# Patient Record
Sex: FEMALE | Race: BLACK OR AFRICAN AMERICAN | NOT HISPANIC OR LATINO | Employment: FULL TIME | URBAN - METROPOLITAN AREA
[De-identification: names, ages, dates, MRNs, and addresses within clinical notes are randomized per-mention and may not be internally consistent; named-entity substitution may affect disease eponyms.]

---

## 2017-01-09 ENCOUNTER — HOSPITAL ENCOUNTER (OUTPATIENT)
Dept: RADIOLOGY | Facility: HOSPITAL | Age: 47
Discharge: HOME | End: 2017-01-09
Attending: OBSTETRICS & GYNECOLOGY
Payer: COMMERCIAL

## 2017-01-09 ENCOUNTER — TELEPHONE (OUTPATIENT)
Dept: INTERNAL MEDICINE | Facility: CLINIC | Age: 47
End: 2017-01-09

## 2017-01-09 DIAGNOSIS — R92.8 ABNORMAL MAMMOGRAM: ICD-10-CM

## 2017-01-09 DIAGNOSIS — Z11.3 SCREEN FOR STD (SEXUALLY TRANSMITTED DISEASE): Primary | ICD-10-CM

## 2017-01-09 PROCEDURE — 77065 DX MAMMO INCL CAD UNI: CPT | Mod: TC,RT

## 2017-01-09 PROCEDURE — 77065 DX MAMMO INCL CAD UNI: CPT | Mod: 26,RT,, | Performed by: RADIOLOGY

## 2017-01-09 NOTE — TELEPHONE ENCOUNTER
HIV, hepatitis and syphilis ordered. HSV 1 and 2 were positive in the past and should not be retested (it will always be positive).
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
## 2017-01-10 ENCOUNTER — DOCUMENTATION ONLY (OUTPATIENT)
Dept: RADIOLOGY | Facility: HOSPITAL | Age: 47
End: 2017-01-10

## 2017-01-10 NOTE — PROGRESS NOTES
Breast Care Management Follow-Up:    Date of Mammogram:01/09/17    Mammogram Reason:Follow-up at short-interval from prior study    Mammogram Results:Stable calcifications in the right breast.      Referrals/Recommendations:Annual mammo in July 2017.        Patient Status:01/10/17 Results letter and report mailed to pt.

## 2017-01-11 ENCOUNTER — TELEPHONE (OUTPATIENT)
Dept: INTERNAL MEDICINE | Facility: CLINIC | Age: 47
End: 2017-01-11

## 2017-01-11 DIAGNOSIS — I10 ESSENTIAL HYPERTENSION: Primary | ICD-10-CM

## 2017-01-12 ENCOUNTER — TELEPHONE (OUTPATIENT)
Dept: INTERNAL MEDICINE | Facility: CLINIC | Age: 47
End: 2017-01-12

## 2017-01-12 DIAGNOSIS — E87.6 DIURETIC-INDUCED HYPOKALEMIA: Primary | ICD-10-CM

## 2017-01-12 DIAGNOSIS — T50.2X5A DIURETIC-INDUCED HYPOKALEMIA: Primary | ICD-10-CM

## 2017-01-12 NOTE — TELEPHONE ENCOUNTER
Spoke with pt and pt states she is currently out of BP medication (HCTZ) but you advised her to change this medication to something different. Can you call in Rx? Please advise.

## 2017-01-13 ENCOUNTER — PATIENT MESSAGE (OUTPATIENT)
Dept: INTERNAL MEDICINE | Facility: CLINIC | Age: 47
End: 2017-01-13

## 2017-01-13 RX ORDER — POTASSIUM CHLORIDE 750 MG/1
10 TABLET, EXTENDED RELEASE ORAL DAILY
Qty: 30 TABLET | Refills: 6 | Status: SHIPPED | OUTPATIENT
Start: 2017-01-13 | End: 2018-01-10 | Stop reason: DRUGHIGH

## 2017-01-13 NOTE — TELEPHONE ENCOUNTER
Please clarify: is patient agreeable to start a new medication in the place of HCTZ? Or is she willing to take potassium with HCTZ.

## 2017-01-16 ENCOUNTER — TELEPHONE (OUTPATIENT)
Dept: INTERNAL MEDICINE | Facility: CLINIC | Age: 47
End: 2017-01-16

## 2017-01-16 DIAGNOSIS — E87.6 HYPOKALEMIA: Primary | ICD-10-CM

## 2017-01-16 NOTE — TELEPHONE ENCOUNTER
Pt was informed prescription from potassium has been sent to the pharmacy per  recommended recheck K+ in one week pt verbalized understanding.

## 2017-01-16 NOTE — TELEPHONE ENCOUNTER
----- Message from Brandie Schumacher LPN sent at 1/16/2017  9:29 AM CST -----  Lab order needed for K+ recheck in one week

## 2017-01-24 ENCOUNTER — LAB VISIT (OUTPATIENT)
Dept: LAB | Facility: HOSPITAL | Age: 47
End: 2017-01-24
Attending: INTERNAL MEDICINE
Payer: COMMERCIAL

## 2017-01-24 DIAGNOSIS — E87.6 HYPOKALEMIA: ICD-10-CM

## 2017-01-24 LAB
ANION GAP SERPL CALC-SCNC: 6 MMOL/L
BUN SERPL-MCNC: 12 MG/DL
CALCIUM SERPL-MCNC: 9.4 MG/DL
CHLORIDE SERPL-SCNC: 104 MMOL/L
CO2 SERPL-SCNC: 28 MMOL/L
CREAT SERPL-MCNC: 0.9 MG/DL
EST. GFR  (AFRICAN AMERICAN): >60 ML/MIN/1.73 M^2
EST. GFR  (NON AFRICAN AMERICAN): >60 ML/MIN/1.73 M^2
GLUCOSE SERPL-MCNC: 83 MG/DL
POTASSIUM SERPL-SCNC: 3.7 MMOL/L
SODIUM SERPL-SCNC: 138 MMOL/L

## 2017-01-24 PROCEDURE — 80048 BASIC METABOLIC PNL TOTAL CA: CPT

## 2017-01-24 PROCEDURE — 36415 COLL VENOUS BLD VENIPUNCTURE: CPT | Mod: PO

## 2017-02-16 ENCOUNTER — TELEPHONE (OUTPATIENT)
Dept: INTERNAL MEDICINE | Facility: CLINIC | Age: 47
End: 2017-02-16

## 2017-02-16 NOTE — TELEPHONE ENCOUNTER
Pt states she had a allergy test and dermatologist  Dr.Tara Slade recommended prescribed epi-pen pt states she will discuss at upcoming visit on 02/21/17.

## 2017-02-16 NOTE — TELEPHONE ENCOUNTER
----- Message from Varsha Onofre sent at 2/16/2017 12:33 PM CST -----  Contact: pt   States she is calling rg having some questions about allergy tests that were done and can be reached at [phone]//thanks/dbw

## 2017-02-21 ENCOUNTER — OFFICE VISIT (OUTPATIENT)
Dept: INTERNAL MEDICINE | Facility: CLINIC | Age: 47
End: 2017-02-21
Payer: COMMERCIAL

## 2017-02-21 ENCOUNTER — HOSPITAL ENCOUNTER (OUTPATIENT)
Dept: RADIOLOGY | Facility: HOSPITAL | Age: 47
Discharge: HOME | End: 2017-02-21
Attending: INTERNAL MEDICINE
Payer: COMMERCIAL

## 2017-02-21 ENCOUNTER — OFFICE VISIT (OUTPATIENT)
Dept: OPHTHALMOLOGY | Facility: CLINIC | Age: 47
End: 2017-02-21
Payer: COMMERCIAL

## 2017-02-21 VITALS
BODY MASS INDEX: 33.76 KG/M2 | HEIGHT: 60 IN | WEIGHT: 171.94 LBS | HEART RATE: 81 BPM | TEMPERATURE: 98 F | SYSTOLIC BLOOD PRESSURE: 120 MMHG | OXYGEN SATURATION: 99 % | DIASTOLIC BLOOD PRESSURE: 86 MMHG

## 2017-02-21 DIAGNOSIS — Z91.010 PEANUT ALLERGY: ICD-10-CM

## 2017-02-21 DIAGNOSIS — H57.12 PAIN AROUND LEFT EYE: ICD-10-CM

## 2017-02-21 DIAGNOSIS — M79.641 RIGHT HAND PAIN: ICD-10-CM

## 2017-02-21 DIAGNOSIS — R20.2 PARESTHESIA OF RIGHT ARM: ICD-10-CM

## 2017-02-21 DIAGNOSIS — H53.8 BLURRED VISION, LEFT EYE: ICD-10-CM

## 2017-02-21 DIAGNOSIS — S69.91XA HAND TRAUMA, RIGHT, INITIAL ENCOUNTER: ICD-10-CM

## 2017-02-21 DIAGNOSIS — Z91.018 NUT ALLERGY: ICD-10-CM

## 2017-02-21 DIAGNOSIS — S05.12XA: Primary | ICD-10-CM

## 2017-02-21 DIAGNOSIS — H52.7 REFRACTIVE ERROR: ICD-10-CM

## 2017-02-21 DIAGNOSIS — Z13.5 GLAUCOMA SCREENING: ICD-10-CM

## 2017-02-21 DIAGNOSIS — S09.90XA HEAD TRAUMA, INITIAL ENCOUNTER: ICD-10-CM

## 2017-02-21 DIAGNOSIS — S09.90XA HEAD TRAUMA, INITIAL ENCOUNTER: Primary | ICD-10-CM

## 2017-02-21 PROCEDURE — 99999 PR PBB SHADOW E&M-EST. PATIENT-LVL II: CPT | Mod: PBBFAC,,, | Performed by: OPTOMETRIST

## 2017-02-21 PROCEDURE — 3074F SYST BP LT 130 MM HG: CPT | Mod: S$GLB,,, | Performed by: INTERNAL MEDICINE

## 2017-02-21 PROCEDURE — 3078F DIAST BP <80 MM HG: CPT | Mod: S$GLB,,, | Performed by: OPTOMETRIST

## 2017-02-21 PROCEDURE — 3074F SYST BP LT 130 MM HG: CPT | Mod: S$GLB,,, | Performed by: OPTOMETRIST

## 2017-02-21 PROCEDURE — 70450 CT HEAD/BRAIN W/O DYE: CPT | Mod: TC,PO

## 2017-02-21 PROCEDURE — 99214 OFFICE O/P EST MOD 30 MIN: CPT | Mod: S$GLB,,, | Performed by: INTERNAL MEDICINE

## 2017-02-21 PROCEDURE — 70450 CT HEAD/BRAIN W/O DYE: CPT | Mod: 26,,, | Performed by: RADIOLOGY

## 2017-02-21 PROCEDURE — 3079F DIAST BP 80-89 MM HG: CPT | Mod: S$GLB,,, | Performed by: INTERNAL MEDICINE

## 2017-02-21 PROCEDURE — 92014 COMPRE OPH EXAM EST PT 1/>: CPT | Mod: S$GLB,,, | Performed by: OPTOMETRIST

## 2017-02-21 PROCEDURE — 99999 PR PBB SHADOW E&M-EST. PATIENT-LVL IV: CPT | Mod: PBBFAC,,, | Performed by: INTERNAL MEDICINE

## 2017-02-21 PROCEDURE — 73130 X-RAY EXAM OF HAND: CPT | Mod: TC,PO,RT

## 2017-02-21 PROCEDURE — 73130 X-RAY EXAM OF HAND: CPT | Mod: 26,RT,, | Performed by: RADIOLOGY

## 2017-02-21 RX ORDER — EPINEPHRINE 0.3 MG/.3ML
INJECTION SUBCUTANEOUS
Qty: 2 EACH | Refills: 6 | Status: SHIPPED | OUTPATIENT
Start: 2017-02-21 | End: 2018-12-21 | Stop reason: SDUPTHER

## 2017-02-21 RX ORDER — IBUPROFEN 800 MG/1
800 TABLET ORAL EVERY 8 HOURS PRN
Qty: 30 TABLET | Refills: 0 | Status: SHIPPED | OUTPATIENT
Start: 2017-02-21 | End: 2017-08-24

## 2017-02-21 NOTE — LETTER
February 21, 2017      Gladys Fernandez DO  [address] [place] [address]           [facility]hthal[facility]  [address] [place] [address]  Phone: [phone]  Fax: [phone]          Patient: Irasema Vang   MR Number: [id number]   YOB: 1970   Date of Visit: 2/21/2017       Dear Dr. Gladys Fernandez:    Thank you for referring Irasema Vang to me for evaluation. Attached you will find relevant portions of my assessment and plan of care.    If you have questions, please do not hesitate to call me. I look forward to following Irasema Vang along with you.    Sincerely,    MARTA Rodriguez, OD    Enclosure  CC:  No Recipients    If you would like to receive this communication electronically, please contact [email or web address] or [phone] to request more information on [employer] access.    For providers and/or their staff who would like to refer a patient to [facility], please contact us through our one-stop-shop provider referral line, Rodrigo Bailey, at [phone].    If you feel you have received this communication in error or would no longer like to receive these types of communications, please e-mail [email or web address]
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
## 2017-02-21 NOTE — MR AVS SNAPSHOT
[facility]  [address]  [place] [address]  Phone: [phone]  Fax: [phone]                  Irasema Vang   2017 8:00 AM   Office Visit    Description:  Female : 1970   Provider:  Gladys Fernandez DO   Department:  [facility] - Internal Medicine           Reason for Visit     Head Injury     Hand Pain           Diagnoses this Visit        Comments    Peanut allergy    -  Primary     Nut allergy         Head trauma, initial encounter         Pain around left eye         Paresthesia of right arm         Right hand pain         Hand trauma, right, initial encounter         Blurred vision, left eye                To Do List           Future Appointments        Provider Department Dept Phone    2017 9:45 AM MARTA Rodriguez OD [facility] - Ophthalmology [phone]    2017 10:10 AM [facility] CT1 LIMIT 500 LBS [facility] [phone]    2017 8:30 AM LABORATORY, [address] [facility] [phone]    2017 3:00 PM Gladys Fernandez DO [facility] Internal Medicine [phone]      Goals (5 Years of Data)     None       These Medications        Disp Refills Start End    epinephrine (EPIPEN) 0.3 mg/0.3 mL AtIn 2 each 6 2017     Inject into the muscle as needed.    Pharmacy: [id number] [place] [address] Ph #: [phone]       ibuprofen (ADVIL,MOTRIN) 800 MG tablet 30 tablet 0 2017     Take 1 tablet (800 mg total) by mouth every 8 (eight) hours as needed for Pain. - Oral    Pharmacy: [address] [address] [facility] Ph #: [phone]         [facility]     [facility] Nurse Care Line -  Assistance  Registered nurses in the [facility] provide clinical advisement, health education, appointment booking, and other advisory services.  Call for this free service at [phone].             Medications            START taking these NEW medications        Refills    epinephrine (EPIPEN) 0.3 mg/0.3 mL AtIn 6    Sig: Inject into the muscle as needed.    Class: Normal      CHANGE how you are taking these medications     Start Taking Instead of    ibuprofen (ADVIL,MOTRIN) 800 MG tablet ibuprofen (ADVIL,MOTRIN) 800 MG tablet    Dosage:  Take 1 tablet (800 mg total) by mouth every 8 (eight) hours as needed for Pain. Dosage:  Take 1 tablet (800 mg total) by mouth every 6 (six) hours as needed for Pain.    Reason for Change:  Reorder            Verify that the below list of medications is an accurate representation of the medications you are currently taking.  If none reported, the list may be blank. If incorrect, please contact your healthcare provider. Carry this list with you in case of emergency.           Current Medications     hydrochlorothiazide (HYDRODIURIL) 25 MG tablet TAKE ONE TABLET BY MOUTH ONCE DAILY    ibuprofen (ADVIL,MOTRIN) 800 MG tablet Take 1 tablet (800 mg total) by mouth every 8 (eight) hours as needed for Pain.    LINZESS 290 mcg Cap TAKE ONE CAPSULE BY MOUTH ONCE DAILY    potassium chloride SA (K-DUR,KLOR-CON) 10 MEQ tablet Take 1 tablet (10 mEq total) by mouth once daily.    epinephrine (EPIPEN) 0.3 mg/0.3 mL AtIn Inject into the muscle as needed.           Clinical Reference Information           Your Vitals Were     BP Pulse Temp Height    120/86 (BP Location: Right arm, Patient Position: Sitting, BP Method: Manual) 81 97.9 °F (36.6 °C) (Tympanic) 5' (1.524 m)    Weight Last Period SpO2 BMI    78 kg (171 lb 15.3 oz) 01/05/2001 99% 33.58 kg/m2      Blood Pressure          Most Recent Value    BP  120/86      Allergies as of 2/21/2017     Shellfish Containing Products    Codeine    Latex, Natural Rubber    Peanut      Immunizations Administered on Date of Encounter - 2/21/2017     None      Orders Placed During Today's Visit      Normal Orders This Visit    Ambulatory Referral to Ophthalmology      Future Labs/Procedures Expected by Expires    CT Head Without Contrast  2/21/2017 2/21/2018    X-Ray Hand 3 View Right  2/21/2017 2/21/2018      Language Assistance Services     ATTENTION: Language assistance services are available, free of charge. Please call [phone].      ATENCIÓN: Si habla sunday, tiene a banuelos disposición servicios gratuitos de asistencia lingüística. Llame al [phone].     CHÚ Ý: N?u b?n nói Ti?ng Vi?t, có các d?ch v? h? tr? ngôn ng? mi?n phí dành cho b?n. G?i s? [phone].         [facility] complies with applicable Federal civil rights laws and does not discriminate on the basis of race, color, national origin, age, disability, or sex.

## 2017-02-21 NOTE — MR AVS SNAPSHOT
[facility] - Ophthalmology  [address] [facility] [address] [facility] [place] [address]  Phone: [phone]  Fax: [phone]                  Irasema Vang   2017 9:45 AM   Office Visit    Description:  Female : 1970   Provider:  MARTA Rodriguez OD   Department:  [facility] - Ophthalmology           Reason for Visit     Eye Problem           Diagnoses this Visit        Comments    Contusion of eye and ocular adnexa, left, initial encounter    -  Primary     Glaucoma screening         Refractive error                To Do List           Future Appointments        Provider Department Dept Phone    2017 8:30 AM LABORATORY, [address] [facility] [phone]    2017 3:00 PM Gladys Fernandez DO [facility] - Internal Medicine [phone]      Goals (5 Years of Data)     None      Follow-Up and Disposition     Return if symptoms worsen or fail to improve.      [facility]     [facility] Nurse Care Line -  Assistance  Registered nurses in the [facility] provide clinical advisement, health education, appointment booking, and other advisory services.  Call for this free service at [phone].             Medications                Verify that the below list of medications is an accurate representation of the medications you are currently taking.  If none reported, the list may be blank. If incorrect, please contact your healthcare provider. Carry this list with you in case of emergency.           Current Medications     hydrochlorothiazide (HYDRODIURIL) 25 MG tablet TAKE ONE TABLET BY MOUTH ONCE DAILY    ibuprofen (ADVIL,MOTRIN) 800 MG tablet Take 1 tablet (800 mg total) by mouth every 8 (eight) hours as needed for Pain.    LINZESS 290 mcg Cap TAKE ONE CAPSULE BY MOUTH ONCE DAILY    potassium chloride SA (K-DUR,KLOR-CON) 10 MEQ tablet Take 1 tablet (10 mEq total) by mouth once daily.    epinephrine (EPIPEN) 0.3 mg/0.3 mL AtIn Inject into the muscle as needed.            Clinical Reference Information           Your Vitals Were     Last Period                   01/05/2001           Allergies as of 2/21/2017     Shellfish Containing Products    Codeine    Latex, Natural Rubber    Peanut      Immunizations Administered on Date of Encounter - 2/21/2017     None      Language Assistance Services     ATTENTION: Language assistance services are available, free of charge. Please call [phone].      ATENCIÓN: Si habla sunday, tiene a banuelos disposición servicios gratuitos de asistencia lingüística. Llame al [phone].     CHÚ Ý: N?u b?n nói Ti?ng Vi?t, có các d?ch v? h? tr? ngôn ng? mi?n phí dành cho b?n. G?i s? [phone].         [facility] - Ophthal[facility] complies with applicable Federal civil rights laws and does not discriminate on the basis of race, color, national origin, age, disability, or sex.

## 2017-02-22 ENCOUNTER — TELEPHONE (OUTPATIENT)
Dept: INTERNAL MEDICINE | Facility: CLINIC | Age: 47
End: 2017-02-22

## 2017-02-22 DIAGNOSIS — F33.1 MODERATE EPISODE OF RECURRENT MAJOR DEPRESSIVE DISORDER: Primary | ICD-10-CM

## 2017-02-22 DIAGNOSIS — F41.9 ANXIETY: ICD-10-CM

## 2017-02-22 RX ORDER — PAROXETINE HYDROCHLORIDE 20 MG/1
20 TABLET, FILM COATED ORAL EVERY MORNING
Qty: 30 TABLET | Refills: 3 | Status: SHIPPED | OUTPATIENT
Start: 2017-02-22 | End: 2017-05-19 | Stop reason: ALTCHOICE

## 2017-02-22 NOTE — TELEPHONE ENCOUNTER
----- Message from Irasema Romeo sent at 2/22/2017  8:20 AM CST -----  Contact: pt  Please call pt @ [phone] regarding a medication for depression, pt have questions.
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
## 2017-03-10 ENCOUNTER — PATIENT MESSAGE (OUTPATIENT)
Dept: INTERNAL MEDICINE | Facility: CLINIC | Age: 47
End: 2017-03-10

## 2017-04-11 ENCOUNTER — PATIENT MESSAGE (OUTPATIENT)
Dept: INTERNAL MEDICINE | Facility: CLINIC | Age: 47
End: 2017-04-11

## 2017-04-12 ENCOUNTER — LAB VISIT (OUTPATIENT)
Dept: LAB | Facility: HOSPITAL | Age: 47
End: 2017-04-12
Attending: INTERNAL MEDICINE
Payer: COMMERCIAL

## 2017-04-12 DIAGNOSIS — I10 HYPERTENSION GOAL BP (BLOOD PRESSURE) < 140/90: ICD-10-CM

## 2017-04-12 LAB
ANION GAP SERPL CALC-SCNC: 6 MMOL/L
BUN SERPL-MCNC: 12 MG/DL
CALCIUM SERPL-MCNC: 9.1 MG/DL
CHLORIDE SERPL-SCNC: 103 MMOL/L
CHOLEST/HDLC SERPL: 4.3 {RATIO}
CO2 SERPL-SCNC: 28 MMOL/L
CREAT SERPL-MCNC: 0.8 MG/DL
EST. GFR  (AFRICAN AMERICAN): >60 ML/MIN/1.73 M^2
EST. GFR  (NON AFRICAN AMERICAN): >60 ML/MIN/1.73 M^2
GLUCOSE SERPL-MCNC: 86 MG/DL
HDL/CHOLESTEROL RATIO: 23.1 %
HDLC SERPL-MCNC: 212 MG/DL
HDLC SERPL-MCNC: 49 MG/DL
LDLC SERPL CALC-MCNC: 138 MG/DL
NONHDLC SERPL-MCNC: 163 MG/DL
POTASSIUM SERPL-SCNC: 3.6 MMOL/L
SODIUM SERPL-SCNC: 137 MMOL/L
TRIGL SERPL-MCNC: 125 MG/DL

## 2017-04-12 PROCEDURE — 80048 BASIC METABOLIC PNL TOTAL CA: CPT

## 2017-04-12 PROCEDURE — 36415 COLL VENOUS BLD VENIPUNCTURE: CPT | Mod: PO

## 2017-04-12 PROCEDURE — 80061 LIPID PANEL: CPT

## 2017-04-13 RX ORDER — MUPIROCIN 20 MG/G
OINTMENT TOPICAL 2 TIMES DAILY PRN
Qty: 30 G | Refills: 0 | Status: SHIPPED | OUTPATIENT
Start: 2017-04-13 | End: 2018-08-21

## 2017-05-19 ENCOUNTER — INITIAL CONSULT (OUTPATIENT)
Dept: PSYCHIATRY | Facility: CLINIC | Age: 47
End: 2017-05-19
Payer: COMMERCIAL

## 2017-05-19 DIAGNOSIS — G47.00 INSOMNIA, UNSPECIFIED TYPE: ICD-10-CM

## 2017-05-19 DIAGNOSIS — F33.1 MODERATE EPISODE OF RECURRENT MAJOR DEPRESSIVE DISORDER: Primary | ICD-10-CM

## 2017-05-19 PROCEDURE — 90792 PSYCH DIAG EVAL W/MED SRVCS: CPT | Mod: S$GLB,,, | Performed by: PSYCHIATRY & NEUROLOGY

## 2017-05-19 RX ORDER — ESCITALOPRAM OXALATE 10 MG/1
10 TABLET ORAL DAILY
Qty: 30 TABLET | Refills: 1 | Status: SHIPPED | OUTPATIENT
Start: 2017-05-19 | End: 2017-07-24 | Stop reason: ALTCHOICE

## 2017-05-19 RX ORDER — TRAZODONE HYDROCHLORIDE 100 MG/1
TABLET ORAL
Qty: 30 TABLET | Refills: 1 | Status: SHIPPED | OUTPATIENT
Start: 2017-05-19 | End: 2017-07-24 | Stop reason: SDUPTHER
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
## 2017-06-21 ENCOUNTER — PATIENT MESSAGE (OUTPATIENT)
Dept: PSYCHIATRY | Facility: CLINIC | Age: 47
End: 2017-06-21

## 2017-07-24 ENCOUNTER — OFFICE VISIT (OUTPATIENT)
Dept: PSYCHIATRY | Facility: CLINIC | Age: 47
End: 2017-07-24
Payer: COMMERCIAL

## 2017-07-24 DIAGNOSIS — G47.00 INSOMNIA, UNSPECIFIED TYPE: ICD-10-CM

## 2017-07-24 DIAGNOSIS — F33.1 MDD (MAJOR DEPRESSIVE DISORDER), RECURRENT EPISODE, MODERATE: Primary | ICD-10-CM

## 2017-07-24 PROCEDURE — 99213 OFFICE O/P EST LOW 20 MIN: CPT | Mod: S$GLB,,, | Performed by: PSYCHIATRY & NEUROLOGY

## 2017-07-24 RX ORDER — TRAZODONE HYDROCHLORIDE 100 MG/1
TABLET ORAL
Qty: 45 TABLET | Refills: 1 | Status: SHIPPED | OUTPATIENT
Start: 2017-07-24 | End: 2017-10-10 | Stop reason: SDUPTHER

## 2017-07-24 RX ORDER — DULOXETIN HYDROCHLORIDE 30 MG/1
CAPSULE, DELAYED RELEASE ORAL
Qty: 60 CAPSULE | Refills: 1 | Status: SHIPPED | OUTPATIENT
Start: 2017-07-24 | End: 2017-10-10 | Stop reason: ALTCHOICE
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
## 2017-08-04 RX ORDER — HYDROCHLOROTHIAZIDE 25 MG/1
TABLET ORAL
Qty: 30 TABLET | Refills: 6 | Status: SHIPPED | OUTPATIENT
Start: 2017-08-04 | End: 2018-03-01 | Stop reason: SDUPTHER

## 2017-08-14 ENCOUNTER — PATIENT MESSAGE (OUTPATIENT)
Dept: INTERNAL MEDICINE | Facility: CLINIC | Age: 47
End: 2017-08-14

## 2017-08-14 ENCOUNTER — PATIENT MESSAGE (OUTPATIENT)
Dept: PSYCHIATRY | Facility: CLINIC | Age: 47
End: 2017-08-14

## 2017-08-24 ENCOUNTER — OFFICE VISIT (OUTPATIENT)
Dept: OBSTETRICS AND GYNECOLOGY | Facility: CLINIC | Age: 47
End: 2017-08-24
Payer: COMMERCIAL

## 2017-08-24 ENCOUNTER — LAB VISIT (OUTPATIENT)
Dept: LAB | Facility: HOSPITAL | Age: 47
End: 2017-08-24
Attending: OBSTETRICS & GYNECOLOGY
Payer: COMMERCIAL

## 2017-08-24 VITALS
BODY MASS INDEX: 35.4 KG/M2 | DIASTOLIC BLOOD PRESSURE: 72 MMHG | SYSTOLIC BLOOD PRESSURE: 124 MMHG | WEIGHT: 180.31 LBS | HEIGHT: 60 IN

## 2017-08-24 DIAGNOSIS — R10.9 ABDOMINAL PAIN, UNSPECIFIED SITE: ICD-10-CM

## 2017-08-24 DIAGNOSIS — Z11.3 SCREEN FOR STD (SEXUALLY TRANSMITTED DISEASE): ICD-10-CM

## 2017-08-24 DIAGNOSIS — Z01.419 WELL WOMAN EXAM WITH ROUTINE GYNECOLOGICAL EXAM: Primary | ICD-10-CM

## 2017-08-24 PROBLEM — F32.A DEPRESSION: Status: ACTIVE | Noted: 2017-08-24

## 2017-08-24 PROBLEM — C64.9 RENAL CELL CARCINOMA: Status: ACTIVE | Noted: 2017-08-24

## 2017-08-24 PROCEDURE — 86803 HEPATITIS C AB TEST: CPT

## 2017-08-24 PROCEDURE — 99999 PR PBB SHADOW E&M-EST. PATIENT-LVL II: CPT | Mod: PBBFAC,,, | Performed by: OBSTETRICS & GYNECOLOGY

## 2017-08-24 PROCEDURE — 36415 COLL VENOUS BLD VENIPUNCTURE: CPT

## 2017-08-24 PROCEDURE — 86703 HIV-1/HIV-2 1 RESULT ANTBDY: CPT

## 2017-08-24 PROCEDURE — 99396 PREV VISIT EST AGE 40-64: CPT | Mod: S$GLB,,, | Performed by: OBSTETRICS & GYNECOLOGY

## 2017-08-24 PROCEDURE — 87340 HEPATITIS B SURFACE AG IA: CPT

## 2017-08-24 PROCEDURE — 86592 SYPHILIS TEST NON-TREP QUAL: CPT

## 2017-08-24 NOTE — PROGRESS NOTES
HPI:  46 y.o. female patient  presents today for annual exam.  She has had a hysterectomy with both ovaries remaining.  She has had lower pelvic and back pain since her hysterectomy (in ).  She has deep dyspareunia as well since her hysterectomy.  Pain is present daily, but intensity varies.  No other complaints.  Pelvic u/s last year was normal.  Urology workup has been negative.      Past Medical History:   Diagnosis Date    Abnormal Pap smear     history of uterine cancer    Abnormal Pap smear of cervix     Asthma     no meds since age 20    Constipation, chronic     Family history of nephrolithiasis     History of cervical dysplasia 2013    Hypertension     Menopausal syndrome (hot flashes) 2013    Nephrolithiasis 2013    Seizures     1 yr ago- caused by migraine meds--no treatment now    Urinoma     Uterine cancer        Past Surgical History:   Procedure Laterality Date    COLONOSCOPY  13    non-bleeding AVMs    COLONOSCOPY W/ POLYPECTOMY      cystoscopy with stent placement      ESOPHAGOGASTRODUODENOSCOPY  13    HYSTERECTOMY      URETEROSCOPY         REVIEW OF SYSTEMS:  GENERAL:  No fever, chills, fatigue, or weight loss  ABDOMEN:  Normal appetite, no weight loss or abdominal pain  URINARY:  No flank pain, dysuria, or hematuria  REPRODUCTIVE:  No abnormal vaginal bleeding  BREASTS:  No tenderness, masses, or nipple discharge noted of breasts    PHYSICAL EXAM:    APPEARANCE:  Well nourished, well developed, in no acute distress  NECK:  Symmetric without masses or thyromegaly  BREASTS:  Symmetrical, no skin changes or visible lesions.  No palpable masses, nipple discharge, or adenopathy bilaterally.  ABDOMEN:  Soft, no tenderness or masses, no distension noted  PELVIC:  VULVA:  No lesions.  Normal female genitalia  URETHRAL MEATUS:  Normal size and location.  No lesions.  No prolapse  URETHRA:  No masses, tenderness, prolapse, or scarring  VAGINA:  No  lesions or discharge.  No significant cystocele or rectocele, tenderness present at apex of vaginal cuff.  UTERUS:  Surgically absent  ADNEXA:  No masses, tenderness is present bilaterally.  ANUS AND PERINEUM:  No lesions.  No external hemorrhoids    1. Well woman exam with routine gynecological exam     2. Screen for STD (sexually transmitted disease)  HIV-1 and HIV-2 antibodies    Hepatitis B surface antigen    RPR    Hepatitis C antibody   3. Abdominal pain, unspecified site           PLAN:  MMG annually.  Patient counseled regarding treatment options for her longstanding pelvic pain.  She would like to proceed with laparoscopy.  Risks, benefits, alternatives were discussed with her.  Will schedule for diagnostic laparoscopy.  Patient counseled regarding recommended routine health maintenance for her age.

## 2017-08-25 ENCOUNTER — TELEPHONE (OUTPATIENT)
Dept: OBSTETRICS AND GYNECOLOGY | Facility: CLINIC | Age: 47
End: 2017-08-25

## 2017-08-25 DIAGNOSIS — R10.9 ABDOMINAL PAIN, UNSPECIFIED LOCATION: Primary | ICD-10-CM

## 2017-08-25 LAB
HBV SURFACE AG SERPL QL IA: NEGATIVE
HCV AB SERPL QL IA: NEGATIVE
HIV 1+2 AB+HIV1 P24 AG SERPL QL IA: NEGATIVE
RPR SER QL: NORMAL

## 2017-08-25 NOTE — TELEPHONE ENCOUNTER
Spoke with patient and she is contacted her employer to get [employer] paperwork to send to [facility] to complete.  I gave her the fax number to send paperwork

## 2017-08-25 NOTE — TELEPHONE ENCOUNTER
----- Message from Cecily Cook sent at 8/25/2017 12:04 PM CDT -----  Contact: Patient  Patient called to speak with the nurse; she needs more information for her FMLA. She can be contacted at [phone].    Thanks,  Cecily

## 2017-08-31 ENCOUNTER — HOSPITAL ENCOUNTER (OUTPATIENT)
Dept: PREADMISSION TESTING | Facility: HOSPITAL | Age: 47
Discharge: HOME | End: 2017-08-31
Attending: EMERGENCY MEDICINE
Payer: COMMERCIAL

## 2017-08-31 ENCOUNTER — OFFICE VISIT (OUTPATIENT)
Dept: OBSTETRICS AND GYNECOLOGY | Facility: CLINIC | Age: 47
End: 2017-08-31
Payer: COMMERCIAL

## 2017-08-31 VITALS
BODY MASS INDEX: 34.97 KG/M2 | WEIGHT: 178.13 LBS | SYSTOLIC BLOOD PRESSURE: 134 MMHG | HEIGHT: 60 IN | DIASTOLIC BLOOD PRESSURE: 88 MMHG

## 2017-08-31 VITALS — WEIGHT: 178 LBS | HEIGHT: 60 IN | BODY MASS INDEX: 34.95 KG/M2

## 2017-08-31 DIAGNOSIS — Z01.818 PREOP EXAMINATION: Primary | ICD-10-CM

## 2017-08-31 PROCEDURE — 99999 PR PBB SHADOW E&M-EST. PATIENT-LVL III: CPT | Mod: PBBFAC,,, | Performed by: OBSTETRICS & GYNECOLOGY

## 2017-08-31 PROCEDURE — 99499 UNLISTED E&M SERVICE: CPT | Mod: S$GLB,,, | Performed by: OBSTETRICS & GYNECOLOGY

## 2017-08-31 NOTE — PROGRESS NOTES
Patient presents today for preoperative visit.  She is scheduled for diagnostic laparoscopy on 9/5/17.  Procedure was explained to the patient and procedure specific consent form reviewed with the patient and signed by her.  All questions were answered to the patient's satisfaction.  Patient seems to understand the procedure, as well as risks and benefits associated with it.  Will plan to proceed with surgery as planned.  Patient agrees with removal of one or both ovaries if indicated at time of laparoscopy.  She desires to retain ovaries if they are normal.

## 2017-08-31 NOTE — DISCHARGE INSTRUCTIONS
To confirm, Your doctor has instructed you that surgery is scheduled for 9/5/17 at  12:00 pm.       Please report to [facility], [address], 1st floor, main lobby by 10:30 Pre admit office will call afternoon prior to surgery for final arrival time.      INSTRUCTIONS IMPORTANT!!!   Do not eat, drink, or smoke after 12 midnight. May have water or clear liquid juice until 3 hrs prior to surgery. OK to brush teeth, no gum, candy or mints!    ¨ Take only these medicines with a small swallow of water-morning of surgery.  None    Pre operative instructions:  Please review the Pre-Operative Instruction booklet that you were given.        Bathing Instructions--See page 6 in the Pre-operative booklet.      Prevention of surgical site infections:     -Keep incisions clean and dry.   -Do not soak/submerge incisions in water until completely healed.   -Do not apply lotions, powders, creams, or deodorants to site.   -Always make sure hands are cleaned with antibacterial soap/ alcohol-based                 prior to touching the surgical site.  (This includes doctors,                 nurses, staff, and yourself.)    Signs and symptoms:   -Redness and pain around the area where you had surgery   -Drainage of cloudy fluid from your surgical wound   -Fever over 100.4       I have read or had read and explained to me, and understand the above information.  Additional comments or instructions:  Received a copy of Pre-operative instructions booklet, FAQ surgical site infection sheet, and packets of hibiclens (if indicated).

## 2017-09-04 ENCOUNTER — ANESTHESIA EVENT (OUTPATIENT)
Dept: SURGERY | Facility: HOSPITAL | Age: 47
End: 2017-09-04
Payer: COMMERCIAL

## 2017-09-05 ENCOUNTER — ANESTHESIA (OUTPATIENT)
Dept: SURGERY | Facility: HOSPITAL | Age: 47
End: 2017-09-05
Payer: COMMERCIAL

## 2017-09-05 ENCOUNTER — HOSPITAL ENCOUNTER (OUTPATIENT)
Facility: HOSPITAL | Age: 47
Discharge: HOME | End: 2017-09-05
Attending: OBSTETRICS & GYNECOLOGY | Admitting: OBSTETRICS & GYNECOLOGY
Payer: COMMERCIAL

## 2017-09-05 DIAGNOSIS — G89.29 CHRONIC PELVIC PAIN IN FEMALE: Primary | ICD-10-CM

## 2017-09-05 DIAGNOSIS — R10.2 PELVIC PAIN IN FEMALE: ICD-10-CM

## 2017-09-05 DIAGNOSIS — R10.2 CHRONIC PELVIC PAIN IN FEMALE: Primary | ICD-10-CM

## 2017-09-05 DIAGNOSIS — N94.10 DYSPAREUNIA, FEMALE: ICD-10-CM

## 2017-09-05 PROCEDURE — 25000242 PHARM REV CODE 250 ALT 637 W/ HCPCS: Performed by: ANESTHESIOLOGY

## 2017-09-05 PROCEDURE — 37000009 HC ANESTHESIA EA ADD 15 MINS: Performed by: OBSTETRICS & GYNECOLOGY

## 2017-09-05 PROCEDURE — 63600175 PHARM REV CODE 636 W HCPCS: Performed by: ANESTHESIOLOGY

## 2017-09-05 PROCEDURE — 63600175 PHARM REV CODE 636 W HCPCS: Performed by: OBSTETRICS & GYNECOLOGY

## 2017-09-05 PROCEDURE — 71000015 HC POSTOP RECOV 1ST HR: Performed by: OBSTETRICS & GYNECOLOGY

## 2017-09-05 PROCEDURE — 71000039 HC RECOVERY, EACH ADD'L HOUR: Performed by: OBSTETRICS & GYNECOLOGY

## 2017-09-05 PROCEDURE — 88305 TISSUE EXAM BY PATHOLOGIST: CPT | Mod: 26,,, | Performed by: PATHOLOGY

## 2017-09-05 PROCEDURE — 58661 LAPAROSCOPY REMOVE ADNEXA: CPT | Mod: 50,,, | Performed by: OBSTETRICS & GYNECOLOGY

## 2017-09-05 PROCEDURE — 25000003 PHARM REV CODE 250: Performed by: OBSTETRICS & GYNECOLOGY

## 2017-09-05 PROCEDURE — 37000008 HC ANESTHESIA 1ST 15 MINUTES: Performed by: OBSTETRICS & GYNECOLOGY

## 2017-09-05 PROCEDURE — 71000033 HC RECOVERY, INTIAL HOUR: Performed by: OBSTETRICS & GYNECOLOGY

## 2017-09-05 PROCEDURE — 27201423 OPTIME MED/SURG SUP & DEVICES STERILE SUPPLY: Performed by: OBSTETRICS & GYNECOLOGY

## 2017-09-05 PROCEDURE — 36000709 HC OR TIME LEV III EA ADD 15 MIN: Performed by: OBSTETRICS & GYNECOLOGY

## 2017-09-05 PROCEDURE — 63600175 PHARM REV CODE 636 W HCPCS: Performed by: NURSE ANESTHETIST, CERTIFIED REGISTERED

## 2017-09-05 PROCEDURE — C1765 ADHESION BARRIER: HCPCS | Performed by: OBSTETRICS & GYNECOLOGY

## 2017-09-05 PROCEDURE — 25000003 PHARM REV CODE 250: Performed by: NURSE ANESTHETIST, CERTIFIED REGISTERED

## 2017-09-05 PROCEDURE — 36000708 HC OR TIME LEV III 1ST 15 MIN: Performed by: OBSTETRICS & GYNECOLOGY

## 2017-09-05 PROCEDURE — 88305 TISSUE EXAM BY PATHOLOGIST: CPT | Performed by: PATHOLOGY

## 2017-09-05 DEVICE — BARRIER INTERCEED 3X4 ST DIS: Type: IMPLANTABLE DEVICE | Site: ABDOMEN | Status: FUNCTIONAL

## 2017-09-05 RX ORDER — IBUPROFEN 800 MG/1
800 TABLET ORAL EVERY 6 HOURS PRN
Qty: 30 TABLET | Refills: 1 | Status: SHIPPED | OUTPATIENT
Start: 2017-09-05

## 2017-09-05 RX ORDER — KETOROLAC TROMETHAMINE 30 MG/ML
INJECTION, SOLUTION INTRAMUSCULAR; INTRAVENOUS
Status: DISCONTINUED | OUTPATIENT
Start: 2017-09-05 | End: 2017-09-05

## 2017-09-05 RX ORDER — LIDOCAINE HCL/PF 100 MG/5ML
SYRINGE (ML) INTRAVENOUS
Status: DISCONTINUED | OUTPATIENT
Start: 2017-09-05 | End: 2017-09-05

## 2017-09-05 RX ORDER — NEOSTIGMINE METHYLSULFATE 1 MG/ML
INJECTION, SOLUTION INTRAVENOUS
Status: DISCONTINUED | OUTPATIENT
Start: 2017-09-05 | End: 2017-09-05

## 2017-09-05 RX ORDER — LIDOCAINE HYDROCHLORIDE 10 MG/ML
1 INJECTION, SOLUTION EPIDURAL; INFILTRATION; INTRACAUDAL; PERINEURAL ONCE
Status: DISCONTINUED | OUTPATIENT
Start: 2017-09-05 | End: 2017-09-05 | Stop reason: HOSPADM

## 2017-09-05 RX ORDER — OXYCODONE AND ACETAMINOPHEN 5; 325 MG/1; MG/1
1 TABLET ORAL EVERY 4 HOURS PRN
Qty: 20 TABLET | Refills: 0 | Status: SHIPPED | OUTPATIENT
Start: 2017-09-05 | End: 2018-02-19

## 2017-09-05 RX ORDER — PHENAZOPYRIDINE HYDROCHLORIDE 100 MG/1
200 TABLET, FILM COATED ORAL
Status: DISCONTINUED | OUTPATIENT
Start: 2017-09-05 | End: 2017-09-05 | Stop reason: HOSPADM

## 2017-09-05 RX ORDER — SUCCINYLCHOLINE CHLORIDE 20 MG/ML
INJECTION INTRAMUSCULAR; INTRAVENOUS
Status: DISCONTINUED | OUTPATIENT
Start: 2017-09-05 | End: 2017-09-05

## 2017-09-05 RX ORDER — ROCURONIUM BROMIDE 10 MG/ML
INJECTION, SOLUTION INTRAVENOUS
Status: DISCONTINUED | OUTPATIENT
Start: 2017-09-05 | End: 2017-09-05

## 2017-09-05 RX ORDER — PROPOFOL 10 MG/ML
VIAL (ML) INTRAVENOUS
Status: DISCONTINUED | OUTPATIENT
Start: 2017-09-05 | End: 2017-09-05

## 2017-09-05 RX ORDER — MIDAZOLAM HYDROCHLORIDE 1 MG/ML
INJECTION, SOLUTION INTRAMUSCULAR; INTRAVENOUS
Status: DISCONTINUED | OUTPATIENT
Start: 2017-09-05 | End: 2017-09-05

## 2017-09-05 RX ORDER — ACETAMINOPHEN 10 MG/ML
1000 INJECTION, SOLUTION INTRAVENOUS
Status: COMPLETED | OUTPATIENT
Start: 2017-09-05 | End: 2017-09-05

## 2017-09-05 RX ORDER — SODIUM CHLORIDE, SODIUM LACTATE, POTASSIUM CHLORIDE, CALCIUM CHLORIDE 600; 310; 30; 20 MG/100ML; MG/100ML; MG/100ML; MG/100ML
INJECTION, SOLUTION INTRAVENOUS CONTINUOUS
Status: DISCONTINUED | OUTPATIENT
Start: 2017-09-05 | End: 2017-09-05 | Stop reason: HOSPADM

## 2017-09-05 RX ORDER — GLYCOPYRROLATE 0.2 MG/ML
INJECTION INTRAMUSCULAR; INTRAVENOUS
Status: DISCONTINUED | OUTPATIENT
Start: 2017-09-05 | End: 2017-09-05

## 2017-09-05 RX ORDER — FENTANYL CITRATE 50 UG/ML
25 INJECTION, SOLUTION INTRAMUSCULAR; INTRAVENOUS EVERY 5 MIN PRN
Status: DISCONTINUED | OUTPATIENT
Start: 2017-09-05 | End: 2017-09-05 | Stop reason: HOSPADM

## 2017-09-05 RX ORDER — FENTANYL CITRATE 50 UG/ML
INJECTION, SOLUTION INTRAMUSCULAR; INTRAVENOUS
Status: DISCONTINUED | OUTPATIENT
Start: 2017-09-05 | End: 2017-09-05

## 2017-09-05 RX ORDER — MEPERIDINE HYDROCHLORIDE 50 MG/ML
12.5 INJECTION INTRAMUSCULAR; INTRAVENOUS; SUBCUTANEOUS ONCE AS NEEDED
Status: DISCONTINUED | OUTPATIENT
Start: 2017-09-05 | End: 2017-09-05 | Stop reason: HOSPADM

## 2017-09-05 RX ORDER — SODIUM CHLORIDE 0.9 % (FLUSH) 0.9 %
3 SYRINGE (ML) INJECTION
Status: DISCONTINUED | OUTPATIENT
Start: 2017-09-05 | End: 2017-09-05 | Stop reason: HOSPADM

## 2017-09-05 RX ORDER — HYDROMORPHONE HYDROCHLORIDE 2 MG/ML
0.2 INJECTION, SOLUTION INTRAMUSCULAR; INTRAVENOUS; SUBCUTANEOUS EVERY 5 MIN PRN
Status: DISCONTINUED | OUTPATIENT
Start: 2017-09-05 | End: 2017-09-05 | Stop reason: HOSPADM

## 2017-09-05 RX ORDER — IPRATROPIUM BROMIDE AND ALBUTEROL SULFATE 2.5; .5 MG/3ML; MG/3ML
3 SOLUTION RESPIRATORY (INHALATION) ONCE
Status: COMPLETED | OUTPATIENT
Start: 2017-09-05 | End: 2017-09-05

## 2017-09-05 RX ORDER — ONDANSETRON 2 MG/ML
INJECTION INTRAMUSCULAR; INTRAVENOUS
Status: DISCONTINUED | OUTPATIENT
Start: 2017-09-05 | End: 2017-09-05

## 2017-09-05 RX ADMIN — HYDROMORPHONE HYDROCHLORIDE 0.2 MG: 2 INJECTION, SOLUTION INTRAMUSCULAR; INTRAVENOUS; SUBCUTANEOUS at 03:09

## 2017-09-05 RX ADMIN — SUCCINYLCHOLINE CHLORIDE 140 MG: 20 INJECTION, SOLUTION INTRAMUSCULAR; INTRAVENOUS at 01:09

## 2017-09-05 RX ADMIN — ROBINUL 0.4 MG: 0.2 INJECTION INTRAMUSCULAR; INTRAVENOUS at 02:09

## 2017-09-05 RX ADMIN — NEOSTIGMINE METHYLSULFATE 3 MG: 1 INJECTION INTRAVENOUS at 02:09

## 2017-09-05 RX ADMIN — ONDANSETRON 4 MG: 2 INJECTION, SOLUTION INTRAMUSCULAR; INTRAVENOUS at 02:09

## 2017-09-05 RX ADMIN — FENTANYL CITRATE 100 MCG: 50 INJECTION, SOLUTION INTRAMUSCULAR; INTRAVENOUS at 01:09

## 2017-09-05 RX ADMIN — MIDAZOLAM HYDROCHLORIDE 2 MG: 1 INJECTION, SOLUTION INTRAMUSCULAR; INTRAVENOUS at 01:09

## 2017-09-05 RX ADMIN — ACETAMINOPHEN 1000 MG: 10 INJECTION, SOLUTION INTRAVENOUS at 01:09

## 2017-09-05 RX ADMIN — FENTANYL CITRATE 50 MCG: 50 INJECTION, SOLUTION INTRAMUSCULAR; INTRAVENOUS at 02:09

## 2017-09-05 RX ADMIN — FENTANYL CITRATE 50 MCG: 50 INJECTION, SOLUTION INTRAMUSCULAR; INTRAVENOUS at 01:09

## 2017-09-05 RX ADMIN — ROCURONIUM BROMIDE 5 MG: 10 INJECTION, SOLUTION INTRAVENOUS at 01:09

## 2017-09-05 RX ADMIN — SODIUM CHLORIDE, SODIUM LACTATE, POTASSIUM CHLORIDE, AND CALCIUM CHLORIDE: 600; 310; 30; 20 INJECTION, SOLUTION INTRAVENOUS at 01:09

## 2017-09-05 RX ADMIN — ROCURONIUM BROMIDE 15 MG: 10 INJECTION, SOLUTION INTRAVENOUS at 01:09

## 2017-09-05 RX ADMIN — KETOROLAC TROMETHAMINE 30 MG: 30 INJECTION, SOLUTION INTRAMUSCULAR; INTRAVENOUS at 02:09

## 2017-09-05 RX ADMIN — SODIUM CHLORIDE, SODIUM LACTATE, POTASSIUM CHLORIDE, AND CALCIUM CHLORIDE: 600; 310; 30; 20 INJECTION, SOLUTION INTRAVENOUS at 02:09

## 2017-09-05 RX ADMIN — IPRATROPIUM BROMIDE AND ALBUTEROL SULFATE 3 ML: .5; 3 SOLUTION RESPIRATORY (INHALATION) at 02:09

## 2017-09-05 RX ADMIN — PROPOFOL 200 MG: 10 INJECTION, EMULSION INTRAVENOUS at 01:09

## 2017-09-05 RX ADMIN — LIDOCAINE HYDROCHLORIDE 75 MG: 20 INJECTION, SOLUTION INTRAVENOUS at 01:09

## 2017-09-05 NOTE — PLAN OF CARE
Pt resting on stretcher, no s&s of pain/distress. Abd sites with edges approximated. VSS. See flow sheet for detailed assessment. Will cont to monitor.

## 2017-09-05 NOTE — OP NOTE
OPERATIVE NOTE      DATE OF PROCEDURE:  9/5/2017    PREOPERATIVE DIAGNOSIS:    1.  Chronic Pelvic Pain  2.  Dyspareunia  3.  Prior hysterectomy    POSTOPERATIVE DIAGNOSIS    1.  Chronic Pelvic Pain  2.  Dyspareunia  3.  Prior hysterectomy  4.  Adhesions of right ovary and tube to vaginal cuff and adhesions of left tube to vaginal cuff.    OPERATIVE PROCEDURE:    1.  Laparoscopic right salpingo-oophorectomy  2.  Left salpingectomy  3.  Lysis of adhesions    SURGEON:  Darby Monroy MD    ASSISTANT:  Patrica Rudd CST    ANESTHESIA:  General    ESTIMATED BLOOD LOSS:  75 ml    FINDINGS:  Adhesions of right ovary and tube to vaginal cuff and right pelvic sidewall.  Adhesions of left tube to vaginal cuff.  Bowel adhesions to vaginal cuff.  Normal left ovary    COMPLICATIONS:  None    PROCEDURE IN DETAIL:   The procedure was explained to the patient and informed consent was obtained. The patient was brought to the operating room where general anesthesia was administered. A garcia catheter was placed, as well as a Zumi uterine manipulator, and she was prepped and draped in the usual sterile manner. A time out was performed. While tenting up on the abdominal skin with towel clips, a Veress needle was placed through the umbilicus into the peritoneal cavity. A saline drop test was noted to be normal. The abdomen was insufflated with carbon dioxide to reach a pressure of 15 mm of Hg. A small intraumbilical stab incision was then made with the scalpel and a 5 mm trocar and sleeve were placed without difficulty. Laparoscopic confirmation of location was achieved. The upper abdomen was visualized and appeared normal. The pelvis was visualized with the findings noted above. A right 5 mm trocar as well as a suprapubic 11 mm trocar were also placed under direct visualization.      Adhesions of bowel to the vaginal cuff were removed using the harmonic scalpel.  The left fallopian tube was noted to be adhered to the vaginal cuff with  adhesions.  The tube was removed using the harmonic scalpel.  The right ovary and tube were placed under tension with a grasper.  The harmonic scalpel was used to transect the infundibulopelvic ligament.  The harmonic scalpel was then used to completely free the ovary and tube from adhesions to the pelvic sidewall and to the vaginal cuff.  An endocatch bag was then used to remove the ovary and tubes through the suprapubic incision.  These were sent to pathology.  The pelvis was well irrigated with good hemostasis noted.  No other pelvic abnormalities were noted.  The appendix appeared normal.     At this time the procedure was concluded, as no other abnormalities were noted in the pelvis. All ports were removed under direct visualization and carbon dioxide was allowed to escape from the abdomen.  The fascia of the suprapubic incision was closed with a 0-Vicryl interrupted suture.  The skin incisions were closed with 4-0 vicryl in a subcuticular fashion. Dermoplast skin adhesive was placed over the incisions. The patient tolerated the procedure well and was taken to the recovery room in stable condition. All counts were correct x 3.

## 2017-09-05 NOTE — ANESTHESIA PREPROCEDURE EVALUATION
09/05/2017  Irasema Vang is a 46 y.o., female.    Anesthesia Evaluation    I have reviewed the Patient Summary Reports.    I have reviewed the Nursing Notes.   I have reviewed the Medications.     Review of Systems  Anesthesia Hx:  Denies Family Hx of Anesthesia complications.   Denies Personal Hx of Anesthesia complications.   Social:  Non-Smoker    Hematology/Oncology:  Hematology Normal   Oncology Normal     Cardiovascular:   Hypertension    Pulmonary:   Asthma asymptomatic    Renal/:   renal calculi    Hepatic/GI:  Hepatic/GI Normal    Neurological:   Seizures    Endocrine:  Endocrine Normal    Psych:   depression          Physical Exam   Airway/Jaw/Neck:  Airway Findings: Mouth Opening: Normal Tongue: Normal  General Airway Assessment: Adult  Mallampati: II  Jaw/Neck Findings:  Neck ROM: Normal ROM       Chest/Lungs:  Chest/Lungs Findings: Clear to auscultation, Normal Respiratory Rate     Heart/Vascular:  Heart Findings: Rate: Normal  Rhythm: Regular Rhythm             Anesthesia Plan  Type of Anesthesia, risks & benefits discussed:  Anesthesia Type:  general  Patient's Preference:   Intra-op Monitoring Plan:   Intra-op Monitoring Plan Comments:   Post Op Pain Control Plan:   Post Op Pain Control Plan Comments:   Induction:   IV  Beta Blocker:  Patient is not currently on a Beta-Blocker (No further documentation required).       Informed Consent: Patient understands risks and agrees with Anesthesia plan.  Questions answered. Anesthesia consent signed with patient.  ASA Score: 2     Day of Surgery Review of History & Physical:    H&P update referred to the surgeon.         Ready For Surgery From Anesthesia Perspective.

## 2017-09-05 NOTE — HPI
46 y.o. female patient  presents for diagnostic laparoscopy.  She has had a hysterectomy with both ovaries remaining.  She has had lower pelvic and back pain since her hysterectomy (in ).  She has deep dyspareunia as well since her hysterectomy.  Pain is present daily, but intensity varies.  No other complaints.  Pelvic u/s last year was normal.  Urology workup has been negative

## 2017-09-05 NOTE — H&P
[facility] -   Obstetrics & Gynecology  History & Physical    Patient Name: Irasema Vang  MRN: [id number]  Admission Date: (Not on file)  Primary Care Provider: Gladys Fernandez DO    Subjective:     Chief Complaint/Reason for Admission:  Diagnostic laparoscopy for chronic pelvic pain and dyspareunia    History of Present Illness:  46 y.o. female patient  presents for diagnostic laparoscopy.  She has had a hysterectomy with both ovaries remaining.  She has had lower pelvic and back pain since her hysterectomy (in ).  She has deep dyspareunia as well since her hysterectomy.  Pain is present daily, but intensity varies.  No other complaints.  Pelvic u/s last year was normal.  Urology workup has been negative        Obstetric History       T0      L0     SAB0   TAB0   Ectopic0   Multiple0   Live Births0       # Outcome Date GA Lbr Mauricio/2nd Weight Sex Delivery Anes PTL Lv   3 SAB            2 SAB            1 SAB                 Past Medical History:   Diagnosis Date    Abnormal Pap smear     history of uterine cancer    Abnormal Pap smear of cervix     Asthma     no meds since age 20    Constipation, chronic     Depression     Family history of nephrolithiasis     History of cervical dysplasia 2013    Hypertension     Menopausal syndrome (hot flashes) 2013    Nephrolithiasis 2013    Seizures     1 yr ago- caused by migraine meds--no treatment now    Urinoma     Uterine cancer      Past Surgical History:   Procedure Laterality Date    COLONOSCOPY  13    non-bleeding AVMs    COLONOSCOPY W/ POLYPECTOMY      cystoscopy with stent placement      ESOPHAGOGASTRODUODENOSCOPY  13    HYSTERECTOMY      URETEROSCOPY         No prescriptions prior to admission.       Review of patient's allergies indicates:   Allergen Reactions    Shellfish containing products     Codeine Itching    Latex, natural rubber Hives    Peanut         Family  History     Problem Relation (Age of Onset)    Breast cancer Maternal Aunt, Maternal Aunt    Cancer Maternal Grandmother, Maternal Aunt, Father    Diabetes Brother    Hypertension Maternal Aunt, Mother        Social History Main Topics    Smoking status: Never Smoker    Smokeless tobacco: Never Used    Alcohol use No    Drug use: No    Sexual activity: Not Currently     Partners: Male     Review of Systems   Constitutional: Negative for chills and fever.   Respiratory: Negative for cough and shortness of breath.    Cardiovascular: Negative for chest pain.   Gastrointestinal: Negative for abdominal pain, nausea and vomiting.   Genitourinary: Positive for dyspareunia and pelvic pain. Negative for dysuria and vaginal bleeding.      Objective:     Vital Signs (Most Recent):    Vital Signs (24h Range):           There is no height or weight on file to calculate BMI.    Patient's last menstrual period was 01/05/2001.    Physical Exam:   Constitutional: She is oriented to person, place, and time. She appears well-developed and well-nourished. No distress.    HENT:   Head: Normocephalic and atraumatic.     Neck: Neck supple.    Cardiovascular: Normal rate and regular rhythm.     Pulmonary/Chest: Effort normal.        Abdominal: Soft. She exhibits no distension. There is no tenderness.             Musculoskeletal: She exhibits no edema or tenderness.       Neurological: She is alert and oriented to person, place, and time.    Skin: Skin is warm and dry.    Psychiatric: She has a normal mood and affect.       Laboratory:  I have personallly reviewed all pertinent lab results from the last 24 hours.        Assessment/Plan:     * Chronic pelvic pain in female    Proceed with diagnostic laparoscopy, as planned.  Plan to retain ovaries unless an abnormality is noted.            Darby Monroy MD  Obstetrics & Gynecology  [facility]

## 2017-09-05 NOTE — ASSESSMENT & PLAN NOTE
Proceed with diagnostic laparoscopy, as planned.  Plan to retain ovaries unless an abnormality is noted.

## 2017-09-05 NOTE — TRANSFER OF CARE
Anesthesia Transfer of Care Note    Patient: Irasema Vang    Procedure(s) Performed: Procedure(s) (LRB):  LAPAROSCOPY-DIAGNOSTIC (N/A)  SALPINGECTOMY-LAPAROSCOPIC (Bilateral)  OOPHORECTOMY-LAPAROSCOPIC (Right)    Patient location: PACU    Anesthesia Type: general    Transport from OR: Transported from OR on room air with adequate spontaneous ventilation    Post pain: adequate analgesia    Post assessment: no apparent anesthetic complications and tolerated procedure well    Post vital signs: stable    Level of consciousness: awake    Nausea/Vomiting: no nausea/vomiting    Complications: none    Transfer of care protocol was followed      Last vitals:   Visit Vitals  /76 (BP Location: Right arm, Patient Position: Sitting)   Pulse (!) 53   Temp 36.5 °C (97.7 °F) (Tympanic)   Resp 18   Ht 5' (1.524 m)   Wt 79.5 kg (175 lb 4.3 oz)   LMP 01/05/2001   SpO2 100%   Breastfeeding? No   BMI 34.23 kg/m²

## 2017-09-05 NOTE — SUBJECTIVE & OBJECTIVE
Obstetric History       T0      L0     SAB0   TAB0   Ectopic0   Multiple0   Live Births0       # Outcome Date GA Lbr Mauricio/2nd Weight Sex Delivery Anes PTL Lv   3 SAB            2 SAB            1 SAB                 Past Medical History:   Diagnosis Date    Abnormal Pap smear     history of uterine cancer    Abnormal Pap smear of cervix     Asthma     no meds since age 20    Constipation, chronic     Depression     Family history of nephrolithiasis     History of cervical dysplasia 2013    Hypertension     Menopausal syndrome (hot flashes) 2013    Nephrolithiasis 2013    Seizures     1 yr ago- caused by migraine meds--no treatment now    Urinoma     Uterine cancer      Past Surgical History:   Procedure Laterality Date    COLONOSCOPY  13    non-bleeding AVMs    COLONOSCOPY W/ POLYPECTOMY      cystoscopy with stent placement      ESOPHAGOGASTRODUODENOSCOPY  13    HYSTERECTOMY      URETEROSCOPY         No prescriptions prior to admission.       Review of patient's allergies indicates:   Allergen Reactions    Shellfish containing products     Codeine Itching    Latex, natural rubber Hives    Peanut         Family History     Problem Relation (Age of Onset)    Breast cancer Maternal Aunt, Maternal Aunt    Cancer Maternal Grandmother, Maternal Aunt, Father    Diabetes Brother    Hypertension Maternal Aunt, Mother        Social History Main Topics    Smoking status: Never Smoker    Smokeless tobacco: Never Used    Alcohol use No    Drug use: No    Sexual activity: Not Currently     Partners: Male     Review of Systems   Constitutional: Negative for chills and fever.   Respiratory: Negative for cough and shortness of breath.    Cardiovascular: Negative for chest pain.   Gastrointestinal: Negative for abdominal pain, nausea and vomiting.   Genitourinary: Positive for dyspareunia and pelvic pain. Negative for dysuria and vaginal bleeding.       Objective:     Vital Signs (Most Recent):    Vital Signs (24h Range):           There is no height or weight on file to calculate BMI.    Patient's last menstrual period was 01/05/2001.    Physical Exam:   Constitutional: She is oriented to person, place, and time. She appears well-developed and well-nourished. No distress.    HENT:   Head: Normocephalic and atraumatic.     Neck: Neck supple.    Cardiovascular: Normal rate and regular rhythm.     Pulmonary/Chest: Effort normal.        Abdominal: Soft. She exhibits no distension. There is no tenderness.             Musculoskeletal: She exhibits no edema or tenderness.       Neurological: She is alert and oriented to person, place, and time.    Skin: Skin is warm and dry.    Psychiatric: She has a normal mood and affect.       Laboratory:  I have personallly reviewed all pertinent lab results from the last 24 hours.

## 2017-09-05 NOTE — DISCHARGE INSTRUCTIONS
General Information:    1.  Do not drink alcoholic beverages including beer for 24 hours or as long as you are on pain medication..  2.  Do not drive a motor vehicle, operate machinery or power tools, or signs legal papers for 24 hours or as long as you are on pain medication.   3.  You may experience light-headedness, dizziness, and sleepiness following surgery. Please do not stay alone. A responsible adult should be with you for this 24 hour period.  4.  Go home and rest.    5. Progress slowly to a normal diet unless instructed.  Otherwise, begin with liquids such as soft drinks, then soup and crackers working up to solid foods. Drink plenty of nonalcoholic fluids.  6.  Certain anesthetics and pain medications produce nausea and vomiting in certain       individuals. If nausea becomes a problem at home, call you doctor.    7. Several times every hour while you are awake, take 2-3 deep breaths and cough. If you had stomach surgery hold a pillow or rolled towel firmly against your stomach before you cough. This will help with any pain the cough might cause.    8. Several times every hour while you are awake, pump and flex your feet 5-6 times and do foot circles. This will help prevent blood clots.    9.Call your doctor for severe pain, bleeding, fever, or signs or symptoms of infection (pain, swelling, redness, foul odor, drainage).    10.You can contact your doctor anytime by [phone] for the [facility] (at [facility]) or [phone] for the [facility] on [facility].   [email or web address] is another way to contact your doctor if you are an active participant online with [facility].

## 2017-09-06 ENCOUNTER — TELEPHONE (OUTPATIENT)
Dept: OBSTETRICS AND GYNECOLOGY | Facility: CLINIC | Age: 47
End: 2017-09-06

## 2017-09-06 RX ORDER — LINACLOTIDE 290 UG/1
CAPSULE, GELATIN COATED ORAL
Qty: 30 CAPSULE | Refills: 11 | Status: SHIPPED | OUTPATIENT
Start: 2017-09-06 | End: 2018-09-18 | Stop reason: ALTCHOICE

## 2017-09-06 NOTE — TELEPHONE ENCOUNTER
"----- Message from Taricorine Garcia sent at 9/6/2017  8:47 AM CDT -----  Contact: Patient  Patient states she was to "out of it" after her surgery, and needs to talk to nurse about what took place, please call her back at [phone]. Thank you  "

## 2017-09-12 VITALS
BODY MASS INDEX: 34.41 KG/M2 | OXYGEN SATURATION: 97 % | SYSTOLIC BLOOD PRESSURE: 142 MMHG | DIASTOLIC BLOOD PRESSURE: 79 MMHG | HEIGHT: 60 IN | RESPIRATION RATE: 14 BRPM | WEIGHT: 175.25 LBS | HEART RATE: 68 BPM | TEMPERATURE: 98 F

## 2017-09-12 NOTE — DISCHARGE SUMMARY
[facility] -   Obstetrics & Gynecology  Discharge Summary    Patient Name: Irasema Vang  MRN: [id number]  Admission Date: 2017  Hospital Length of Stay: 0 days  Discharge Date and Time: 2017  4:43 PM  Attending Physician: No att. providers found   Discharging Provider: Darby Monroy MD  Primary Care Provider: Gladys Fernandez DO    HPI:  46 y.o. female patient  presents for diagnostic laparoscopy.  She has had a hysterectomy with both ovaries remaining.  She has had lower pelvic and back pain since her hysterectomy (in ).  She has deep dyspareunia as well since her hysterectomy.  Pain is present daily, but intensity varies.  No other complaints.  Pelvic u/s last year was normal.  Urology workup has been negative    Hospital Course:  No notes on file    Procedure(s) (LRB):  LAPAROSCOPY-DIAGNOSTIC (N/A)  SALPINGECTOMY-LAPAROSCOPIC (Left)  [id number] (Right)  [id number]         Significant Diagnostic Studies: Labs: All labs within the past 24 hours have been reviewed    Pending Diagnostic Studies:     None        Final Active Diagnoses:    Diagnosis Date Noted POA    PRINCIPAL PROBLEM:  Pelvic pain in female [R10.2] 2017 Yes    Chronic pelvic pain in female [R10.2, G89.29] 2017 Yes    Dyspareunia, female [N94.10] 2017 Yes      Problems Resolved During this Admission:    Diagnosis Date Noted Date Resolved POA        Discharged Condition: stable    Disposition: Home or Self Care    Follow Up:  Follow-up Information     Darby Monroy MD In 4 weeks.    Specialties:  Obstetrics, Obstetrics and Gynecology  Why:  Postop check  Contact information:  [address] [place] [address] [address] Aura [address]OCHO [address] [phone]                 Patient Instructions:     Diet general     Activity as tolerated     Other restrictions (specify):   Order Comments: Pelvic rest x 2 weeks     Call MD for:  temperature >100.4     Call MD for:   persistent nausea and vomiting or diarrhea     Call MD for:  severe uncontrolled pain     Call MD for:  redness, tenderness, or signs of infection (pain, swelling, redness, odor or green/yellow discharge around incision site)     Call MD for:  difficulty breathing or increased cough     Call MD for:  persistent dizziness, light-headedness, or visual disturbances       Medications:  Reconciled Home Medications:   Discharge Medication List as of 9/5/2017  3:31 PM      START taking these medications    Details   ibuprofen (ADVIL,MOTRIN) 800 MG tablet Take 1 tablet (800 mg total) by mouth every 6 (six) hours as needed for Pain., Starting Tue 9/5/2017, Normal      oxycodone-acetaminophen (PERCOCET) 5-325 mg per tablet Take 1 tablet by mouth every 4 (four) hours as needed for Pain., Starting Tue 9/5/2017, Normal         CONTINUE these medications which have NOT CHANGED    Details   duloxetine (CYMBALTA) 30 MG capsule Take 1 capsule daily for 7 days then 2 capsules daily, Normal      hydrochlorothiazide (HYDRODIURIL) 25 MG tablet TAKE ONE TABLET BY MOUTH ONCE DAILY, Normal      potassium chloride SA (K-DUR,KLOR-CON) 10 MEQ tablet Take 1 tablet (10 mEq total) by mouth once daily., Starting 1/13/2017, Until Discontinued, Normal      trazodone (DESYREL) 100 MG tablet Take 1/2 to 1 and 1/2 tablets at bedtime as needed for sleep., Normal      LINZESS 290 mcg Cap TAKE ONE CAPSULE BY MOUTH ONCE DAILY, Normal      epinephrine (EPIPEN) 0.3 mg/0.3 mL AtIn Inject into the muscle as needed., Normal      mupirocin (BACTROBAN) 2 % ointment Apply topically 2 (two) times daily as needed., Starting 4/13/2017, Until Discontinued, Normal             Darby Monroy MD  Obstetrics & Gynecology  [facility]

## 2017-09-18 ENCOUNTER — PATIENT MESSAGE (OUTPATIENT)
Dept: OBSTETRICS AND GYNECOLOGY | Facility: CLINIC | Age: 47
End: 2017-09-18

## 2017-09-20 ENCOUNTER — TELEPHONE (OUTPATIENT)
Dept: OBSTETRICS AND GYNECOLOGY | Facility: CLINIC | Age: 47
End: 2017-09-20

## 2017-09-20 NOTE — TELEPHONE ENCOUNTER
----- Message from Nelsy Rubio sent at 9/20/2017  8:24 AM CDT -----  Pt is requesting a call from nurse to f/u on her return to and paper to release back to work.          Please call pt back at [phone]

## 2017-10-06 ENCOUNTER — OFFICE VISIT (OUTPATIENT)
Dept: OBSTETRICS AND GYNECOLOGY | Facility: CLINIC | Age: 47
End: 2017-10-06
Payer: COMMERCIAL

## 2017-10-06 VITALS
BODY MASS INDEX: 35.53 KG/M2 | HEIGHT: 60 IN | SYSTOLIC BLOOD PRESSURE: 122 MMHG | DIASTOLIC BLOOD PRESSURE: 80 MMHG | WEIGHT: 181 LBS

## 2017-10-06 DIAGNOSIS — Z98.890 POST-OPERATIVE STATE: Primary | ICD-10-CM

## 2017-10-06 PROCEDURE — 99999 PR PBB SHADOW E&M-EST. PATIENT-LVL III: CPT | Mod: PBBFAC,,, | Performed by: OBSTETRICS & GYNECOLOGY

## 2017-10-06 PROCEDURE — 99024 POSTOP FOLLOW-UP VISIT: CPT | Mod: S$GLB,,, | Performed by: OBSTETRICS & GYNECOLOGY

## 2017-10-06 NOTE — LETTER
October 6, 2017                     [facility]  [address] [facility] [address]  Phone: [phone]  Fax: [phone]   Patient: Irasema Vang   MR Number: [id number]   YOB: 1970   Date of Visit: 10/6/2017     To whom it may concern :     Please excuse patient from today she was seen in clinic today 10/6/17 and is able to return to work on Monday 10/9/2017. Feel free to call office with any questions or concerns at .         Sincerely,      Dr Darby Monroy MD              CC  No Recipients    Enclosure

## 2017-10-07 NOTE — PROGRESS NOTES
Patient presents today for postoperative follow up.  Pt. underwent laparoscopic RSO, left salpingectomy, and lysis of adhesions on 9/5/17.  Patient is recovering well and has no complaints today.    Pathology report reviewed.    Physical Exam:  General - no acute distress  Abdomen - soft, nontender and nondistended.  No masses.  Incisions are well healed with no evidence of infection.  Extremities - nontender and no edema noted.    Encounter Diagnoses   Name Primary?    Post-operative state Yes       PLAN:  Follow up as needed.

## 2017-10-10 ENCOUNTER — OFFICE VISIT (OUTPATIENT)
Dept: PSYCHIATRY | Facility: CLINIC | Age: 47
End: 2017-10-10
Payer: COMMERCIAL

## 2017-10-10 DIAGNOSIS — F33.2 SEVERE EPISODE OF RECURRENT MAJOR DEPRESSIVE DISORDER, WITHOUT PSYCHOTIC FEATURES: Primary | ICD-10-CM

## 2017-10-10 PROCEDURE — 99213 OFFICE O/P EST LOW 20 MIN: CPT | Mod: S$GLB,,, | Performed by: PSYCHIATRY & NEUROLOGY

## 2017-10-10 RX ORDER — BUPROPION HYDROCHLORIDE 150 MG/1
TABLET ORAL
Qty: 60 TABLET | Refills: 1 | Status: SHIPPED | OUTPATIENT
Start: 2017-10-10 | End: 2017-12-11 | Stop reason: DRUGHIGH

## 2017-10-10 RX ORDER — TRAZODONE HYDROCHLORIDE 100 MG/1
TABLET ORAL
Qty: 60 TABLET | Refills: 1 | Status: SHIPPED | OUTPATIENT
Start: 2017-10-10 | End: 2017-12-11 | Stop reason: SDUPTHER
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
## 2017-10-11 PROBLEM — F32.9 MAJOR DEPRESSIVE DISORDER WITHOUT PSYCHOTIC FEATURES: Status: ACTIVE | Noted: 2017-08-24

## 2017-10-13 ENCOUNTER — PATIENT MESSAGE (OUTPATIENT)
Dept: PSYCHIATRY | Facility: CLINIC | Age: 47
End: 2017-10-13

## 2017-10-13 NOTE — TELEPHONE ENCOUNTER
Spoke with Patient needs second part to her [place] paperwork filled out. Informed that DR. VERA is in clinic until 4pm and will do admin work after clinic. Shazia can Fax paperwork over on Tues or before if complete. States understanding.

## 2017-10-13 NOTE — TELEPHONE ENCOUNTER
----- Message from Anjali Randolph sent at 10/12/2017  3:34 PM CDT -----  Contact: Patient   Patient request a call back at [phone], Regards to her [facility] paperwork.    Thanks  td

## 2017-10-24 ENCOUNTER — PATIENT MESSAGE (OUTPATIENT)
Dept: OBSTETRICS AND GYNECOLOGY | Facility: CLINIC | Age: 47
End: 2017-10-24

## 2017-10-24 NOTE — TELEPHONE ENCOUNTER
See patient portal message.  I would recommend that she come in for a follow up wound check.  I can see her on Thursday at 3:45 if that works for her.

## 2017-12-11 ENCOUNTER — OFFICE VISIT (OUTPATIENT)
Dept: PSYCHIATRY | Facility: CLINIC | Age: 47
End: 2017-12-11
Payer: COMMERCIAL

## 2017-12-11 DIAGNOSIS — F33.1 MODERATE EPISODE OF RECURRENT MAJOR DEPRESSIVE DISORDER: Primary | ICD-10-CM

## 2017-12-11 PROCEDURE — 99213 OFFICE O/P EST LOW 20 MIN: CPT | Mod: S$GLB,,, | Performed by: PSYCHIATRY & NEUROLOGY

## 2017-12-11 RX ORDER — BUPROPION HYDROCHLORIDE 300 MG/1
300 TABLET ORAL DAILY
Qty: 30 TABLET | Refills: 1 | Status: SHIPPED | OUTPATIENT
Start: 2017-12-11 | End: 2018-02-09 | Stop reason: SDUPTHER

## 2017-12-11 RX ORDER — TRAZODONE HYDROCHLORIDE 100 MG/1
TABLET ORAL
Qty: 60 TABLET | Refills: 1 | Status: SHIPPED | OUTPATIENT
Start: 2017-12-11 | End: 2018-02-09 | Stop reason: SINTOL
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
## 2018-01-10 ENCOUNTER — TELEPHONE (OUTPATIENT)
Dept: INTERNAL MEDICINE | Facility: CLINIC | Age: 48
End: 2018-01-10

## 2018-01-10 ENCOUNTER — HOSPITAL ENCOUNTER (EMERGENCY)
Facility: HOSPITAL | Age: 48
Discharge: HOME | End: 2018-01-10
Attending: EMERGENCY MEDICINE
Payer: COMMERCIAL

## 2018-01-10 VITALS
WEIGHT: 173.75 LBS | HEIGHT: 60 IN | SYSTOLIC BLOOD PRESSURE: 147 MMHG | DIASTOLIC BLOOD PRESSURE: 105 MMHG | TEMPERATURE: 97 F | OXYGEN SATURATION: 100 % | BODY MASS INDEX: 34.11 KG/M2 | HEART RATE: 66 BPM | RESPIRATION RATE: 18 BRPM

## 2018-01-10 DIAGNOSIS — R07.9 CHEST PAIN: ICD-10-CM

## 2018-01-10 DIAGNOSIS — I16.0 HYPERTENSIVE URGENCY: Primary | ICD-10-CM

## 2018-01-10 DIAGNOSIS — E87.6 HYPOKALEMIA: ICD-10-CM

## 2018-01-10 LAB
ALBUMIN SERPL BCP-MCNC: 3.8 G/DL
ALP SERPL-CCNC: 60 U/L
ALT SERPL W/O P-5'-P-CCNC: 33 U/L
ANION GAP SERPL CALC-SCNC: 11 MMOL/L
AST SERPL-CCNC: 32 U/L
BASOPHILS # BLD AUTO: 0.01 K/UL
BASOPHILS NFR BLD: 0.2 %
BILIRUB SERPL-MCNC: 0.7 MG/DL
BUN SERPL-MCNC: 10 MG/DL
CALCIUM SERPL-MCNC: 9.4 MG/DL
CHLORIDE SERPL-SCNC: 99 MMOL/L
CO2 SERPL-SCNC: 27 MMOL/L
CREAT SERPL-MCNC: 0.8 MG/DL
DIFFERENTIAL METHOD: ABNORMAL
EOSINOPHIL # BLD AUTO: 0.2 K/UL
EOSINOPHIL NFR BLD: 3.7 %
ERYTHROCYTE [DISTWIDTH] IN BLOOD BY AUTOMATED COUNT: 13 %
EST. GFR  (AFRICAN AMERICAN): >60 ML/MIN/1.73 M^2
EST. GFR  (NON AFRICAN AMERICAN): >60 ML/MIN/1.73 M^2
GLUCOSE SERPL-MCNC: 86 MG/DL
HCT VFR BLD AUTO: 37.6 %
HGB BLD-MCNC: 13 G/DL
LYMPHOCYTES # BLD AUTO: 1.8 K/UL
LYMPHOCYTES NFR BLD: 44.4 %
MCH RBC QN AUTO: 30.4 PG
MCHC RBC AUTO-ENTMCNC: 34.6 G/DL
MCV RBC AUTO: 88 FL
MONOCYTES # BLD AUTO: 0.3 K/UL
MONOCYTES NFR BLD: 7.3 %
NEUTROPHILS # BLD AUTO: 1.8 K/UL
NEUTROPHILS NFR BLD: 44.4 %
PLATELET # BLD AUTO: 228 K/UL
PMV BLD AUTO: 9.1 FL
POTASSIUM SERPL-SCNC: 2.7 MMOL/L
PROT SERPL-MCNC: 8.6 G/DL
RBC # BLD AUTO: 4.27 M/UL
SODIUM SERPL-SCNC: 137 MMOL/L
WBC # BLD AUTO: 4.1 K/UL

## 2018-01-10 PROCEDURE — 85025 COMPLETE CBC W/AUTO DIFF WBC: CPT

## 2018-01-10 PROCEDURE — 93010 ELECTROCARDIOGRAM REPORT: CPT | Mod: ,,, | Performed by: INTERNAL MEDICINE

## 2018-01-10 PROCEDURE — 25000003 PHARM REV CODE 250: Performed by: EMERGENCY MEDICINE

## 2018-01-10 PROCEDURE — 99284 EMERGENCY DEPT VISIT MOD MDM: CPT | Mod: 25

## 2018-01-10 PROCEDURE — 80053 COMPREHEN METABOLIC PANEL: CPT

## 2018-01-10 PROCEDURE — 93005 ELECTROCARDIOGRAM TRACING: CPT

## 2018-01-10 RX ORDER — QUINAPRIL 20 MG/1
20 TABLET ORAL NIGHTLY
Qty: 30 TABLET | Refills: 1 | Status: SHIPPED | OUTPATIENT
Start: 2018-01-10 | End: 2018-03-02

## 2018-01-10 RX ORDER — POTASSIUM CHLORIDE 20 MEQ/1
20 TABLET, EXTENDED RELEASE ORAL 2 TIMES DAILY
Qty: 60 TABLET | Refills: 0 | Status: SHIPPED | OUTPATIENT
Start: 2018-01-10 | End: 2018-01-25 | Stop reason: SDUPTHER

## 2018-01-10 RX ORDER — CLONIDINE HYDROCHLORIDE 0.1 MG/1
0.1 TABLET ORAL
Status: COMPLETED | OUTPATIENT
Start: 2018-01-10 | End: 2018-01-10

## 2018-01-10 RX ORDER — IBUPROFEN 800 MG/1
800 TABLET ORAL
Status: COMPLETED | OUTPATIENT
Start: 2018-01-10 | End: 2018-01-10

## 2018-01-10 RX ORDER — POTASSIUM CHLORIDE 20 MEQ/1
40 TABLET, EXTENDED RELEASE ORAL
Status: COMPLETED | OUTPATIENT
Start: 2018-01-10 | End: 2018-01-10

## 2018-01-10 RX ADMIN — IBUPROFEN 800 MG: 800 TABLET, FILM COATED ORAL at 01:01

## 2018-01-10 RX ADMIN — CLONIDINE HYDROCHLORIDE 0.1 MG: 0.1 TABLET ORAL at 01:01

## 2018-01-10 RX ADMIN — POTASSIUM CHLORIDE 40 MEQ: 1500 TABLET, EXTENDED RELEASE ORAL at 02:01

## 2018-01-10 NOTE — TELEPHONE ENCOUNTER
----- Message from Jarvis Ortiz sent at 1/10/2018  8:39 AM CST -----  Contact: pt   Pt called and requested a callback in regards to blood pressure being high today 132/109 need advice on what to do..[phone]

## 2018-01-10 NOTE — ED NOTES
Bed: Dispo 1  Expected date:   Expected time:   Means of arrival:   Comments:  OSCAR Linton RN  01/10/18 122

## 2018-01-10 NOTE — ED PROVIDER NOTES
SCRIBE #1 NOTE: I, Tony Muñoz, am scribing for, and in the presence of, Kim Grier Do, MD. I have scribed the entire note.      History      Chief Complaint   Patient presents with    Hypertension     with mild chest pressure, blurred vision, dizziness       Review of patient's allergies indicates:   Allergen Reactions    Shellfish containing products     Codeine Itching    Latex, natural rubber Hives    Peanut         HPI   HPI    1/10/2018, 12:36 PM   History obtained from the patient      History of Present Illness: Irasema Vang is a 47 y.o. female patient who presents to the Emergency Department for HTN which onset gradually last week. Sxs are constant and moderate in severity. Pt reports elevated BP for the last week. Pt takes HCTZ as Rx.There are no mitigating or exacerbating factors noted. Associated sxs include episodic blurred vision and dizziness.  Pt denies any fever, N/V/D, LOC, SOB, numbness, CP, ABD pain, arm pain, leg swelling, and all other sxs at this time. No further complaints or concerns at this time.       Arrival mode: Personal vehicle      PCP: Gladys Fernandez DO       Past Medical History:  Past Medical History:   Diagnosis Date    Abnormal Pap smear     history of uterine cancer    Abnormal Pap smear of cervix     Asthma     no meds since age 20    Constipation, chronic     Depression     Family history of nephrolithiasis     History of cervical dysplasia 6/27/2013    Hypertension     Menopausal syndrome (hot flashes) 6/27/2013    Nephrolithiasis 6/27/2013    Seizures     1 yr ago- caused by migraine meds--no treatment now    Urinoma     Uterine cancer 2000       Past Surgical History:  Past Surgical History:   Procedure Laterality Date    COLONOSCOPY  05/17/13    non-bleeding AVMs    COLONOSCOPY W/ POLYPECTOMY      cystoscopy with stent placement      ESOPHAGOGASTRODUODENOSCOPY  05/17/13    HYSTERECTOMY      Laparoscopy  09/05/2017    URETEROSCOPY            Family History:  Family History   Problem Relation Age of Onset    Cancer Maternal Grandmother     Diabetes Brother     Cancer Maternal Aunt     Breast cancer Maternal Aunt     Hypertension Maternal Aunt     Breast cancer Maternal Aunt     Hypertension Mother     Cancer Father     Colon cancer Neg Hx     Ovarian cancer Neg Hx        Social History:  Social History     Social History Main Topics    Smoking status: Never Smoker    Smokeless tobacco: Never Used    Alcohol use No    Drug use: No    Sexual activity: Not Currently     Partners: Male       ROS   Review of Systems   Constitutional: Negative for fever.        (+) HTN   HENT: Negative for sore throat.    Eyes:        (+) blurred vision   Respiratory: Negative for shortness of breath.    Cardiovascular: Negative for chest pain.   Gastrointestinal: Negative for nausea.   Genitourinary: Negative for dysuria.   Musculoskeletal: Negative for back pain.   Skin: Negative for rash.   Neurological: Positive for dizziness. Negative for weakness.   Hematological: Does not bruise/bleed easily.     Physical Exam      Initial Vitals [01/10/18 1138]   BP Pulse Resp Temp SpO2   (!) 179/111 83 18 97.4 °F (36.3 °C) 98 %      MAP       133.67          Physical Exam  Nursing Notes and Vital Signs Reviewed.  Constitutional: Patient is in no acute distress. Well-developed and well-nourished.  Head: Atraumatic. Normocephalic.  Eyes: PERRL. EOM intact. Conjunctivae are not pale. No scleral icterus.  ENT: Mucous membranes are moist. Oropharynx is clear and symmetric.    Neck: Supple. Full ROM. No lymphadenopathy.  Cardiovascular: Regular rate. Regular rhythm. No murmurs, rubs, or gallops. Distal pulses are 2+ and symmetric.  Pulmonary/Chest: No respiratory distress. Clear to auscultation bilaterally. No wheezing or rales.  Abdominal: Soft and non-distended.  There is no tenderness.  No rebound, guarding, or rigidity. Good bowel sounds.  Genitourinary: No CVA  tenderness  Musculoskeletal: Moves all extremities. No obvious deformities. No edema. No calf tenderness.  Skin: Warm and dry.  Neurological:  Alert, awake, and appropriate.  Normal speech.  No acute focal neurological deficits are appreciated.  Psychiatric: Normal affect. Good eye contact. Appropriate in content.    ED Course    Procedures  ED Vital Signs:  Vitals:    01/10/18 1138 01/10/18 1342   BP: (!) 179/111 (!) 147/105   Pulse: 83 66   Resp: 18 18   Temp: 97.4 °F (36.3 °C)    SpO2: 98% 100%   Weight: 78.8 kg (173 lb 11.6 oz)    Height: 5' (1.524 m)        Abnormal Lab Results:  Labs Reviewed   CBC W/ AUTO DIFFERENTIAL - Abnormal; Notable for the following:        Result Value    MPV 9.1 (*)     All other components within normal limits   COMPREHENSIVE METABOLIC PANEL - Abnormal; Notable for the following:     Potassium 2.7 (*)     Total Protein 8.6 (*)     All other components within normal limits    Narrative:      Potassium critical result(s) called and verbal readback obtained   from Lynette El RN at 1343, 01/10/2018 13:43        All Lab Results:  Results for orders placed or performed during the hospital encounter of 01/10/18   CBC auto differential   Result Value Ref Range    WBC 4.10 3.90 - 12.70 K/uL    RBC 4.27 4.00 - 5.40 M/uL    Hemoglobin 13.0 12.0 - 16.0 g/dL    Hematocrit 37.6 37.0 - 48.5 %    MCV 88 82 - 98 fL    MCH 30.4 27.0 - 31.0 pg    MCHC 34.6 32.0 - 36.0 g/dL    RDW 13.0 11.5 - 14.5 %    Platelets 228 150 - 350 K/uL    MPV 9.1 (L) 9.2 - 12.9 fL    Gran # 1.8 1.8 - 7.7 K/uL    Lymph # 1.8 1.0 - 4.8 K/uL    Mono # 0.3 0.3 - 1.0 K/uL    Eos # 0.2 0.0 - 0.5 K/uL    Baso # 0.01 0.00 - 0.20 K/uL    Gran% 44.4 38.0 - 73.0 %    Lymph% 44.4 18.0 - 48.0 %    Mono% 7.3 4.0 - 15.0 %    Eosinophil% 3.7 0.0 - 8.0 %    Basophil% 0.2 0.0 - 1.9 %    Differential Method Automated    Comprehensive metabolic panel   Result Value Ref Range    Sodium 137 136 - 145 mmol/L    Potassium 2.7 (LL) 3.5 - 5.1  mmol/L    Chloride 99 95 - 110 mmol/L    CO2 27 23 - 29 mmol/L    Glucose 86 70 - 110 mg/dL    BUN, Bld 10 6 - 20 mg/dL    Creatinine 0.8 0.5 - 1.4 mg/dL    Calcium 9.4 8.7 - 10.5 mg/dL    Total Protein 8.6 (H) 6.0 - 8.4 g/dL    Albumin 3.8 3.5 - 5.2 g/dL    Total Bilirubin 0.7 0.1 - 1.0 mg/dL    Alkaline Phosphatase 60 55 - 135 U/L    AST 32 10 - 40 U/L    ALT 33 10 - 44 U/L    Anion Gap 11 8 - 16 mmol/L    eGFR if African American >60 >60 mL/min/1.73 m^2    eGFR if non African American >60 >60 mL/min/1.73 m^2         Imaging Results:  Imaging Results          X-Ray Chest PA And Lateral (Final result)  Result time 01/10/18 13:06:52    Final result by Kenyon Goff MD (01/10/18 13:06:52)                 Impression:         Negative two-view chest x-ray.      Electronically signed by: KENYON GOFF MD  Date:     01/10/18  Time:    13:06              Narrative:    XR CHEST PA AND LATERAL, 01/10/18 13:00:20    Clinical indication: Chest Pain     Findings:   Heart size is normal. The lung fields are clear. No acute pulmonary infiltrate.                                      The EKG was ordered, reviewed, and independently interpreted by the ED provider.  Interpretation time: 11:44  Rate: 69 BPM  Rhythm: normal sinus rhythm  Interpretation: Low voltage QRS. No STEMI.    The Emergency Provider reviewed the vital signs and test results, which are outlined above.    ED Discussion     2:09 PM: Reassessed pt. Pt states their condition has improved at this time.  Discussed with pt all pertinent ED information and results. Discussed plan of treatment with pt. Gave pt all f/u and return to the ED instructions. All questions and concerns were addressed at this time. Pt understands and agrees to plan as discussed. Pt is stable for discharge.   Will take pt of hydrochlorothiazide and switch to Accupril  She will also be prescribed potassium.  Will get her BMP rechecked in 5-7 days.      Pre-hypertension/Hypertension: The  pt has been informed that they may have pre-hypertension or hypertension based on a blood pressure reading in the ED. I recommend that the pt call the PCP listed on their discharge instructions or a physician of their choice this week to arrange f/u for further evaluation of possible pre-hypertension or hypertension.     ED Medication(s):  Medications   potassium chloride SA CR tablet 40 mEq (not administered)   cloNIDine tablet 0.1 mg (0.1 mg Oral Given 1/10/18 1306)   ibuprofen tablet 800 mg (800 mg Oral Given 1/10/18 1306)       New Prescriptions    POTASSIUM CHLORIDE SA (K-DUR,KLOR-CON) 20 MEQ TABLET    Take 1 tablet (20 mEq total) by mouth 2 (two) times daily.    QUINAPRIL (ACCUPRIL) 20 MG TABLET    Take 1 tablet (20 mg total) by mouth every evening.       Follow-up Information     Gladys Fernandez DO In 2 days.    Specialty:  Internal Medicine  Contact information:  [address] [place] [address] [phone]             Manohar Villarreal MD In 2 days.    Specialty:  Nephrology  Contact information:  [address]  [place] [address] [phone]                     Medical Decision Making    Medical Decision Making:   Clinical Tests:   Lab Tests: Reviewed and Ordered  Radiological Study: Reviewed and Ordered  Medical Tests: Reviewed and Ordered           Scribe Attestation:   Scribe #1: I performed the above scribed service and the documentation accurately describes the services I performed. I attest to the accuracy of the note.    Attending:   Physician Attestation Statement for Scribe #1: I, Kim Grier Do, MD, personally performed the services described in this documentation, as scribed by Tony Muñoz, in my presence, and it is both accurate and complete.          Clinical Impression       ICD-10-CM ICD-9-CM   1. Hypertensive urgency I16.0 401.9   2. Chest pain R07.9 786.50   3. Hypokalemia E87.6 276.8       Disposition:   Disposition: Discharged  Condition: Stable          Kim Grier Do, MD  01/10/18 7746

## 2018-01-10 NOTE — TELEPHONE ENCOUNTER
Called and spoke with pt. Pt stated that her bp is high and she wanted to know what to do. She also stated she should have called sooner, but she forgot. I stated are you having any symptoms of chest pain, headache, SOB, and dizziness. Pt stated she is having all the symptoms except for the chest pain and her

## 2018-01-11 NOTE — TELEPHONE ENCOUNTER
Called and spoke with pt. Pt stated she could not remember what she should stop taking for bp medication. I stated you would have to call the ER since you was last seen by them. She also stated she needed to do an ER f/u I  Stated there is an appt tomorrow. Scheduled pt per request on 1/12/18 at 3:40pm.

## 2018-01-11 NOTE — TELEPHONE ENCOUNTER
----- Message from Kay Powell LPN sent at 1/10/2018 12:57 PM CST -----  Pt wanted to let you know her bp is 179/124 she is at the hospital right now. No call back is necessary

## 2018-01-11 NOTE — TELEPHONE ENCOUNTER
----- Message from Anjali Randolph sent at 1/11/2018  1:08 PM CST -----  Contact: Patient   Patient needs to know which medication to stop taken for her blood pressure. Please call her at [phone].    Thanks  td

## 2018-01-12 ENCOUNTER — OFFICE VISIT (OUTPATIENT)
Dept: INTERNAL MEDICINE | Facility: CLINIC | Age: 48
End: 2018-01-12
Payer: COMMERCIAL

## 2018-01-12 VITALS
DIASTOLIC BLOOD PRESSURE: 90 MMHG | HEIGHT: 60 IN | HEART RATE: 81 BPM | OXYGEN SATURATION: 97 % | TEMPERATURE: 97 F | BODY MASS INDEX: 34.49 KG/M2 | SYSTOLIC BLOOD PRESSURE: 124 MMHG | WEIGHT: 175.69 LBS

## 2018-01-12 DIAGNOSIS — I10 HYPERTENSION GOAL BP (BLOOD PRESSURE) < 140/90: Primary | ICD-10-CM

## 2018-01-12 PROCEDURE — 99213 OFFICE O/P EST LOW 20 MIN: CPT | Mod: S$GLB,,, | Performed by: INTERNAL MEDICINE

## 2018-01-12 PROCEDURE — 99999 PR PBB SHADOW E&M-EST. PATIENT-LVL III: CPT | Mod: PBBFAC,,, | Performed by: INTERNAL MEDICINE

## 2018-01-12 NOTE — PATIENT INSTRUCTIONS
Uncontrolled High Blood Pressure (Established)    Your blood pressure was unusually high today. This can occur if youve missed doses of your blood pressure medicine. Or it can happen if you are taking other medicines. These include some asthma inhalers, decongestants, diet pills, and street drugs like cocaine and amphetamine.  Other causes include:  · Weight gain  · More salt in your diet  · Smoking  · Caffeine  Your blood pressure can also rise if you are emotionally upset or in intense pain. It may go back to normal after a period of rest.  A blood pressure reading is made up of 2 numbers. There is a top number over a bottom number. The top number is the systolic pressure. The bottom number is the diastolic pressure. A normal blood pressure is a systolic pressure of less than 120 over a diastolic pressure of less than 80. High blood pressure (hypertension) is when the top number is 140 or higher. Or it is when the bottom number is 90 or higher. You will see your blood pressure readings written together. For example, a person with a systolic pressure of 118 and a diastolic pressure of 78 will have 118/78 written in the medical record. To be high blood pressure, the numbers must be higher when tested over a period of time. The blood pressures between normal and hypertension are called prehypertension. Prehypertension is a warning sign. The information gives you a chance to make lifestyle changes (weight loss, more exercise) that can keep your blood pressure from going higher.  Home care  Its important to take steps to lower your blood pressure. If you are taking blood pressure medicine, the guidelines below may help you need less or no medicines in the future.  · Begin a weight-loss program if you are overweight.  · Cut back on the amount of salt in your diet:  ¨ Avoid high-salt foods like olives, pickles, smoked meats, and salted potato chips.  ¨ Dont add salt to your food at the table.  ¨ Use only small  amounts of salt when cooking.  · Begin an exercise program. Talk with your health care provider about what exercise program is best for you. It doesnt have to be difficult. Even brisk walking for 20 minutes 3 times a week is a good form of exercise.  · Avoid medicines that stimulates the heart. This includes many over-the-counter cold and sinus decongestant pills and sprays, as well as diet pills. Check the warnings about hypertension on the label. Before purchasing any over-the-counter medicines or supplements, always ask the pharmacist about the product's potential interaction with your high blood pressure and your medicines.  · Stimulants such as amphetamine or cocaine could be lethal for someone with hypertension. Never take these.  · Limit how much caffeine you drink. Or switch to noncaffeinated beverages.  · Stop smoking. If you are a long-time smoker, this can be hard. Enroll in a stop-smoking program to make it more likely that you will succeed. Talk with your provider about ways to quit.  · Learn how to handle stress better. This is an important part of any program to lower blood pressure. Learn ways to relax. These include meditation, yoga, and biofeedback.  · If medicines were prescribed, take them exactly as directed. Missing doses may cause your blood pressure to get out of control.  · If you miss a dose or doses of your medicines, check with your healthcare provider or pharmacist about what to do.  · Consider buying an automatic blood pressure machine. Your provider may recommend a certain type. You can get one of these at most pharmacies. Measure your blood pressure twice a day, in the morning, and in the late afternoon. Keep a written record of your home blood pressure readings and take the record to your medical appointments.  Here are some additional guidelines on home blood pressure monitoring from the American [employer] Association.  · Don't smoke or drink coffee for 30 minutes  · Go to the bathroom  before the test.  · Relax for 5 minutes before taking the measurement.  · Sit correctly. Be sure your back is supported. Don't sit on a couch or soft chair. Uncross your feet and place them flat on the floor. Place your arm on a solid, flat surface like a table with the upper arm at heart level. Make certain the middle of the cuff is directly above the eye of the elbow. Check the monitor's instruction manual for an illustration.  · Take multiple readings. When you measure, take 2 or 3 readings one minute apart and record all of the results.  · Take your blood pressure at the same time every day, or as your healthcare provider recommends.  · Record the date, time, and blood pressure reading.  · Take the record with you to your next appointment. If your blood pressure monitor has a built-in memory, simply take the monitor with you to your next appointment.  · Call your provider if you have several high readings. Don't be frightened by a single high reading, but if you get several high readings, check in with your healthcare provider.  · Note: When blood pressure reaches a systolic (top number) of 180 or higher or a diastolic (bottom number) of 110 or higher, emergency medical treatment is required. Call your healthcare provider immediately.  Follow-up care  Regular visits to your own healthcare provider for blood pressure and medicine checks are an important part of your care. Make a follow-up appointment as directed. Bring the record of your home blood pressure readings to the appointment.  When to seek medical advice  Call your healthcare provider right away if any of these occur:  · Blood pressure reaches a systolic (top number) of 180 or higher or diastolic (bottom number) of 110 or higher, emergency medical treatment is required.  · Chest, arm, shoulder, neck, or upper back pain  · Shortness of breath  · Severe headache  · Throbbing or rushing sound in the ears  · Nosebleed  · Extreme drowsiness, confusion, or  fainting  · Dizziness or dizziness with spinning sensation (vertigo)  · Weakness in an arm or leg or on one side of the face  · Trouble speaking or seeing   Date Last Reviewed: 1/1/2017  © 1544-0828 [employer]. [address] [place] [address], [place] [address]. All rights reserved. This information is not intended as a substitute for professional medical care. Always follow your healthcare professional's instructions.

## 2018-01-15 NOTE — PROGRESS NOTES
Irasema Vang  47 y.o. Black or  female    Chief Complaint   Patient presents with    Follow-up     HTN--ER visit 1/10     HPI: Presents to the clinic for an ER visit follow up for HTN. Her b/p remains elevated. She was prescribed quinapril to take in addition to HCTZ that she was already taking. She has not started the medication yet.      Past Medical History:   Diagnosis Date    Abnormal Pap smear     history of uterine cancer    Abnormal Pap smear of cervix     Asthma     no meds since age 20    Constipation, chronic     Depression     Family history of nephrolithiasis     History of cervical dysplasia 6/27/2013    Hypertension     Menopausal syndrome (hot flashes) 6/27/2013    Nephrolithiasis 6/27/2013    Seizures     1 yr ago- caused by migraine meds--no treatment now    Urinoma     Uterine cancer 2000         Current Outpatient Prescriptions:     buPROPion (WELLBUTRIN XL) 300 MG 24 hr tablet, Take 1 tablet (300 mg total) by mouth once daily., Disp: 30 tablet, Rfl: 1    epinephrine (EPIPEN) 0.3 mg/0.3 mL AtIn, Inject into the muscle as needed., Disp: 2 each, Rfl: 6    hydrochlorothiazide (HYDRODIURIL) 25 MG tablet, TAKE ONE TABLET BY MOUTH ONCE DAILY, Disp: 30 tablet, Rfl: 6    ibuprofen (ADVIL,MOTRIN) 800 MG tablet, Take 1 tablet (800 mg total) by mouth every 6 (six) hours as needed for Pain., Disp: 30 tablet, Rfl: 1    LINZESS 290 mcg Cap, TAKE ONE CAPSULE BY MOUTH ONCE DAILY, Disp: 30 capsule, Rfl: 11    potassium chloride SA (K-DUR,KLOR-CON) 20 MEQ tablet, Take 1 tablet (20 mEq total) by mouth 2 (two) times daily., Disp: 60 tablet, Rfl: 0    mupirocin (BACTROBAN) 2 % ointment, Apply topically 2 (two) times daily as needed., Disp: 30 g, Rfl: 0    oxycodone-acetaminophen (PERCOCET) 5-325 mg per tablet, Take 1 tablet by mouth every 4 (four) hours as needed for Pain., Disp: 20 tablet, Rfl: 0    quinapril (ACCUPRIL) 20 MG tablet, Take 1 tablet (20 mg total) by mouth  every evening., Disp: 30 tablet, Rfl: 1    traZODone (DESYREL) 100 MG tablet, Take 1/2 to 2 tablets at bedtime as needed for sleep., Disp: 60 tablet, Rfl: 1    Review of patient's allergies indicates:   Allergen Reactions    Shellfish containing products     Codeine Itching    Latex, natural rubber Hives    Peanut        ROS: Denies dizziness, headache, chest pain, shortness of breath or edema    PE:  GEN: Alert and oriented, no acute distress  HEART: Normal S1 and S2, RRR, no lower extremity edema  LUNGS: Respirations unlabored, bilaterally clear to auscultation    ASSESSMENT/PLAN:  Irasema was seen today for follow-up.    Diagnoses and all orders for this visit:    Hypertension goal BP (blood pressure) < 140/90  -     Counseled regarding medication compliance  -     Counseled regarding diet and exercise compliance    RTC in 2 weeks for b/p recheck.

## 2018-01-24 DIAGNOSIS — E87.6 DIURETIC-INDUCED HYPOKALEMIA: ICD-10-CM

## 2018-01-24 DIAGNOSIS — T50.2X5A DIURETIC-INDUCED HYPOKALEMIA: ICD-10-CM

## 2018-01-25 RX ORDER — POTASSIUM CHLORIDE 750 MG/1
TABLET, EXTENDED RELEASE ORAL
Qty: 30 TABLET | Refills: 6 | OUTPATIENT
Start: 2018-01-25

## 2018-01-25 RX ORDER — POTASSIUM CHLORIDE 20 MEQ/1
20 TABLET, EXTENDED RELEASE ORAL 2 TIMES DAILY
Qty: 60 TABLET | Refills: 3 | Status: SHIPPED | OUTPATIENT
Start: 2018-01-25 | End: 2019-02-04 | Stop reason: SDUPTHER

## 2018-01-26 ENCOUNTER — CLINICAL SUPPORT (OUTPATIENT)
Dept: INTERNAL MEDICINE | Facility: CLINIC | Age: 48
End: 2018-01-26
Payer: COMMERCIAL

## 2018-01-26 VITALS — SYSTOLIC BLOOD PRESSURE: 130 MMHG | DIASTOLIC BLOOD PRESSURE: 92 MMHG

## 2018-01-26 NOTE — PROGRESS NOTES
Pt came in for nurse visit bp check. Pt stated she had SOB, chest pain, dizziness, and headache, she stated she went to the Er. Pt bp was 130/92, pt was sitting, and bp cuff was a large. Pt stated she has been checking it regularly now.

## 2018-01-31 ENCOUNTER — TELEPHONE (OUTPATIENT)
Dept: INTERNAL MEDICINE | Facility: CLINIC | Age: 48
End: 2018-01-31

## 2018-01-31 NOTE — TELEPHONE ENCOUNTER
Called and spoke with pt. Informed pt that dr. Fernandez stated B/P not at goal.   Continue current medication.   She recommends following a low sodium diet and increasing physical activity.   Continue home b/p monitoring.   Schedule f/u with Aniceto in 2 weeks. Patient should bring b/p cuff in for evaluation at that visit. Pt verbalized understanding. She wanted appt with dr. Fernandez. She stated she would not have to miss work. With late appt.

## 2018-01-31 NOTE — PROGRESS NOTES
B/P not at goal.   Continue current medication.   I recommend following a low sodium diet and increasing physical activity.   Continue home b/p monitoring.   Schedule f/u with Aniceto in 2 weeks. Patient should bring b/p cuff in for evaluation at that visit.

## 2018-02-08 ENCOUNTER — PATIENT OUTREACH (OUTPATIENT)
Dept: ADMINISTRATIVE | Facility: HOSPITAL | Age: 48
End: 2018-02-08
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
## 2018-02-09 ENCOUNTER — OFFICE VISIT (OUTPATIENT)
Dept: PSYCHIATRY | Facility: CLINIC | Age: 48
End: 2018-02-09
Payer: COMMERCIAL

## 2018-02-09 DIAGNOSIS — F33.0 MILD EPISODE OF RECURRENT MAJOR DEPRESSIVE DISORDER: Primary | ICD-10-CM

## 2018-02-09 PROCEDURE — 99213 OFFICE O/P EST LOW 20 MIN: CPT | Mod: S$GLB,,, | Performed by: PSYCHIATRY & NEUROLOGY

## 2018-02-09 PROCEDURE — 3008F BODY MASS INDEX DOCD: CPT | Mod: S$GLB,,, | Performed by: PSYCHIATRY & NEUROLOGY

## 2018-02-09 RX ORDER — BUPROPION HYDROCHLORIDE 300 MG/1
300 TABLET ORAL DAILY
Qty: 30 TABLET | Refills: 3 | Status: SHIPPED | OUTPATIENT
Start: 2018-02-09 | End: 2018-03-19 | Stop reason: ALTCHOICE

## 2018-02-19 ENCOUNTER — OFFICE VISIT (OUTPATIENT)
Dept: INTERNAL MEDICINE | Facility: CLINIC | Age: 48
End: 2018-02-19
Payer: COMMERCIAL

## 2018-02-19 VITALS
TEMPERATURE: 97 F | OXYGEN SATURATION: 98 % | HEIGHT: 60 IN | DIASTOLIC BLOOD PRESSURE: 88 MMHG | HEART RATE: 72 BPM | BODY MASS INDEX: 34.63 KG/M2 | WEIGHT: 176.38 LBS | SYSTOLIC BLOOD PRESSURE: 130 MMHG | RESPIRATION RATE: 18 BRPM

## 2018-02-19 DIAGNOSIS — I10 HYPERTENSION GOAL BP (BLOOD PRESSURE) < 140/90: Primary | ICD-10-CM

## 2018-02-19 DIAGNOSIS — E87.6 DIURETIC-INDUCED HYPOKALEMIA: ICD-10-CM

## 2018-02-19 DIAGNOSIS — T50.2X5A DIURETIC-INDUCED HYPOKALEMIA: ICD-10-CM

## 2018-02-19 PROCEDURE — 3008F BODY MASS INDEX DOCD: CPT | Mod: S$GLB,,, | Performed by: INTERNAL MEDICINE

## 2018-02-19 PROCEDURE — 99213 OFFICE O/P EST LOW 20 MIN: CPT | Mod: S$GLB,,, | Performed by: INTERNAL MEDICINE

## 2018-02-19 PROCEDURE — 99999 PR PBB SHADOW E&M-EST. PATIENT-LVL III: CPT | Mod: PBBFAC,,, | Performed by: INTERNAL MEDICINE

## 2018-02-20 NOTE — PROGRESS NOTES
Irasema Randolph Rachrobby  47 y.o. Black or  female    Chief Complaint   Patient presents with    Follow-up     2 week htn     HPI: Here today to follow up on hypertension. Her b/p is at goal. She is taking HCTZ only. She never took quinapril. She has been monitoring her b/p at home and gets good readings. She has made lifestyle changes. Her lasts labs show a low potassium. She is taking potassium as prescribed.     Past Medical History:   Diagnosis Date    Abnormal Pap smear     history of uterine cancer    Abnormal Pap smear of cervix     Asthma     no meds since age 20    Constipation, chronic     Depression     Family history of nephrolithiasis     History of cervical dysplasia 6/27/2013    Hypertension     Menopausal syndrome (hot flashes) 6/27/2013    Nephrolithiasis 6/27/2013    Seizures     1 yr ago- caused by migraine meds--no treatment now    Urinoma     Uterine cancer 2000       Current Outpatient Prescriptions on File Prior to Visit   Medication Sig Dispense Refill    buPROPion (WELLBUTRIN XL) 300 MG 24 hr tablet Take 1 tablet (300 mg total) by mouth once daily. 30 tablet 3    hydrochlorothiazide (HYDRODIURIL) 25 MG tablet TAKE ONE TABLET BY MOUTH ONCE DAILY 30 tablet 6    ibuprofen (ADVIL,MOTRIN) 800 MG tablet Take 1 tablet (800 mg total) by mouth every 6 (six) hours as needed for Pain. 30 tablet 1    LINZESS 290 mcg Cap TAKE ONE CAPSULE BY MOUTH ONCE DAILY 30 capsule 11    potassium chloride SA (K-DUR,KLOR-CON) 20 MEQ tablet Take 1 tablet (20 mEq total) by mouth 2 (two) times daily. 60 tablet 3    epinephrine (EPIPEN) 0.3 mg/0.3 mL AtIn Inject into the muscle as needed. 2 each 6       Allergies:  Review of patient's allergies indicates:   Allergen Reactions    Shellfish containing products     Codeine Itching    Latex, natural rubber Hives    Peanut        ROS:  Denies headache, dizziness, chest pain or shortness of breath    PHYSICAL EXAM:  VITAL SIGNS:  Reviewed  GENERAL: Alert and oriented, no acute distress  HEART: Normal S1 and S2, regular rate and rhythm  LUNGS: Bilaterally clear to auscultation, respirations unlabored    ASSESSMENT/PLAN:  Irasema was seen today for follow-up.    Diagnoses and all orders for this visit:    Hypertension goal BP (blood pressure) < 140/90  -     Continue current management  -     Basic metabolic panel; Standing  -     Lipid panel; Standing    Diuretic-induced hypokalemia  -     Basic metabolic panel; Standing    F/U in 3 months

## 2018-03-02 ENCOUNTER — OFFICE VISIT (OUTPATIENT)
Dept: OBSTETRICS AND GYNECOLOGY | Facility: CLINIC | Age: 48
End: 2018-03-02
Payer: COMMERCIAL

## 2018-03-02 ENCOUNTER — PATIENT MESSAGE (OUTPATIENT)
Dept: OBSTETRICS AND GYNECOLOGY | Facility: CLINIC | Age: 48
End: 2018-03-02

## 2018-03-02 VITALS
DIASTOLIC BLOOD PRESSURE: 88 MMHG | BODY MASS INDEX: 35.14 KG/M2 | SYSTOLIC BLOOD PRESSURE: 130 MMHG | WEIGHT: 179 LBS | HEIGHT: 60 IN

## 2018-03-02 DIAGNOSIS — N93.9 VAGINAL BLEEDING: Primary | ICD-10-CM

## 2018-03-02 PROCEDURE — 3079F DIAST BP 80-89 MM HG: CPT | Mod: S$GLB,,, | Performed by: NURSE PRACTITIONER

## 2018-03-02 PROCEDURE — 99213 OFFICE O/P EST LOW 20 MIN: CPT | Mod: S$GLB,,, | Performed by: NURSE PRACTITIONER

## 2018-03-02 PROCEDURE — 3075F SYST BP GE 130 - 139MM HG: CPT | Mod: S$GLB,,, | Performed by: NURSE PRACTITIONER

## 2018-03-02 PROCEDURE — 99999 PR PBB SHADOW E&M-EST. PATIENT-LVL III: CPT | Mod: PBBFAC,,, | Performed by: NURSE PRACTITIONER

## 2018-03-02 RX ORDER — HYDROCHLOROTHIAZIDE 25 MG/1
TABLET ORAL
Qty: 30 TABLET | Refills: 6 | Status: SHIPPED | OUTPATIENT
Start: 2018-03-02 | End: 2018-05-16 | Stop reason: SDUPTHER

## 2018-03-02 NOTE — TELEPHONE ENCOUNTER
See patient portal message.  Yes, I would recommend that she come in to be evaluated.  If she is willing to see Kaley Sheppard, that is who I would recommend, since my schedule is full.  I will be here in clinic if Kaley needs any assistance from me.

## 2018-03-02 NOTE — PROGRESS NOTES
"CC: Vaginal bleeding    Irasema Vang is a 47 y.o. female  presents for vaginal bleeding after "  Using a sex toy ". Patient is s/p hysterectomy and was using a sex toy, had bright red vaginal bleeding for several hours, that has now resolve. Patient denies pelvic pain.       Past Medical History:   Diagnosis Date    Abnormal Pap smear     history of uterine cancer    Abnormal Pap smear of cervix     Asthma     no meds since age 20    Constipation, chronic     Depression     Family history of nephrolithiasis     History of cervical dysplasia 2013    Hypertension     Menopausal syndrome (hot flashes) 2013    Nephrolithiasis 2013    Seizures     1 yr ago- caused by migraine meds--no treatment now    Urinoma     Uterine cancer      Past Surgical History:   Procedure Laterality Date    COLONOSCOPY  13    non-bleeding AVMs    COLONOSCOPY W/ POLYPECTOMY      cystoscopy with stent placement      ESOPHAGOGASTRODUODENOSCOPY  13    HYSTERECTOMY      Laparoscopy  2017    URETEROSCOPY       Family History   Problem Relation Age of Onset    Cancer Maternal Grandmother     Diabetes Brother     Cancer Maternal Aunt     Breast cancer Maternal Aunt     Hypertension Maternal Aunt     Breast cancer Maternal Aunt     Hypertension Mother     Cancer Father     Colon cancer Neg Hx     Ovarian cancer Neg Hx      Social History   Substance Use Topics    Smoking status: Never Smoker    Smokeless tobacco: Never Used    Alcohol use 0.0 oz/week      Comment: occassionally      OB History      Para Term  AB Living    3       3      SAB TAB Ectopic Multiple Live Births    3                  /88 (BP Location: Right arm, Patient Position: Sitting, BP Method: Medium (Manual))   Ht 5' (1.524 m)   Wt 81.2 kg (179 lb 0.2 oz)   LMP 2001   BMI 34.96 kg/m²     ROS:  GENERAL: Denies weight gain or weight loss. Feeling well overall.   CHEST: Denies " chest pain or shortness of breath.   CARDIOVASCULAR: Denies palpitations or left sided chest pain.   ABDOMEN: No abdominal pain, constipation, diarrhea, nausea, vomiting or rectal bleeding.   URINARY: No frequency, dysuria, hematuria, or burning on urination.  REPRODUCTIVE: See HPI.       PE:   APPEARANCE: Well nourished, well developed, in no acute distress.  AFFECT: WNL, alert and oriented x 3.  PELVIC: Normal external female genitalia Vaginal cuff intant, scant brown blood. Bimanual exam shows uterus and cervix to be surgically absent. A    1. Vaginal bleeding      PLAN:  Pelvic rest  Patient to contact clinic if any increase in bleeding or pelvic pain

## 2018-03-05 ENCOUNTER — LAB VISIT (OUTPATIENT)
Dept: LAB | Facility: HOSPITAL | Age: 48
End: 2018-03-05
Attending: INTERNAL MEDICINE
Payer: COMMERCIAL

## 2018-03-05 DIAGNOSIS — E87.6 DIURETIC-INDUCED HYPOKALEMIA: ICD-10-CM

## 2018-03-05 DIAGNOSIS — I10 HYPERTENSION GOAL BP (BLOOD PRESSURE) < 140/90: ICD-10-CM

## 2018-03-05 DIAGNOSIS — T50.2X5A DIURETIC-INDUCED HYPOKALEMIA: ICD-10-CM

## 2018-03-05 LAB
ANION GAP SERPL CALC-SCNC: 8 MMOL/L
BUN SERPL-MCNC: 11 MG/DL
CALCIUM SERPL-MCNC: 9.5 MG/DL
CHLORIDE SERPL-SCNC: 101 MMOL/L
CHOLEST SERPL-MCNC: 211 MG/DL
CHOLEST/HDLC SERPL: 4.4 {RATIO}
CO2 SERPL-SCNC: 31 MMOL/L
CREAT SERPL-MCNC: 0.9 MG/DL
EST. GFR  (AFRICAN AMERICAN): >60 ML/MIN/1.73 M^2
EST. GFR  (NON AFRICAN AMERICAN): >60 ML/MIN/1.73 M^2
GLUCOSE SERPL-MCNC: 90 MG/DL
HDLC SERPL-MCNC: 48 MG/DL
HDLC SERPL: 22.7 %
LDLC SERPL CALC-MCNC: 149.6 MG/DL
NONHDLC SERPL-MCNC: 163 MG/DL
POTASSIUM SERPL-SCNC: 3.1 MMOL/L
SODIUM SERPL-SCNC: 140 MMOL/L
TRIGL SERPL-MCNC: 67 MG/DL

## 2018-03-05 PROCEDURE — 36415 COLL VENOUS BLD VENIPUNCTURE: CPT | Mod: PO

## 2018-03-05 PROCEDURE — 80048 BASIC METABOLIC PNL TOTAL CA: CPT

## 2018-03-05 PROCEDURE — 80061 LIPID PANEL: CPT
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
## 2018-03-19 ENCOUNTER — OFFICE VISIT (OUTPATIENT)
Dept: PSYCHIATRY | Facility: CLINIC | Age: 48
End: 2018-03-19
Payer: COMMERCIAL

## 2018-03-19 DIAGNOSIS — F32.1 CURRENT MODERATE EPISODE OF MAJOR DEPRESSIVE DISORDER WITHOUT PRIOR EPISODE: Primary | ICD-10-CM

## 2018-03-19 PROCEDURE — 99213 OFFICE O/P EST LOW 20 MIN: CPT | Mod: S$GLB,,, | Performed by: PSYCHIATRY & NEUROLOGY

## 2018-03-19 RX ORDER — MIRTAZAPINE 15 MG/1
15 TABLET, FILM COATED ORAL NIGHTLY
Qty: 30 TABLET | Refills: 2 | Status: SHIPPED | OUTPATIENT
Start: 2018-03-19 | End: 2018-05-17 | Stop reason: SINTOL

## 2018-05-15 ENCOUNTER — TELEPHONE (OUTPATIENT)
Dept: INTERNAL MEDICINE | Facility: CLINIC | Age: 48
End: 2018-05-15

## 2018-05-15 NOTE — TELEPHONE ENCOUNTER
I spoke with patient, patient stated her pain is not serve, she might go to Urgent Care if it gets worst, and she's keeping her appointment on Friday

## 2018-05-15 NOTE — TELEPHONE ENCOUNTER
----- Message from Jahaira Hilario sent at 5/15/2018  8:37 AM CDT -----  Contact: pt  States she's having a lot of pain on her LT side, she thinks she has a UTI and she would like to see if she can get an antibiotic called in or should she wait for her appt on Friday. Please call pt at [phone]. Thank you

## 2018-05-16 RX ORDER — HYDROCHLOROTHIAZIDE 25 MG/1
TABLET ORAL
Qty: 30 TABLET | Refills: 6 | Status: SHIPPED | OUTPATIENT
Start: 2018-05-16 | End: 2018-10-22 | Stop reason: SDUPTHER

## 2018-05-17 ENCOUNTER — OFFICE VISIT (OUTPATIENT)
Dept: PSYCHIATRY | Facility: CLINIC | Age: 48
End: 2018-05-17
Payer: COMMERCIAL

## 2018-05-17 DIAGNOSIS — F51.04 PSYCHOPHYSIOLOGICAL INSOMNIA: ICD-10-CM

## 2018-05-17 DIAGNOSIS — F33.1 MODERATE EPISODE OF RECURRENT MAJOR DEPRESSIVE DISORDER: Primary | ICD-10-CM

## 2018-05-17 PROCEDURE — 99214 OFFICE O/P EST MOD 30 MIN: CPT | Mod: S$GLB,,, | Performed by: PSYCHIATRY & NEUROLOGY

## 2018-05-17 RX ORDER — BUPROPION HYDROCHLORIDE 150 MG/1
TABLET ORAL
Qty: 90 TABLET | Refills: 2 | Status: SHIPPED | OUTPATIENT
Start: 2018-05-17 | End: 2018-07-16 | Stop reason: SDUPTHER

## 2018-05-17 RX ORDER — ZOLPIDEM TARTRATE 5 MG/1
TABLET ORAL
Qty: 30 TABLET | Refills: 2 | Status: SHIPPED | OUTPATIENT
Start: 2018-05-17 | End: 2018-07-16 | Stop reason: SDUPTHER
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
## 2018-05-18 ENCOUNTER — OFFICE VISIT (OUTPATIENT)
Dept: INTERNAL MEDICINE | Facility: CLINIC | Age: 48
End: 2018-05-18
Payer: COMMERCIAL

## 2018-05-18 VITALS
WEIGHT: 179.69 LBS | HEIGHT: 60 IN | TEMPERATURE: 99 F | DIASTOLIC BLOOD PRESSURE: 84 MMHG | SYSTOLIC BLOOD PRESSURE: 132 MMHG | HEART RATE: 80 BPM | OXYGEN SATURATION: 98 % | BODY MASS INDEX: 35.28 KG/M2

## 2018-05-18 DIAGNOSIS — R53.83 FATIGUE, UNSPECIFIED TYPE: ICD-10-CM

## 2018-05-18 DIAGNOSIS — E78.5 HYPERLIPIDEMIA LDL GOAL <130: ICD-10-CM

## 2018-05-18 DIAGNOSIS — E66.01 SEVERE OBESITY (BMI 35.0-35.9 WITH COMORBIDITY): ICD-10-CM

## 2018-05-18 DIAGNOSIS — E87.6 DIURETIC-INDUCED HYPOKALEMIA: ICD-10-CM

## 2018-05-18 DIAGNOSIS — K59.09 CONSTIPATION, CHRONIC: ICD-10-CM

## 2018-05-18 DIAGNOSIS — I10 HYPERTENSION GOAL BP (BLOOD PRESSURE) < 140/90: Primary | ICD-10-CM

## 2018-05-18 DIAGNOSIS — T50.2X5A DIURETIC-INDUCED HYPOKALEMIA: ICD-10-CM

## 2018-05-18 PROCEDURE — 99999 PR PBB SHADOW E&M-EST. PATIENT-LVL III: CPT | Mod: PBBFAC,,, | Performed by: INTERNAL MEDICINE

## 2018-05-18 PROCEDURE — 99214 OFFICE O/P EST MOD 30 MIN: CPT | Mod: S$GLB,,, | Performed by: INTERNAL MEDICINE

## 2018-05-18 PROCEDURE — 3075F SYST BP GE 130 - 139MM HG: CPT | Mod: CPTII,S$GLB,, | Performed by: INTERNAL MEDICINE

## 2018-05-18 PROCEDURE — 3008F BODY MASS INDEX DOCD: CPT | Mod: CPTII,S$GLB,, | Performed by: INTERNAL MEDICINE

## 2018-05-18 PROCEDURE — 3079F DIAST BP 80-89 MM HG: CPT | Mod: CPTII,S$GLB,, | Performed by: INTERNAL MEDICINE

## 2018-05-20 PROBLEM — F51.04 PSYCHOPHYSIOLOGICAL INSOMNIA: Status: ACTIVE | Noted: 2018-05-20

## 2018-05-21 NOTE — PROGRESS NOTES
Subjective:       Patient ID: Irasema Vang is a 47 y.o. female.    Chief Complaint: Follow-up    Irasema Vang  47 y.o. Black or  female    Patient presents with:  Follow-up    HPI: Here today to follow up on chronic conditions.  HTN--stable on HCTZ. She takes potassium due to being on a diuretic.   HLD--she is not on a statin.              LDLCALC                  149.6               03/05/2018            She has been having fatigue. Her last labs did not show anemia but she is concerned about her B 12. Her TSH was normal a year ago.   Constipation--chronic. She is stable on Linzess     Past Medical History:  Abnormal Pap smear      Comment: history of uterine cancer  Abnormal Pap smear of cervix  Asthma      Comment: no meds since age 20  Constipation, chronic  Depression  Family history of nephrolithiasis  6/27/2013: History of cervical dysplasia  No date: Hypertension  6/27/2013: Menopausal syndrome (hot flashes)  6/27/2013: Nephrolithiasis  Renal cell carcinoma  Seizures      Comment: 1 yr ago- caused by migraine meds--no                treatment now  Urinoma  2000: Uterine cancer    Current Outpatient Prescriptions on File Prior to Visit:  buPROPion (WELLBUTRIN XL) 150 MG TB24 tablet, Take 1 tablet daily for 2 days then 2 tabs daily for 2 days then 3 daily thereafter, Disp: 90 tablet, Rfl: 2  epinephrine (EPIPEN) 0.3 mg/0.3 mL AtIn, Inject into the muscle as needed., Disp: 2 each, Rfl: 6  hydroCHLOROthiazide (HYDRODIURIL) 25 MG tablet, TAKE ONE TABLET BY MOUTH ONCE DAILY, Disp: 30 tablet, Rfl: 6  ibuprofen (ADVIL,MOTRIN) 800 MG tablet, Take 1 tablet (800 mg total) by mouth every 6 (six) hours as needed for Pain., Disp: 30 tablet, Rfl: 1  LINZESS 290 mcg Cap, TAKE ONE CAPSULE BY MOUTH ONCE DAILY, Disp: 30 capsule, Rfl: 11  potassium chloride SA (K-DUR,KLOR-CON) 20 MEQ tablet, Take 1 tablet (20 mEq total) by mouth 2 (two) times daily., Disp: 60 tablet, Rfl: 3  zolpidem (AMBIEN) 5 MG Tab,  Take 1/2 to 1 tablet at bedtime as needed for sleep, Disp: 30 tablet, Rfl: 2  mupirocin (BACTROBAN) 2 % ointment, Apply topically 2 (two) times daily as needed., Disp: 30 g, Rfl: 0    Allergies:  Review of patient's allergies indicates:   -- Shellfish containing products    -- Codeine -- Itching   -- Latex, natural rubber -- Hives   -- Peanut           Review of Systems   Constitutional: Positive for activity change and unexpected weight change.   HENT: Negative for hearing loss, rhinorrhea and trouble swallowing.    Eyes: Positive for visual disturbance. Negative for discharge.   Respiratory: Negative for chest tightness and wheezing.    Cardiovascular: Negative for chest pain and palpitations.   Gastrointestinal: Negative for blood in stool, constipation, diarrhea and vomiting.   Endocrine: Positive for polydipsia and polyuria.   Genitourinary: Positive for dysuria and hematuria. Negative for difficulty urinating and menstrual problem.   Musculoskeletal: Negative for arthralgias, joint swelling and neck pain.   Neurological: Positive for weakness. Negative for headaches.   Psychiatric/Behavioral: Positive for confusion and dysphoric mood.       Objective:      Physical Exam   Constitutional: She is oriented to person, place, and time. She appears well-developed and well-nourished. No distress.   Eyes: No scleral icterus.   Cardiovascular: Normal rate, regular rhythm and normal heart sounds.    Pulmonary/Chest: Effort normal and breath sounds normal. No respiratory distress.   Abdominal: Soft. Bowel sounds are normal.   Neurological: She is alert and oriented to person, place, and time.   Skin: Skin is warm and dry.   Psychiatric: She has a normal mood and affect.   Vitals reviewed.      Assessment:       1. Hypertension goal BP (blood pressure) < 140/90    2. Diuretic-induced hypokalemia    3. Hyperlipidemia LDL goal <130    4. Fatigue, unspecified type    5. Constipation, chronic    6. Severe obesity (BMI  35.0-35.9 with comorbidity)        Plan:       Irasema was seen today for follow-up.    Diagnoses and all orders for this visit:    Hypertension goal BP (blood pressure) < 140/90  -     Continue current management    Diuretic-induced hypokalemia  -     Check potassium    Hyperlipidemia LDL goal <130  -     Check lipid panel  -     Lifestyle modifications discussed    Fatigue, unspecified type  -     Vitamin B12; Future    Constipation, chronic  -     Continue current management    Severe obesity (BMI 35.0-35.9 with comorbidity)  -     Lifestyle modifications discussed    Schedule labs.     F/U in 6 months and as needed.

## 2018-06-08 ENCOUNTER — TELEPHONE (OUTPATIENT)
Dept: OBSTETRICS AND GYNECOLOGY | Facility: CLINIC | Age: 48
End: 2018-06-08

## 2018-06-08 NOTE — TELEPHONE ENCOUNTER
Spoke with patient; she states she had a CT scan it showed a cystic area where her her right ovary used to be.  She states she has the report and will forward it to Dr. Monroy for review and advice,.

## 2018-06-08 NOTE — TELEPHONE ENCOUNTER
----- Message from Lilian Stevens sent at 6/8/2018 12:51 PM CDT -----  Contact: self [phone]  States that she had a CT done and that it shows a cyst. Please call back at [phone]//thank you acc

## 2018-06-12 ENCOUNTER — PATIENT MESSAGE (OUTPATIENT)
Dept: OBSTETRICS AND GYNECOLOGY | Facility: CLINIC | Age: 48
End: 2018-06-12

## 2018-06-12 DIAGNOSIS — N83.201 RIGHT OVARIAN CYST: Primary | ICD-10-CM

## 2018-06-19 ENCOUNTER — PATIENT MESSAGE (OUTPATIENT)
Dept: INTERNAL MEDICINE | Facility: CLINIC | Age: 48
End: 2018-06-19

## 2018-06-19 ENCOUNTER — PATIENT MESSAGE (OUTPATIENT)
Dept: OBSTETRICS AND GYNECOLOGY | Facility: CLINIC | Age: 48
End: 2018-06-19

## 2018-06-20 ENCOUNTER — OFFICE VISIT (OUTPATIENT)
Dept: INTERNAL MEDICINE | Facility: CLINIC | Age: 48
End: 2018-06-20
Payer: COMMERCIAL

## 2018-06-20 ENCOUNTER — TELEPHONE (OUTPATIENT)
Dept: INTERNAL MEDICINE | Facility: CLINIC | Age: 48
End: 2018-06-20

## 2018-06-20 VITALS
HEIGHT: 60 IN | TEMPERATURE: 99 F | DIASTOLIC BLOOD PRESSURE: 88 MMHG | BODY MASS INDEX: 34.97 KG/M2 | SYSTOLIC BLOOD PRESSURE: 128 MMHG | WEIGHT: 178.13 LBS | OXYGEN SATURATION: 99 % | HEART RATE: 84 BPM

## 2018-06-20 DIAGNOSIS — J01.90 ACUTE BACTERIAL SINUSITIS: Primary | ICD-10-CM

## 2018-06-20 DIAGNOSIS — B96.89 ACUTE BACTERIAL SINUSITIS: Primary | ICD-10-CM

## 2018-06-20 PROCEDURE — 99999 PR PBB SHADOW E&M-EST. PATIENT-LVL III: CPT | Mod: PBBFAC,,, | Performed by: INTERNAL MEDICINE

## 2018-06-20 PROCEDURE — 99213 OFFICE O/P EST LOW 20 MIN: CPT | Mod: S$GLB,,, | Performed by: INTERNAL MEDICINE

## 2018-06-20 PROCEDURE — 3008F BODY MASS INDEX DOCD: CPT | Mod: CPTII,S$GLB,, | Performed by: INTERNAL MEDICINE

## 2018-06-20 PROCEDURE — 3074F SYST BP LT 130 MM HG: CPT | Mod: CPTII,S$GLB,, | Performed by: INTERNAL MEDICINE

## 2018-06-20 PROCEDURE — 3079F DIAST BP 80-89 MM HG: CPT | Mod: CPTII,S$GLB,, | Performed by: INTERNAL MEDICINE

## 2018-06-20 RX ORDER — METHYLPREDNISOLONE 4 MG/1
TABLET ORAL
Qty: 1 PACKAGE | Refills: 0 | Status: SHIPPED | OUTPATIENT
Start: 2018-06-20 | End: 2018-06-26

## 2018-06-20 RX ORDER — AZITHROMYCIN 250 MG/1
TABLET, FILM COATED ORAL
Qty: 6 TABLET | Refills: 0 | Status: SHIPPED | OUTPATIENT
Start: 2018-06-20 | End: 2018-06-25

## 2018-06-20 RX ORDER — PROMETHAZINE HYDROCHLORIDE AND DEXTROMETHORPHAN HYDROBROMIDE 6.25; 15 MG/5ML; MG/5ML
5 SYRUP ORAL EVERY 8 HOURS PRN
Qty: 118 ML | Refills: 0 | Status: SHIPPED | OUTPATIENT
Start: 2018-06-20 | End: 2018-06-30

## 2018-06-20 NOTE — PATIENT INSTRUCTIONS
Sinusitis (Antibiotic Treatment)    The sinuses are air-filled spaces within the bones of the face. They connect to the inside of the nose. Sinusitis is an inflammation of the tissue lining the sinus cavity. Sinus inflammation can occur during a cold. It can also be due to allergies to pollens and other particles in the air. Sinusitis can cause symptoms of sinus congestion and fullness. A sinus infection causes fever, headache and facial pain. There is often green or yellow drainage from the nose or into the back of the throat (post-nasal drip). You have been given antibiotics to treat this condition.  Home care:  · Take the full course of antibiotics as instructed. Do not stop taking them, even if you feel better.  · Drink plenty of water, hot tea, and other liquids. This may help thin mucus. It also may promote sinus drainage.  · Heat may help soothe painful areas of the face. Use a towel soaked in hot water. Or,  the shower and direct the hot spray onto your face. Using a vaporizer along with a menthol rub at night may also help.   · An expectorant containing guaifenesin may help thin the mucus and promote drainage from the sinuses.  · Over-the-counter decongestants may be used unless a similar medicine was prescribed. Nasal sprays work the fastest. Use one that contains phenylephrine or oxymetazoline. First blow the nose gently. Then use the spray. Do not use these medicines more often than directed on the label or symptoms may get worse. You may also use tablets containing pseudoephedrine. Avoid products that combine ingredients, because side effects may be increased. Read labels. You can also ask the pharmacist for help. (NOTE: Persons with high blood pressure should not use decongestants. They can raise blood pressure.)  · Over-the-counter antihistamines may help if allergies contributed to your sinusitis.    · Do not use nasal rinses or irrigation during an acute sinus infection, unless told to by  your health care provider. Rinsing may spread the infection to other sinuses.  · Use acetaminophen or ibuprofen to control pain, unless another pain medicine was prescribed. (If you have chronic liver or kidney disease or ever had a stomach ulcer, talk with your doctor before using these medicines. Aspirin should never be used in anyone under 18 years of age who is ill with a fever. It may cause severe liver damage.)  · Don't smoke. This can worsen symptoms.  Follow-up care  Follow up with your healthcare provider or our staff if you are not improving within the next week.  When to seek medical advice  Call your healthcare provider if any of these occur:  · Facial pain or headache becoming more severe  · Stiff neck  · Unusual drowsiness or confusion  · Swelling of the forehead or eyelids  · Vision problems, including blurred or double vision  · Fever of 100.4ºF (38ºC) or higher, or as directed by your healthcare provider  · Seizure  · Breathing problems  · Symptoms not resolving within 10 days  Date Last Reviewed: 4/13/2015  © 3727-1499 [employer]. [address] [place] [address], [place] [address]. All rights reserved. This information is not intended as a substitute for professional medical care. Always follow your healthcare professional's instructions.

## 2018-06-20 NOTE — TELEPHONE ENCOUNTER
I spoke with patient, patient stated she spoke with someone already and they scheduled her appointment for today at 11:00 am. I told patient okay we will see at 11 and patient stated

## 2018-06-20 NOTE — PROGRESS NOTES
Irasema [facility]  47 y.o. Black or  female     Chief Complaint   Patient presents with    Sinus Problem      HPI: Presents to the clinic with complaint of sinus congestion, post nasal drainage and a cough x 4 days. Her symptoms worsened yesterday. Her cough became productive of a greenish sputum. She also had some wheezing. She denies having a fever. She has been taking Coricidin HBP without relief.     PMH: Reviewed    MEDS: Reviewed med card    ALLERGIES: Reviewed allergy card    PE: Reviewed vitals  GENERAL: Alert and oriented, no acute distress  ENT: No tonsillar exudate or pharyngeal erythema  NECK: No lymphadenopathy  HEART: Regular rate and rhythm  LUNGS: Unlabored respirations, bilaterally clear to auscultation     ASSESSMENT/PLAN:    Irasema was seen today for sinus problem.    Diagnoses and all orders for this visit:    Acute bacterial sinusitis  -     azithromycin (Z-TIFFANY) 250 MG tablet; Take 2 tablets by mouth on day 1; Take 1 tablet by mouth on days 2-5  -     methylPREDNISolone (MEDROL DOSEPACK) 4 mg tablet; Take as directed  -     promethazine-dextromethorphan (PROMETHAZINE-DM) 6.25-15 mg/5 mL Syrp; Take 5 mLs by mouth every 8 (eight) hours as needed.  -     Recommend increased fluids and rest  -     Educational information provided    RTC: If symptoms worsen or fail to resolve

## 2018-06-20 NOTE — TELEPHONE ENCOUNTER
I spoke with patient, patient told me she spoke with someone already and they scheduled her appointment for today at 11:00. I told patient we will see her at 11 and she stated okay

## 2018-06-27 ENCOUNTER — PATIENT MESSAGE (OUTPATIENT)
Dept: INTERNAL MEDICINE | Facility: CLINIC | Age: 48
End: 2018-06-27

## 2018-07-02 ENCOUNTER — TELEPHONE (OUTPATIENT)
Dept: RADIOLOGY | Facility: HOSPITAL | Age: 48
End: 2018-07-02

## 2018-07-02 ENCOUNTER — PATIENT MESSAGE (OUTPATIENT)
Dept: PSYCHIATRY | Facility: CLINIC | Age: 48
End: 2018-07-02

## 2018-07-03 ENCOUNTER — HOSPITAL ENCOUNTER (OUTPATIENT)
Dept: RADIOLOGY | Facility: HOSPITAL | Age: 48
Discharge: HOME | End: 2018-07-03
Attending: OBSTETRICS & GYNECOLOGY
Payer: COMMERCIAL

## 2018-07-03 DIAGNOSIS — N83.201 RIGHT OVARIAN CYST: ICD-10-CM

## 2018-07-03 PROCEDURE — 76856 US EXAM PELVIC COMPLETE: CPT | Mod: TC,PO

## 2018-07-03 PROCEDURE — 76830 TRANSVAGINAL US NON-OB: CPT | Mod: TC,PO

## 2018-07-03 PROCEDURE — 76856 US EXAM PELVIC COMPLETE: CPT | Mod: 26,,, | Performed by: RADIOLOGY

## 2018-07-03 PROCEDURE — 76830 TRANSVAGINAL US NON-OB: CPT | Mod: 26,,, | Performed by: RADIOLOGY

## 2018-07-09 ENCOUNTER — TELEPHONE (OUTPATIENT)
Dept: OBSTETRICS AND GYNECOLOGY | Facility: CLINIC | Age: 48
End: 2018-07-09

## 2018-07-09 NOTE — TELEPHONE ENCOUNTER
Spoke with pt. Scheduled u/s f/u and annual for 8/30/18 at 3:00 with Dr. Monroy at the [facility] location. Pt verbalized understanding.

## 2018-07-09 NOTE — TELEPHONE ENCOUNTER
----- Message from Radha Schumacher sent at 7/9/2018  3:56 PM CDT -----  Patient state she read the provider message regarding her u/s. State need to know if she need to come in for an appt. Please adv/call [phone].//cw

## 2018-07-16 ENCOUNTER — OFFICE VISIT (OUTPATIENT)
Dept: PSYCHIATRY | Facility: CLINIC | Age: 48
End: 2018-07-16
Payer: COMMERCIAL

## 2018-07-16 DIAGNOSIS — F33.1 MODERATE EPISODE OF RECURRENT MAJOR DEPRESSIVE DISORDER: Primary | ICD-10-CM

## 2018-07-16 PROCEDURE — 99213 OFFICE O/P EST LOW 20 MIN: CPT | Mod: S$GLB,,, | Performed by: PSYCHIATRY & NEUROLOGY

## 2018-07-16 RX ORDER — BUSPIRONE HYDROCHLORIDE 30 MG/1
30 TABLET ORAL 2 TIMES DAILY
Qty: 60 TABLET | Refills: 1 | Status: SHIPPED | OUTPATIENT
Start: 2018-07-16 | End: 2018-09-18

## 2018-07-16 RX ORDER — BUPROPION HYDROCHLORIDE 150 MG/1
TABLET ORAL
Qty: 90 TABLET | Refills: 1 | Status: SHIPPED | OUTPATIENT
Start: 2018-07-16 | End: 2018-09-27 | Stop reason: SDUPTHER

## 2018-07-16 RX ORDER — ZOLPIDEM TARTRATE 5 MG/1
TABLET ORAL
Qty: 30 TABLET | Refills: 2 | Status: SHIPPED | OUTPATIENT
Start: 2018-07-16 | End: 2018-10-22 | Stop reason: SDUPTHER
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
## 2018-08-11 ENCOUNTER — LAB VISIT (OUTPATIENT)
Dept: LAB | Facility: HOSPITAL | Age: 48
End: 2018-08-11
Attending: INTERNAL MEDICINE
Payer: COMMERCIAL

## 2018-08-11 DIAGNOSIS — R53.83 FATIGUE, UNSPECIFIED TYPE: ICD-10-CM

## 2018-08-11 DIAGNOSIS — T50.2X5A DIURETIC-INDUCED HYPOKALEMIA: ICD-10-CM

## 2018-08-11 DIAGNOSIS — E87.6 DIURETIC-INDUCED HYPOKALEMIA: ICD-10-CM

## 2018-08-11 DIAGNOSIS — I10 HYPERTENSION GOAL BP (BLOOD PRESSURE) < 140/90: ICD-10-CM

## 2018-08-11 LAB
ANION GAP SERPL CALC-SCNC: 9 MMOL/L
BUN SERPL-MCNC: 14 MG/DL
CALCIUM SERPL-MCNC: 9.9 MG/DL
CHLORIDE SERPL-SCNC: 101 MMOL/L
CHOLEST SERPL-MCNC: 205 MG/DL
CHOLEST/HDLC SERPL: 4.4 {RATIO}
CO2 SERPL-SCNC: 27 MMOL/L
CREAT SERPL-MCNC: 0.9 MG/DL
EST. GFR  (AFRICAN AMERICAN): >60 ML/MIN/1.73 M^2
EST. GFR  (NON AFRICAN AMERICAN): >60 ML/MIN/1.73 M^2
GLUCOSE SERPL-MCNC: 87 MG/DL
HDLC SERPL-MCNC: 47 MG/DL
HDLC SERPL: 22.9 %
LDLC SERPL CALC-MCNC: 140.2 MG/DL
NONHDLC SERPL-MCNC: 158 MG/DL
POTASSIUM SERPL-SCNC: 3.6 MMOL/L
SODIUM SERPL-SCNC: 137 MMOL/L
TRIGL SERPL-MCNC: 89 MG/DL
VIT B12 SERPL-MCNC: 1669 PG/ML

## 2018-08-11 PROCEDURE — 36415 COLL VENOUS BLD VENIPUNCTURE: CPT | Mod: PO

## 2018-08-11 PROCEDURE — 80048 BASIC METABOLIC PNL TOTAL CA: CPT

## 2018-08-11 PROCEDURE — 82607 VITAMIN B-12: CPT

## 2018-08-11 PROCEDURE — 80061 LIPID PANEL: CPT

## 2018-08-16 ENCOUNTER — HOSPITAL ENCOUNTER (OUTPATIENT)
Dept: RADIOLOGY | Facility: HOSPITAL | Age: 48
Discharge: HOME | End: 2018-08-16
Attending: FAMILY MEDICINE
Payer: COMMERCIAL

## 2018-08-16 ENCOUNTER — OFFICE VISIT (OUTPATIENT)
Dept: INTERNAL MEDICINE | Facility: CLINIC | Age: 48
End: 2018-08-16
Payer: COMMERCIAL

## 2018-08-16 VITALS
HEIGHT: 60 IN | WEIGHT: 173.75 LBS | OXYGEN SATURATION: 98 % | HEART RATE: 74 BPM | DIASTOLIC BLOOD PRESSURE: 70 MMHG | BODY MASS INDEX: 34.11 KG/M2 | SYSTOLIC BLOOD PRESSURE: 126 MMHG | TEMPERATURE: 96 F

## 2018-08-16 DIAGNOSIS — R11.2 NON-INTRACTABLE VOMITING WITH NAUSEA, UNSPECIFIED VOMITING TYPE: ICD-10-CM

## 2018-08-16 DIAGNOSIS — R10.816 EPIGASTRIC ABDOMINAL TENDERNESS WITHOUT REBOUND TENDERNESS: ICD-10-CM

## 2018-08-16 DIAGNOSIS — G89.29 CHRONIC PELVIC PAIN IN FEMALE: ICD-10-CM

## 2018-08-16 DIAGNOSIS — R10.13 EPIGASTRIC ABDOMINAL PAIN: Primary | ICD-10-CM

## 2018-08-16 DIAGNOSIS — R10.2 CHRONIC PELVIC PAIN IN FEMALE: ICD-10-CM

## 2018-08-16 DIAGNOSIS — R10.13 EPIGASTRIC ABDOMINAL PAIN: ICD-10-CM

## 2018-08-16 DIAGNOSIS — K58.1 IRRITABLE BOWEL SYNDROME WITH CONSTIPATION: Chronic | ICD-10-CM

## 2018-08-16 PROCEDURE — 74018 RADEX ABDOMEN 1 VIEW: CPT | Mod: TC,FY,PO

## 2018-08-16 PROCEDURE — 3008F BODY MASS INDEX DOCD: CPT | Mod: CPTII,S$GLB,, | Performed by: FAMILY MEDICINE

## 2018-08-16 PROCEDURE — 74018 RADEX ABDOMEN 1 VIEW: CPT | Mod: 26,,, | Performed by: RADIOLOGY

## 2018-08-16 PROCEDURE — 3074F SYST BP LT 130 MM HG: CPT | Mod: CPTII,S$GLB,, | Performed by: FAMILY MEDICINE

## 2018-08-16 PROCEDURE — 3078F DIAST BP <80 MM HG: CPT | Mod: CPTII,S$GLB,, | Performed by: FAMILY MEDICINE

## 2018-08-16 PROCEDURE — 99999 PR PBB SHADOW E&M-EST. PATIENT-LVL V: CPT | Mod: PBBFAC,,, | Performed by: FAMILY MEDICINE

## 2018-08-16 PROCEDURE — 99214 OFFICE O/P EST MOD 30 MIN: CPT | Mod: S$GLB,,, | Performed by: FAMILY MEDICINE

## 2018-08-16 RX ORDER — ONDANSETRON 4 MG/1
4 TABLET, FILM COATED ORAL EVERY 6 HOURS PRN
Qty: 10 TABLET | Refills: 1 | Status: SHIPPED | OUTPATIENT
Start: 2018-08-16 | End: 2018-08-21

## 2018-08-16 NOTE — ASSESSMENT & PLAN NOTE
This problem is NEW TO ME. This problem appears to be COMPLICATED with UNCERTAIN PROGNOSIS and REQUIRES FURTHER WORK-UP.

## 2018-08-16 NOTE — PROGRESS NOTES
CHIEF COMPLAINT  Abdominal Pain      HISTORY OF PRESENT ILLNESS    PROBLEM/CONDITION: NEW PROBLEM is abdominal pain. ONSET was about a day and a half ago. QUALITY described as sharp and aching pain. LOCATION is described as epigastric region with radiation posteriorly. ASSOCIATED SYMPTOMS include bloating, belching, nausea, and vomiting. Last emesis was yesterday. Last bowel movement was 2 days ago and was reported as normal for her. (She has underlying chronic irritable bowel syndrome, constipation predominant.) She says that she can drink, but she says that she cannot keep down solids. She has only tried drinking today. TIMING of symptoms described as improving. She says that she is about 15 to 20% better than she was last night. I reviewed her lab and x-ray results with her. I explained to her that as of now I was uncertain of the cause of her gastrointestinal symptoms. We discussed differential diagnosis (pancreatitis, gastritis, viral gastroenteritis, etc.). It was agreed to begin a trial of clear liquids, with Zofran as needed for nausea. (She says she has taken Zofran in the past without adverse effect.) I told her to expect significant improvement over the next couple of days, and she could then gradually advance her diet as tolerated. She is to let me know if she is not improving as expected.    PROBLEM/CONDITION: She says that she is unsatisfied with the control of her symptoms from irritable bowel syndrome, and she is requesting gastroenterology consultation.    PROBLEM/CONDITION: She has history of chronic pelvic pain, and she says that her current problem is distinct from her chronic pelvic pain.    No other complaints or concerns reported.    Problem List Items Addressed This Visit        GI    Chronic pelvic pain in female    Overview           Radiology images independently reviewed by me and radiology report reviewed:     US PELVIS COMP WITH TRANSVAG NON-OB (7/3/18)    COMPARISON: July 29, 2016.     FINDINGS: Uterus: Status post hysterectomy.    Right ovary: Size: 1.9 x 0.9 x 0.9 cm    Appearance: Anechoic tubular structure with the serpiginous appearance identified within the right adnexa abutting the ovary. Margins are slightly irregular and there is no internal blood flow. Appearance is suggestive of distended fallopian tube. Correlate clinically with follow-up as warranted. No adjacent free fluid.    Vascular flow: Normal.    Left ovary: Size: 1.4 x 1.0 x 1.2 cm    Appearance: Normal    Vascular Flow: Normal.    Free Fluid: None.    IMPRESSION: Status post hysterectomy. Unremarkable left ovary. Normal appearance of the right ovary with adjacent distended tubular structure most suggestive of dilated fallopian tube. See above.         Epigastric abdominal pain - Primary    Current Assessment & Plan     This problem is NEW TO ME. This problem appears to be COMPLICATED with UNCERTAIN PROGNOSIS and REQUIRES FURTHER WORK-UP.         Relevant Orders    X-Ray Abdomen AP 1 View (Completed)    CBC auto differential (Completed)    Comprehensive metabolic panel (Completed)    Lipase    URINALYSIS (Completed)    Irritable bowel syndrome with constipation (Chronic)    Relevant Orders    Ambulatory referral to Gastroenterology      Other Visit Diagnoses     Non-intractable vomiting with nausea, unspecified vomiting type        Relevant Medications    ondansetron (ZOFRAN) 4 MG tablet    Other Relevant Orders    X-Ray Abdomen AP 1 View (Completed)    CBC auto differential (Completed)    Comprehensive metabolic panel (Completed)    Lipase    URINALYSIS (Completed)    Epigastric abdominal tenderness without rebound tenderness        Relevant Orders    X-Ray Abdomen AP 1 View (Completed)    CBC auto differential (Completed)    Comprehensive metabolic panel (Completed)    Lipase    URINALYSIS (Completed)        Radiology images independently reviewed by me and radiology report reviewed:     XR ABDOMEN AP 1 VIEW:  Intestinal  gas pattern is nonspecific with mild amount of retained feces in the right colon. There is no bowel obstruction or ileus. Multiple phleboliths in the pelvis.     PAST MEDICAL HISTORY, FAMILY HISTORY and SOCIAL HISTORY reviewed by me (WILMAR Fontenot MD) and are updated consistent with the patient's report.    CURRENT MEDICATION LIST, and ALLERGY LIST reviewed by me (WILMAR Fontenot MD) and are updated consistent with the patient's report as follows:    REVIEW OF SYSTEMS  CONSTITUTIONAL: No fever or chills reported.  GENITOURINARY: No dysuria or hematuria reported.  GASTROINTESTINAL: No hematemesis or melena reported.     PHYSICAL EXAM  Vitals:    08/16/18 1044   BP: 126/70   BP Location: Right arm   Patient Position: Sitting   BP Method: Medium (Manual)   Pulse: 74   Temp: 96.3 °F (35.7 °C)   TempSrc: Tympanic   SpO2: 98%   Weight: 78.8 kg (173 lb 11.6 oz)   Height: 5' (1.524 m)     CONSTITUTIONAL: Vital signs noted. No apparent distress. Does not appear acutely ill or septic. Appears adequately hydrated.  EYE: Sclerae anicteric. Lids and conjunctiva unremarkable.  ENT: External ENT unremarkable. Oropharynx moist.  NECK: Trachea midline. Thyroid nontender.  PULM: Lungs clear. Breathing unlabored.  CV: Auscultation reveals regular rate and rhythm without murmur, gallop or rub. No carotid bruit.  ABDOMEN: Soft. She has moderate epigastric tenderness without guarding or rebound tenderness. Abdomen is otherwise nontender.  Bowel sounds normal. No appreciable organomegaly or abdominal mass on palpation. Abdominal aorta nonpalpable. No abdominal bruit.   DERM: Skin warm and moist with normal turgor.  NEURO: There are no gross focal motor deficits or gross deficits of cranial nerves III-XII.  PSYCH: Alert and oriented x 3. Mood is grossly neutral. Affect appropriate. Judgment and insight not grossly compromised.  MSK: Grossly normal stance and gait.     ASSESSMENT and PLAN  Epigastric abdominal pain  -     X-Ray  Abdomen AP 1 View; Future; Expected date: 08/16/2018  -     CBC auto differential; Future; Expected date: 08/16/2018  -     Comprehensive metabolic panel; Future; Expected date: 08/16/2018  -     Lipase; Future; Expected date: 08/16/2018  -     URINALYSIS; Future; Expected date: 08/16/2018    Non-intractable vomiting with nausea, unspecified vomiting type  -     X-Ray Abdomen AP 1 View; Future; Expected date: 08/16/2018  -     CBC auto differential; Future; Expected date: 08/16/2018  -     Comprehensive metabolic panel; Future; Expected date: 08/16/2018  -     Lipase; Future; Expected date: 08/16/2018  -     URINALYSIS; Future; Expected date: 08/16/2018  -     ondansetron (ZOFRAN) 4 MG tablet; Take 1 tablet (4 mg total) by mouth every 6 (six) hours as needed for Nausea.  Dispense: 10 tablet; Refill: 1    Epigastric abdominal tenderness without rebound tenderness  -     X-Ray Abdomen AP 1 View; Future; Expected date: 08/16/2018  -     CBC auto differential; Future; Expected date: 08/16/2018  -     Comprehensive metabolic panel; Future; Expected date: 08/16/2018  -     Lipase; Future; Expected date: 08/16/2018  -     URINALYSIS; Future; Expected date: 08/16/2018    Irritable bowel syndrome with constipation  -     Ambulatory referral to Gastroenterology    Chronic pelvic pain in female        PRESCRIPTION MEDICATION MANAGEMENT  Except as noted below, CURRENT MEDICATIONS are to remain unchanged from that listed above.  Medications Ordered This Encounter   Medications    ondansetron (ZOFRAN) 4 MG tablet     Sig: Take 1 tablet (4 mg total) by mouth every 6 (six) hours as needed for Nausea.     Dispense:  10 tablet     Refill:  1     There are no discontinued medications.    Follow-up if symptoms worsen or fail to improve.    Patient Instructions       Abdominal Pain    Abdominal pain is pain in the stomach or belly area. Everyone has this pain from time to time. In many cases it goes away on its own. But abdominal pain  can sometimes be due to a serious problem, such as appendicitis. So its important to know when to seek help.  Causes of abdominal pain  There are many possible causes of abdominal pain. Common causes in adults include:  · Constipation, diarrhea, or gas  · Stomach acid flowing back up into the esophagus (acid reflux or heartburn)  · Severe acid reflux, called GERD (gastroesophageal reflux disease)  · A sore in the lining of the stomach or small intestine (peptic ulcer)  · Inflammation of the gallbladder, liver, or pancreas  · Gallstones or kidney stones  · Appendicitis   · Intestinal blockage   · An internal organ pushing through a muscle or other tissue (hernia)  · Urinary tract infections  · In women, menstrual cramps, fibroids, or endometriosis  · Inflammation or infection of the intestines  Diagnosing the cause of abdominal pain  Your healthcare provider will do a physical exam help find the cause of your pain. If needed, tests will be ordered. Belly pain has many possible causes. So it can be hard to find the reason for your pain. Giving details about your pain can help. Tell your provider where and when you feel the pain, and what makes it better or worse. Also let your provider know if you have other symptoms such as:  · Fever  · Tiredness  · Upset stomach (nausea)  · Vomiting  · Changes in bathroom habits  Treating abdominal pain  Some causes of pain need emergency medical treatment right away. These include appendicitis or a bowel blockage. Other problems can be treated with rest, fluids, or medicines. Your healthcare provider can give you specific instructions for treatment or self-care based on what is causing your pain.  If you have vomiting or diarrhea, sip water or other clear fluids. When you are ready to eat solid foods again, start with small amounts of easy-to-digest, low-fat foods. These include apple sauce, toast, or crackers.   When to seek medical care  Call 911 or go to the hospital right away  if you:  · Cant pass stool and are vomiting  · Are vomiting blood or have bloody diarrhea or black, tarry diarrhea  · Have chest, neck, or shoulder pain  · Feel like you might pass out  · Have pain in your shoulder blades with nausea  · Have sudden, severe belly pain  · Have new, severe pain unlike any you have felt before  · Have a belly that is rigid, hard, and tender to touch  Call your healthcare provider if you have:  · Pain for more than 5 days  · Bloating for more than 2 days  · Diarrhea for more than 5 days  · A fever of 100.4°F (38.0°C) or higher, or as directed by your provider  · Pain that gets worse  · Weight loss for no reason  · Continued lack of appetite  · Blood in your stool  How to prevent abdominal pain  Here are some tips to help prevent abdominal pain:  · Eat smaller amounts of food at one time.  · Avoid greasy, fried, or other high-fat foods.  · Avoid foods that give you gas.  · Exercise regularly.  · Drink plenty of fluids.  To help prevent GERD symptoms:  · Quit smoking.  · Reduce alcohol and certain foods that increase stomach acid.  · Avoid aspirin and over-the-counter pain and fever medicines (NSAIDS or nonsteroidal anti-inflammatory drugs), if possible  · Lose extra weight.  · Finish eating at least 2 hours before you go to bed or lie down.  · Raise the head of your bed.  Date Last Reviewed: 7/1/2016 © 2000-2017 [employer]. [address] [place] [address], [place] [address]. All rights reserved. This information is not intended as a substitute for professional medical care. Always follow your healthcare professional's instructions.        Unknown Causes of Abdominal Pain (Female)    The exact cause of your abdominal (stomach) pain is not clear. This does not mean that this is something to worry about. Everyone likes to know the exact cause of the problem, but sometimes with abdominal pain, there is no clear-cut cause, and this could be a good thing. The good news is that your  symptoms can be treated, and you will feel better.   Your condition does not seem serious now; however, sometimes the signs of a serious problem may take more time to appear. For this reason, it is important for you to watch for any new symptoms, problems, or worsening of your condition.  Over the next few days, the abdominal pain may come and go, or be continuous. Other common symptoms can include nausea and vomiting. Sometimes it can be difficult to tell if you feel nauseous, you may just feel bad and not associate that feeling with nausea. Constipation, diarrhea, and a fever may go along with the pain.  The pain may continue even if treated correctly over the following days. Depending on how things go, sometimes the cause can become clear and may require further or different treatment. Additional evaluations, medications, or tests may also be needed.  Home care  Your healthcare provider may prescribe medicine for pain, symptoms, or an infection.  Follow the healthcare provider's instructions for taking these medicines.  General care  · Rest as much as you can until your next exam. No strenuous activities.  · Try to find positions that ease discomfort. A small pillow placed on the abdomen may help relieve pain.  · Something warm on your abdomen (such as a heating pad) may help, but be careful not to burn yourself.  Diet  · Do not force yourself to eat, especially if having cramps, vomiting, or diarrhea.  · Water is important so you do not get dehydrated. Soup may also be good. Sports drinks may also help, especially if they are not too acidic. Make sure you don't drink sugary drinks as this can make things worse. Take liquids in small amounts. Do not guzzle them.  · Caffeine sometimes makes the pain and cramping worse.  · Avoid dairy products if you have vomiting or diarrhea.  · Don't eat large amounts at a time. Wait a few minutes between bites.  · Eat a diet low in fiber (called a low-residue diet). Foods  allowed include refined breads, white rice, fruit and vegetable juices without pulp, tender meats. These foods will pass more easily through the intestine.  · Avoid whole-grain foods, whole fruits and vegetables, meats, seeds and nuts, fried or fatty foods, dairy, alcohol and spicy foods until your symptoms go away.  Follow-up care  Follow up with your healthcare provider, or as advised, if your pain does not begin to improve in the next 24 hours.  Call 911  Call 911 if any of these occur:  · Trouble breathing  · Confusion  · Fainting or loss of consciousness  · Rapid heart rate  · Seizure  When to seek medical advice  Call your healthcare provider right away if any of these occur:  · Pain gets worse or moves to the right lower abdomen  · New or worsening vomiting or diarrhea  · Swelling of the abdomen  · Unable to pass stool for more than 3 days  · Fever of 100.4ºF (38ºC) or higher, or as directed by your healthcare provider.  · Blood in vomit or bowel movements (dark red or black color)  · Jaundice (yellow color of eyes and skin)  · Weakness, dizziness  · Chest, arm, back, neck or jaw pain  · Unexpected vaginal bleeding or missed period  · Can't keep down liquids or water and are getting dehydrated  Date Last Reviewed: 12/30/2015  © 7464-9915 [employer]. [address] [place] [address], [place] [address]. All rights reserved. This information is not intended as a substitute for professional medical care. Always follow your healthcare professional's instructions.        Clear Liquid Diet    Clear liquids are any liquid that you can see through. They are also very easy to digest. You may be put on a clear liquid diet if you are recovering from irritation or infection of the stomach or intestinal tract. This diet may also be used before surgery or special procedures such as a colonoscopy. You should not be on this diet for more than 3 days. Below are some clear liquids you can have on this diet.  Adults and  "children over 2 years old  Adults should drink a total of 2 to 3 quarts of liquid per day. It may be easier to drink small frequent servings rather than a few large ones. Liquids can include:  · Fruit juices. Strained orange juice or lemonade (no pulp); apple, grape, and cranberry juice; clear fruit drinks  · Beverages. Sport drinks, sodas, mineral water (plain or flavored), tea, black coffee, liquid gelatin (add twice the recommended amount of water)  · Soups. Clear broth  · Desserts. Plain gelatin, popsicles, fruit juice bars  Children under 2 years old  Oral rehydration fluids are available at drug stores and most grocery stores. You dont need a prescription.  Date Last Reviewed: 8/1/2016  © 3388-0275 [employer]. [address] [place] [address], [place] [address]. All rights reserved. This information is not intended as a substitute for professional medical care. Always follow your healthcare professional's instructions.            ABOUT THIS DOCUMENTATION:  · The order of the conditions listed in the HPI is one of convenience and does not necessarily reflect the chronology of the appointment, nor the relative importance of a condition.  · Documentation entered by me for this encounter was done in part using speech-recognition technology. Although I have made an effort to ensure accuracy, "sound like" errors may exist and should be interpreted in context.                        -WILMAR Fontenot MD       "

## 2018-08-16 NOTE — PATIENT INSTRUCTIONS
Abdominal Pain    Abdominal pain is pain in the stomach or belly area. Everyone has this pain from time to time. In many cases it goes away on its own. But abdominal pain can sometimes be due to a serious problem, such as appendicitis. So its important to know when to seek help.  Causes of abdominal pain  There are many possible causes of abdominal pain. Common causes in adults include:  · Constipation, diarrhea, or gas  · Stomach acid flowing back up into the esophagus (acid reflux or heartburn)  · Severe acid reflux, called GERD (gastroesophageal reflux disease)  · A sore in the lining of the stomach or small intestine (peptic ulcer)  · Inflammation of the gallbladder, liver, or pancreas  · Gallstones or kidney stones  · Appendicitis   · Intestinal blockage   · An internal organ pushing through a muscle or other tissue (hernia)  · Urinary tract infections  · In women, menstrual cramps, fibroids, or endometriosis  · Inflammation or infection of the intestines  Diagnosing the cause of abdominal pain  Your healthcare provider will do a physical exam help find the cause of your pain. If needed, tests will be ordered. Belly pain has many possible causes. So it can be hard to find the reason for your pain. Giving details about your pain can help. Tell your provider where and when you feel the pain, and what makes it better or worse. Also let your provider know if you have other symptoms such as:  · Fever  · Tiredness  · Upset stomach (nausea)  · Vomiting  · Changes in bathroom habits  Treating abdominal pain  Some causes of pain need emergency medical treatment right away. These include appendicitis or a bowel blockage. Other problems can be treated with rest, fluids, or medicines. Your healthcare provider can give you specific instructions for treatment or self-care based on what is causing your pain.  If you have vomiting or diarrhea, sip water or other clear fluids. When you are ready to eat solid foods again,  start with small amounts of easy-to-digest, low-fat foods. These include apple sauce, toast, or crackers.   When to seek medical care  Call 911 or go to the hospital right away if you:  · Cant pass stool and are vomiting  · Are vomiting blood or have bloody diarrhea or black, tarry diarrhea  · Have chest, neck, or shoulder pain  · Feel like you might pass out  · Have pain in your shoulder blades with nausea  · Have sudden, severe belly pain  · Have new, severe pain unlike any you have felt before  · Have a belly that is rigid, hard, and tender to touch  Call your healthcare provider if you have:  · Pain for more than 5 days  · Bloating for more than 2 days  · Diarrhea for more than 5 days  · A fever of 100.4°F (38.0°C) or higher, or as directed by your provider  · Pain that gets worse  · Weight loss for no reason  · Continued lack of appetite  · Blood in your stool  How to prevent abdominal pain  Here are some tips to help prevent abdominal pain:  · Eat smaller amounts of food at one time.  · Avoid greasy, fried, or other high-fat foods.  · Avoid foods that give you gas.  · Exercise regularly.  · Drink plenty of fluids.  To help prevent GERD symptoms:  · Quit smoking.  · Reduce alcohol and certain foods that increase stomach acid.  · Avoid aspirin and over-the-counter pain and fever medicines (NSAIDS or nonsteroidal anti-inflammatory drugs), if possible  · Lose extra weight.  · Finish eating at least 2 hours before you go to bed or lie down.  · Raise the head of your bed.  Date Last Reviewed: 7/1/2016  © 3455-3410 [employer]. [address] [place] [address], [place] [address]. All rights reserved. This information is not intended as a substitute for professional medical care. Always follow your healthcare professional's instructions.        Unknown Causes of Abdominal Pain (Female)    The exact cause of your abdominal (stomach) pain is not clear. This does not mean that this is something to worry about.  Everyone likes to know the exact cause of the problem, but sometimes with abdominal pain, there is no clear-cut cause, and this could be a good thing. The good news is that your symptoms can be treated, and you will feel better.   Your condition does not seem serious now; however, sometimes the signs of a serious problem may take more time to appear. For this reason, it is important for you to watch for any new symptoms, problems, or worsening of your condition.  Over the next few days, the abdominal pain may come and go, or be continuous. Other common symptoms can include nausea and vomiting. Sometimes it can be difficult to tell if you feel nauseous, you may just feel bad and not associate that feeling with nausea. Constipation, diarrhea, and a fever may go along with the pain.  The pain may continue even if treated correctly over the following days. Depending on how things go, sometimes the cause can become clear and may require further or different treatment. Additional evaluations, medications, or tests may also be needed.  Home care  Your healthcare provider may prescribe medicine for pain, symptoms, or an infection.  Follow the healthcare provider's instructions for taking these medicines.  General care  · Rest as much as you can until your next exam. No strenuous activities.  · Try to find positions that ease discomfort. A small pillow placed on the abdomen may help relieve pain.  · Something warm on your abdomen (such as a heating pad) may help, but be careful not to burn yourself.  Diet  · Do not force yourself to eat, especially if having cramps, vomiting, or diarrhea.  · Water is important so you do not get dehydrated. Soup may also be good. Sports drinks may also help, especially if they are not too acidic. Make sure you don't drink sugary drinks as this can make things worse. Take liquids in small amounts. Do not guzzle them.  · Caffeine sometimes makes the pain and cramping worse.  · Avoid dairy  products if you have vomiting or diarrhea.  · Don't eat large amounts at a time. Wait a few minutes between bites.  · Eat a diet low in fiber (called a low-residue diet). Foods allowed include refined breads, white rice, fruit and vegetable juices without pulp, tender meats. These foods will pass more easily through the intestine.  · Avoid whole-grain foods, whole fruits and vegetables, meats, seeds and nuts, fried or fatty foods, dairy, alcohol and spicy foods until your symptoms go away.  Follow-up care  Follow up with your healthcare provider, or as advised, if your pain does not begin to improve in the next 24 hours.  Call 911  Call 911 if any of these occur:  · Trouble breathing  · Confusion  · Fainting or loss of consciousness  · Rapid heart rate  · Seizure  When to seek medical advice  Call your healthcare provider right away if any of these occur:  · Pain gets worse or moves to the right lower abdomen  · New or worsening vomiting or diarrhea  · Swelling of the abdomen  · Unable to pass stool for more than 3 days  · Fever of 100.4ºF (38ºC) or higher, or as directed by your healthcare provider.  · Blood in vomit or bowel movements (dark red or black color)  · Jaundice (yellow color of eyes and skin)  · Weakness, dizziness  · Chest, arm, back, neck or jaw pain  · Unexpected vaginal bleeding or missed period  · Can't keep down liquids or water and are getting dehydrated  Date Last Reviewed: 12/30/2015  © 1849-9560 [employer]. [address] [place] [address]. All rights reserved. This information is not intended as a substitute for professional medical care. Always follow your healthcare professional's instructions.        Clear Liquid Diet    Clear liquids are any liquid that you can see through. They are also very easy to digest. You may be put on a clear liquid diet if you are recovering from irritation or infection of the stomach or intestinal tract. This diet may also be used before  surgery or special procedures such as a colonoscopy. You should not be on this diet for more than 3 days. Below are some clear liquids you can have on this diet.  Adults and children over 2 years old  Adults should drink a total of 2 to 3 quarts of liquid per day. It may be easier to drink small frequent servings rather than a few large ones. Liquids can include:  · Fruit juices. Strained orange juice or lemonade (no pulp); apple, grape, and cranberry juice; clear fruit drinks  · Beverages. Sport drinks, sodas, mineral water (plain or flavored), tea, black coffee, liquid gelatin (add twice the recommended amount of water)  · Soups. Clear broth  · Desserts. Plain gelatin, popsicles, fruit juice bars  Children under 2 years old  Oral rehydration fluids are available at drug stores and most grocery stores. You dont need a prescription.  Date Last Reviewed: 8/1/2016  © 6969-9539 [employer]. [address] [place] [address], [place] [address]. All rights reserved. This information is not intended as a substitute for professional medical care. Always follow your healthcare professional's instructions.

## 2018-08-21 ENCOUNTER — INITIAL CONSULT (OUTPATIENT)
Dept: GASTROENTEROLOGY | Facility: CLINIC | Age: 48
End: 2018-08-21
Payer: COMMERCIAL

## 2018-08-21 ENCOUNTER — TELEPHONE (OUTPATIENT)
Dept: INTERNAL MEDICINE | Facility: CLINIC | Age: 48
End: 2018-08-21

## 2018-08-21 VITALS
HEIGHT: 60 IN | WEIGHT: 173.94 LBS | DIASTOLIC BLOOD PRESSURE: 112 MMHG | SYSTOLIC BLOOD PRESSURE: 144 MMHG | BODY MASS INDEX: 34.15 KG/M2 | HEART RATE: 78 BPM

## 2018-08-21 DIAGNOSIS — K58.1 IRRITABLE BOWEL SYNDROME WITH CONSTIPATION: Primary | Chronic | ICD-10-CM

## 2018-08-21 DIAGNOSIS — R10.30 LOWER ABDOMINAL PAIN: ICD-10-CM

## 2018-08-21 PROBLEM — E87.6 HYPOKALEMIA: Chronic | Status: ACTIVE | Noted: 2018-08-21

## 2018-08-21 PROCEDURE — 3077F SYST BP >= 140 MM HG: CPT | Mod: CPTII,S$GLB,, | Performed by: NURSE PRACTITIONER

## 2018-08-21 PROCEDURE — 99203 OFFICE O/P NEW LOW 30 MIN: CPT | Mod: S$GLB,,, | Performed by: NURSE PRACTITIONER

## 2018-08-21 PROCEDURE — 3080F DIAST BP >= 90 MM HG: CPT | Mod: CPTII,S$GLB,, | Performed by: NURSE PRACTITIONER

## 2018-08-21 PROCEDURE — 99999 PR PBB SHADOW E&M-EST. PATIENT-LVL III: CPT | Mod: PBBFAC,,, | Performed by: NURSE PRACTITIONER

## 2018-08-21 PROCEDURE — 3008F BODY MASS INDEX DOCD: CPT | Mod: CPTII,S$GLB,, | Performed by: NURSE PRACTITIONER

## 2018-08-21 NOTE — TELEPHONE ENCOUNTER
"Spoke to pt about making a lab and f/u appt. Pt stated, " I will call back and make the appts later as I am currently at a doctor appt at this time.  "

## 2018-08-21 NOTE — LETTER
August 22, 2018      WILMAR Fontenot MD  [address] [facility] [address]           [facility]  [address] [place] [id number]  Phone: [phone]  Fax: [phone]          Patient: Irasema Vang   MR Number: [id number]   YOB: 1970   Date of Visit: 8/21/2018       Dear Dr. WILMAR Fontenot:    Thank you for referring Irasema Vang to me for evaluation. Attached you will find relevant portions of my assessment and plan of care.    If you have questions, please do not hesitate to call me. I look forward to following Irasema Vang along with you.    Sincerely,    Shy Camp, [facility]    Enclosure  CC:  No Recipients    If you would like to receive this communication electronically, please contact [email or web address] or [phone] to request more information on [employer] access.    For providers and/or their staff who would like to refer a patient to [facility], please contact us through our one-stop-shop provider referral line, [facility] , at [phone].    If you feel you have received this communication in error or would no longer like to receive these types of communications, please e-mail [email or web address]

## 2018-08-22 ENCOUNTER — PATIENT MESSAGE (OUTPATIENT)
Dept: INTERNAL MEDICINE | Facility: CLINIC | Age: 48
End: 2018-08-22

## 2018-08-22 DIAGNOSIS — E87.6 HYPOKALEMIA: Primary | ICD-10-CM

## 2018-08-22 NOTE — PROGRESS NOTES
Clinic Consult:  [facility] Consultation Note    Reason for Consult:  The primary encounter diagnosis was Irritable bowel syndrome with constipation. A diagnosis of Lower abdominal pain was also pertinent to this visit.    PC[facility]: Gladys Fernandez   [address] [facility] [address]    HPI:  This is a 47 y.o. female here for evaluation of the above  Pt was referred by her PCP for additional management of her constipation  Pt states that she has had issues with constipation for most of her life.  She has been well managed on Linzess once daily, however, over the recent weeks, she has had a return of the constipation and abdominal pain.  She denies any known exacerbating factors.  Denies any new medications or dietary changes.   She is now having a BM once weekly with difficulty to pass stools and the need for straining.   She denies any hematochezia.   Recent X-ray completed by PCP showed retained stool.  No obstructions.   Last colonoscopy 2013.     Review of Systems   Constitutional: Negative for chills, fever, malaise/fatigue and weight loss.   Respiratory: Negative for cough.    Cardiovascular: Negative for chest pain.   Gastrointestinal:        Per HPI   Musculoskeletal: Negative for myalgias.   Skin: Negative for itching and rash.   Neurological: Negative for headaches.   Psychiatric/Behavioral: The patient is not nervous/anxious.        Medical History:   Past Medical History:   Diagnosis Date    Abnormal Pap smear     history of uterine cancer    Abnormal Pap smear of cervix     Asthma     no meds since age 20    Constipation, chronic     Depression     Family history of nephrolithiasis     History of cervical dysplasia 6/27/2013    Hypertension     Menopausal syndrome (hot flashes) 6/27/2013    Nephrolithiasis 6/27/2013    Renal cell carcinoma     Seizures     1 yr ago- caused by migraine meds--no treatment now    Urinoma     Uterine cancer 2000       Surgical History:  Past  Surgical History:   Procedure Laterality Date    COLONOSCOPY  05/17/13    non-bleeding AVMs    COLONOSCOPY W/ POLYPECTOMY      cystoscopy with stent placement      ESOPHAGOGASTRODUODENOSCOPY  05/17/13    HYSTERECTOMY      Laparoscopy  09/05/2017    URETEROSCOPY         Family History:   Family History   Problem Relation Age of Onset    Cancer Maternal Grandmother     Diabetes Brother     Cancer Maternal Aunt     Breast cancer Maternal Aunt     Hypertension Maternal Aunt     Breast cancer Maternal Aunt     Hypertension Mother     Cancer Father     Colon cancer Neg Hx     Ovarian cancer Neg Hx        Social History:   Social History     Tobacco Use    Smoking status: Never Smoker    Smokeless tobacco: Never Used   Substance Use Topics    Alcohol use: Yes     Alcohol/week: 0.0 oz     Comment: occassionally     Drug use: No     Comment: Liquid Cannabis Oil       Allergies: Reviewed    Home Medications:   Current Outpatient Medications on File Prior to Visit   Medication Sig Dispense Refill    buPROPion (WELLBUTRIN XL) 150 MG TB24 tablet Take 3 tablets daily 90 tablet 1    hydroCHLOROthiazide (HYDRODIURIL) 25 MG tablet TAKE ONE TABLET BY MOUTH ONCE DAILY 30 tablet 6    LINZESS 290 mcg Cap TAKE ONE CAPSULE BY MOUTH ONCE DAILY 30 capsule 11    potassium chloride SA (K-DUR,KLOR-CON) 20 MEQ tablet Take 1 tablet (20 mEq total) by mouth 2 (two) times daily. (Patient taking differently: Take 20 mEq by mouth once daily. ) 60 tablet 3    zolpidem (AMBIEN) 5 MG Tab Take 1/2 to 1 tablet at bedtime as needed for sleep 30 tablet 2    busPIRone (BUSPAR) 30 MG Tab Take 1 tablet (30 mg total) by mouth 2 (two) times daily. 60 tablet 1    epinephrine (EPIPEN) 0.3 mg/0.3 mL AtIn Inject into the muscle as needed. 2 each 6    ibuprofen (ADVIL,MOTRIN) 800 MG tablet Take 1 tablet (800 mg total) by mouth every 6 (six) hours as needed for Pain. 30 tablet 1     No current facility-administered medications on file  prior to visit.        Physical Exam:  Vital Signs:  BP (!) 144/112   Pulse 78   Ht 5' (1.524 m)   Wt 78.9 kg (173 lb 15.1 oz)   LMP 01/05/2001   BMI 33.97 kg/m²   Body mass index is 33.97 kg/m².  Physical Exam   Constitutional: She is oriented to person, place, and time. She appears well-developed and well-nourished.   Eyes: No scleral icterus.   Neck: Normal range of motion.   Cardiovascular: Normal rate and regular rhythm.   Pulmonary/Chest: Effort normal and breath sounds normal.   Abdominal: Soft. Bowel sounds are normal. She exhibits no distension. There is tenderness (generalized).   Musculoskeletal: Normal range of motion.   Neurological: She is alert and oriented to person, place, and time.   Skin: Skin is warm and dry.   Psychiatric: She has a normal mood and affect.   Vitals reviewed.      Labs: Pertinent labs reviewed.    Assessment:  1. Irritable bowel syndrome with constipation    2. Lower abdominal pain         Recommendations:  - I suspect this is an acute exacerbation of her constipation   - Will plan for a miralax bowel prep followed by a return to her daily dose of Linzess.  - If the constipation returns, will change to Amitiza BID or Trulance.   - Increase water and dietary fiber    Irritable bowel syndrome with constipation    Lower abdominal pain          Follow-up in about 4 weeks (around 9/18/2018).        Thank you so much for allowing me to participate in the care of Irasema Vang    NORI Krishnamurthy

## 2018-08-30 ENCOUNTER — OFFICE VISIT (OUTPATIENT)
Dept: OBSTETRICS AND GYNECOLOGY | Facility: CLINIC | Age: 48
End: 2018-08-30
Payer: COMMERCIAL

## 2018-08-30 ENCOUNTER — TELEPHONE (OUTPATIENT)
Dept: INTERNAL MEDICINE | Facility: CLINIC | Age: 48
End: 2018-08-30

## 2018-08-30 VITALS
SYSTOLIC BLOOD PRESSURE: 120 MMHG | WEIGHT: 176.13 LBS | DIASTOLIC BLOOD PRESSURE: 72 MMHG | HEIGHT: 60 IN | BODY MASS INDEX: 34.58 KG/M2

## 2018-08-30 DIAGNOSIS — N94.10 DYSPAREUNIA, FEMALE: ICD-10-CM

## 2018-08-30 DIAGNOSIS — G89.29 CHRONIC PELVIC PAIN IN FEMALE: ICD-10-CM

## 2018-08-30 DIAGNOSIS — R10.2 CHRONIC PELVIC PAIN IN FEMALE: ICD-10-CM

## 2018-08-30 DIAGNOSIS — Z12.31 ENCOUNTER FOR SCREENING MAMMOGRAM FOR BREAST CANCER: ICD-10-CM

## 2018-08-30 DIAGNOSIS — Z11.3 SCREEN FOR STD (SEXUALLY TRANSMITTED DISEASE): ICD-10-CM

## 2018-08-30 DIAGNOSIS — N95.1 MENOPAUSAL SYMPTOMS: ICD-10-CM

## 2018-08-30 DIAGNOSIS — Z01.419 WELL WOMAN EXAM WITH ROUTINE GYNECOLOGICAL EXAM: Primary | ICD-10-CM

## 2018-08-30 DIAGNOSIS — N94.9 ADNEXAL CYST: ICD-10-CM

## 2018-08-30 PROCEDURE — 99999 PR PBB SHADOW E&M-EST. PATIENT-LVL III: CPT | Mod: PBBFAC,,, | Performed by: OBSTETRICS & GYNECOLOGY

## 2018-08-30 PROCEDURE — 3078F DIAST BP <80 MM HG: CPT | Mod: CPTII,S$GLB,, | Performed by: OBSTETRICS & GYNECOLOGY

## 2018-08-30 PROCEDURE — 87491 CHLMYD TRACH DNA AMP PROBE: CPT

## 2018-08-30 PROCEDURE — 99396 PREV VISIT EST AGE 40-64: CPT | Mod: S$GLB,,, | Performed by: OBSTETRICS & GYNECOLOGY

## 2018-08-30 PROCEDURE — 3074F SYST BP LT 130 MM HG: CPT | Mod: CPTII,S$GLB,, | Performed by: OBSTETRICS & GYNECOLOGY

## 2018-08-30 NOTE — TELEPHONE ENCOUNTER
----- Message from Varsha Onofre sent at 8/30/2018  2:56 PM CDT -----  Contact: Pt   States she missed her labs on yesterday and is calling to have the orders placed back in system to schedule. Pt is at the clinic now and can be reached at [phone]

## 2018-08-31 ENCOUNTER — HOSPITAL ENCOUNTER (OUTPATIENT)
Dept: PREADMISSION TESTING | Facility: HOSPITAL | Age: 48
Discharge: HOME | End: 2018-08-31
Attending: OBSTETRICS & GYNECOLOGY
Payer: COMMERCIAL

## 2018-08-31 ENCOUNTER — HOSPITAL ENCOUNTER (OUTPATIENT)
Dept: RADIOLOGY | Facility: HOSPITAL | Age: 48
Discharge: HOME | End: 2018-08-31
Attending: OBSTETRICS & GYNECOLOGY
Payer: COMMERCIAL

## 2018-08-31 ENCOUNTER — TELEPHONE (OUTPATIENT)
Dept: OBSTETRICS AND GYNECOLOGY | Facility: CLINIC | Age: 48
End: 2018-08-31

## 2018-08-31 VITALS — WEIGHT: 176 LBS | BODY MASS INDEX: 34.55 KG/M2 | HEIGHT: 60 IN

## 2018-08-31 VITALS — HEIGHT: 60 IN | BODY MASS INDEX: 34.16 KG/M2 | WEIGHT: 174 LBS

## 2018-08-31 DIAGNOSIS — N94.9 ADNEXAL CYST: Primary | ICD-10-CM

## 2018-08-31 DIAGNOSIS — Z12.31 ENCOUNTER FOR SCREENING MAMMOGRAM FOR BREAST CANCER: ICD-10-CM

## 2018-08-31 PROCEDURE — 77063 BREAST TOMOSYNTHESIS BI: CPT | Mod: TC,PO

## 2018-08-31 PROCEDURE — 77063 BREAST TOMOSYNTHESIS BI: CPT | Mod: 26,,, | Performed by: RADIOLOGY

## 2018-08-31 PROCEDURE — 77067 SCR MAMMO BI INCL CAD: CPT | Mod: 26,,, | Performed by: RADIOLOGY

## 2018-08-31 RX ORDER — BIOTIN 10 MG
TABLET ORAL
COMMUNITY
End: 2018-12-18

## 2018-08-31 NOTE — DISCHARGE INSTRUCTIONS
To confirm, Your doctor has instructed you that surgery is scheduled for 9/4/18 at  1:30 pm.       Please report to [facility], [facility], 1st floor, main lobby by 12:00 pm.      INSTRUCTIONS IMPORTANT!!!   Do not eat, drink, or smoke after 12 midnight May have water and clear liquid juice until 3 hrs prior to surgery.   OK to brush teeth, no gum, candy or mints!    ¨ Take only these medicines with a small swallow of water-morning of surgery.  None    Pre operative instructions:  Please review the Pre-Operative Instruction booklet that you were given.        Bathing Instructions--See page 6 in the Pre-operative booklet.      Prevention of surgical site infections:     -Keep incisions clean and dry.   -Do not soak/submerge incisions in water until completely healed.   -Do not apply lotions, powders, creams, or deodorants to site.   -Always make sure hands are cleaned with antibacterial soap/ alcohol-based                 prior to touching the surgical site.  (This includes doctors,                 nurses, staff, and yourself.)    Signs and symptoms:   -Redness and pain around the area where you had surgery   -Drainage of cloudy fluid from your surgical wound   -Fever over 100.4       I have read or had read and explained to me, and understand the above information.  Additional comments or instructions:  Received a copy of Pre-operative instructions booklet, FAQ surgical site infection sheet, and packets of hibiclens (if indicated).

## 2018-09-01 LAB
C TRACH DNA SPEC QL NAA+PROBE: NOT DETECTED
N GONORRHOEA DNA SPEC QL NAA+PROBE: NOT DETECTED

## 2018-09-04 ENCOUNTER — HOSPITAL ENCOUNTER (OUTPATIENT)
Facility: HOSPITAL | Age: 48
Discharge: HOME | End: 2018-09-04
Attending: OBSTETRICS & GYNECOLOGY | Admitting: OBSTETRICS & GYNECOLOGY
Payer: COMMERCIAL

## 2018-09-04 ENCOUNTER — ANESTHESIA (OUTPATIENT)
Dept: SURGERY | Facility: HOSPITAL | Age: 48
End: 2018-09-04
Payer: COMMERCIAL

## 2018-09-04 ENCOUNTER — TELEPHONE (OUTPATIENT)
Dept: OBSTETRICS AND GYNECOLOGY | Facility: CLINIC | Age: 48
End: 2018-09-04

## 2018-09-04 ENCOUNTER — ANESTHESIA EVENT (OUTPATIENT)
Dept: SURGERY | Facility: HOSPITAL | Age: 48
End: 2018-09-04
Payer: COMMERCIAL

## 2018-09-04 DIAGNOSIS — R10.2 PELVIC PAIN: ICD-10-CM

## 2018-09-04 DIAGNOSIS — R10.2 CHRONIC PELVIC PAIN IN FEMALE: Primary | ICD-10-CM

## 2018-09-04 DIAGNOSIS — G89.29 CHRONIC PELVIC PAIN IN FEMALE: Primary | ICD-10-CM

## 2018-09-04 PROCEDURE — 71000039 HC RECOVERY, EACH ADD'L HOUR: Performed by: OBSTETRICS & GYNECOLOGY

## 2018-09-04 PROCEDURE — 88307 TISSUE EXAM BY PATHOLOGIST: CPT | Mod: 26,,, | Performed by: PATHOLOGY

## 2018-09-04 PROCEDURE — 37000009 HC ANESTHESIA EA ADD 15 MINS: Performed by: OBSTETRICS & GYNECOLOGY

## 2018-09-04 PROCEDURE — 88305 TISSUE EXAM BY PATHOLOGIST: CPT | Mod: 26,,, | Performed by: PATHOLOGY

## 2018-09-04 PROCEDURE — 25000003 PHARM REV CODE 250: Performed by: NURSE ANESTHETIST, CERTIFIED REGISTERED

## 2018-09-04 PROCEDURE — 71000033 HC RECOVERY, INTIAL HOUR: Performed by: OBSTETRICS & GYNECOLOGY

## 2018-09-04 PROCEDURE — 63600175 PHARM REV CODE 636 W HCPCS: Performed by: NURSE ANESTHETIST, CERTIFIED REGISTERED

## 2018-09-04 PROCEDURE — 25000003 PHARM REV CODE 250: Performed by: OBSTETRICS & GYNECOLOGY

## 2018-09-04 PROCEDURE — 36000709 HC OR TIME LEV III EA ADD 15 MIN: Performed by: OBSTETRICS & GYNECOLOGY

## 2018-09-04 PROCEDURE — 36000708 HC OR TIME LEV III 1ST 15 MIN: Performed by: OBSTETRICS & GYNECOLOGY

## 2018-09-04 PROCEDURE — 37000008 HC ANESTHESIA 1ST 15 MINUTES: Performed by: OBSTETRICS & GYNECOLOGY

## 2018-09-04 PROCEDURE — 88305 TISSUE EXAM BY PATHOLOGIST: CPT | Performed by: PATHOLOGY

## 2018-09-04 PROCEDURE — 63600175 PHARM REV CODE 636 W HCPCS: Performed by: ANESTHESIOLOGY

## 2018-09-04 PROCEDURE — 71000015 HC POSTOP RECOV 1ST HR: Performed by: OBSTETRICS & GYNECOLOGY

## 2018-09-04 PROCEDURE — 27201423 OPTIME MED/SURG SUP & DEVICES STERILE SUPPLY: Performed by: OBSTETRICS & GYNECOLOGY

## 2018-09-04 PROCEDURE — 58661 LAPAROSCOPY REMOVE ADNEXA: CPT | Mod: 50,,, | Performed by: OBSTETRICS & GYNECOLOGY

## 2018-09-04 RX ORDER — MIDAZOLAM HYDROCHLORIDE 1 MG/ML
INJECTION, SOLUTION INTRAMUSCULAR; INTRAVENOUS
Status: DISCONTINUED | OUTPATIENT
Start: 2018-09-04 | End: 2018-09-04

## 2018-09-04 RX ORDER — PROPOFOL 10 MG/ML
VIAL (ML) INTRAVENOUS
Status: DISCONTINUED | OUTPATIENT
Start: 2018-09-04 | End: 2018-09-04

## 2018-09-04 RX ORDER — FENTANYL CITRATE 50 UG/ML
INJECTION, SOLUTION INTRAMUSCULAR; INTRAVENOUS
Status: DISCONTINUED | OUTPATIENT
Start: 2018-09-04 | End: 2018-09-04

## 2018-09-04 RX ORDER — OXYCODONE AND ACETAMINOPHEN 5; 325 MG/1; MG/1
1 TABLET ORAL EVERY 4 HOURS PRN
Qty: 15 TABLET | Refills: 0 | Status: SHIPPED | OUTPATIENT
Start: 2018-09-04 | End: 2018-10-05

## 2018-09-04 RX ORDER — ACETAMINOPHEN 10 MG/ML
1000 INJECTION, SOLUTION INTRAVENOUS ONCE
Status: COMPLETED | OUTPATIENT
Start: 2018-09-04 | End: 2018-09-04

## 2018-09-04 RX ORDER — LIDOCAINE HYDROCHLORIDE 10 MG/ML
INJECTION INFILTRATION; PERINEURAL
Status: DISCONTINUED | OUTPATIENT
Start: 2018-09-04 | End: 2018-09-04

## 2018-09-04 RX ORDER — DEXAMETHASONE SODIUM PHOSPHATE 4 MG/ML
INJECTION, SOLUTION INTRA-ARTICULAR; INTRALESIONAL; INTRAMUSCULAR; INTRAVENOUS; SOFT TISSUE
Status: DISCONTINUED | OUTPATIENT
Start: 2018-09-04 | End: 2018-09-04

## 2018-09-04 RX ORDER — SODIUM CHLORIDE 0.9 % (FLUSH) 0.9 %
3 SYRINGE (ML) INJECTION
Status: DISCONTINUED | OUTPATIENT
Start: 2018-09-04 | End: 2018-09-04 | Stop reason: HOSPADM

## 2018-09-04 RX ORDER — NEOSTIGMINE METHYLSULFATE 1 MG/ML
INJECTION, SOLUTION INTRAVENOUS
Status: DISCONTINUED | OUTPATIENT
Start: 2018-09-04 | End: 2018-09-04

## 2018-09-04 RX ORDER — PHENAZOPYRIDINE HYDROCHLORIDE 100 MG/1
200 TABLET, FILM COATED ORAL
Status: DISPENSED | OUTPATIENT
Start: 2018-09-04

## 2018-09-04 RX ORDER — ROCURONIUM BROMIDE 10 MG/ML
INJECTION, SOLUTION INTRAVENOUS
Status: DISCONTINUED | OUTPATIENT
Start: 2018-09-04 | End: 2018-09-04

## 2018-09-04 RX ORDER — MEPERIDINE HYDROCHLORIDE 50 MG/ML
12.5 INJECTION INTRAMUSCULAR; INTRAVENOUS; SUBCUTANEOUS ONCE AS NEEDED
Status: DISCONTINUED | OUTPATIENT
Start: 2018-09-04 | End: 2018-09-04 | Stop reason: HOSPADM

## 2018-09-04 RX ORDER — KETOROLAC TROMETHAMINE 30 MG/ML
INJECTION, SOLUTION INTRAMUSCULAR; INTRAVENOUS
Status: DISCONTINUED | OUTPATIENT
Start: 2018-09-04 | End: 2018-09-04

## 2018-09-04 RX ORDER — SODIUM CHLORIDE 0.9 % (FLUSH) 0.9 %
3 SYRINGE (ML) INJECTION EVERY 8 HOURS
Status: DISCONTINUED | OUTPATIENT
Start: 2018-09-04 | End: 2018-09-04 | Stop reason: HOSPADM

## 2018-09-04 RX ORDER — FENTANYL CITRATE 50 UG/ML
25 INJECTION, SOLUTION INTRAMUSCULAR; INTRAVENOUS EVERY 5 MIN PRN
Status: DISCONTINUED | OUTPATIENT
Start: 2018-09-04 | End: 2018-09-04 | Stop reason: HOSPADM

## 2018-09-04 RX ORDER — SODIUM CHLORIDE, SODIUM LACTATE, POTASSIUM CHLORIDE, CALCIUM CHLORIDE 600; 310; 30; 20 MG/100ML; MG/100ML; MG/100ML; MG/100ML
INJECTION, SOLUTION INTRAVENOUS CONTINUOUS
Status: DISCONTINUED | OUTPATIENT
Start: 2018-09-04 | End: 2023-10-31

## 2018-09-04 RX ORDER — ONDANSETRON 2 MG/ML
INJECTION INTRAMUSCULAR; INTRAVENOUS
Status: DISCONTINUED | OUTPATIENT
Start: 2018-09-04 | End: 2018-09-04

## 2018-09-04 RX ORDER — GLYCOPYRROLATE 0.2 MG/ML
INJECTION INTRAMUSCULAR; INTRAVENOUS
Status: DISCONTINUED | OUTPATIENT
Start: 2018-09-04 | End: 2018-09-04

## 2018-09-04 RX ADMIN — MIDAZOLAM 2 MG: 1 INJECTION INTRAMUSCULAR; INTRAVENOUS at 12:09

## 2018-09-04 RX ADMIN — FENTANYL CITRATE 25 MCG: 50 INJECTION INTRAMUSCULAR; INTRAVENOUS at 03:09

## 2018-09-04 RX ADMIN — FENTANYL CITRATE 25 MCG: 50 INJECTION INTRAMUSCULAR; INTRAVENOUS at 02:09

## 2018-09-04 RX ADMIN — DEXAMETHASONE SODIUM PHOSPHATE 4 MG: 4 INJECTION, SOLUTION INTRA-ARTICULAR; INTRALESIONAL; INTRAMUSCULAR; INTRAVENOUS; SOFT TISSUE at 01:09

## 2018-09-04 RX ADMIN — PHENAZOPYRIDINE 200 MG: 100 TABLET ORAL at 11:09

## 2018-09-04 RX ADMIN — ROBINUL 0.6 MG: 0.2 INJECTION INTRAMUSCULAR; INTRAVENOUS at 01:09

## 2018-09-04 RX ADMIN — NEOSTIGMINE METHYLSULFATE 5 MG: 1 INJECTION INTRAVENOUS at 01:09

## 2018-09-04 RX ADMIN — FENTANYL CITRATE 50 MCG: 50 INJECTION, SOLUTION INTRAMUSCULAR; INTRAVENOUS at 01:09

## 2018-09-04 RX ADMIN — PROPOFOL 150 MG: 10 INJECTION, EMULSION INTRAVENOUS at 12:09

## 2018-09-04 RX ADMIN — FENTANYL CITRATE 50 MCG: 50 INJECTION, SOLUTION INTRAMUSCULAR; INTRAVENOUS at 12:09

## 2018-09-04 RX ADMIN — KETOROLAC TROMETHAMINE 30 MG: 30 INJECTION, SOLUTION INTRAMUSCULAR; INTRAVENOUS at 01:09

## 2018-09-04 RX ADMIN — ONDANSETRON 4 MG: 2 INJECTION, SOLUTION INTRAMUSCULAR; INTRAVENOUS at 01:09

## 2018-09-04 RX ADMIN — ROCURONIUM BROMIDE 40 MG: 10 INJECTION, SOLUTION INTRAVENOUS at 12:09

## 2018-09-04 RX ADMIN — ACETAMINOPHEN 1000 MG: 10 INJECTION, SOLUTION INTRAVENOUS at 02:09

## 2018-09-04 RX ADMIN — FENTANYL CITRATE 100 MCG: 50 INJECTION, SOLUTION INTRAMUSCULAR; INTRAVENOUS at 12:09

## 2018-09-04 RX ADMIN — SODIUM CHLORIDE, SODIUM LACTATE, POTASSIUM CHLORIDE, AND CALCIUM CHLORIDE: 600; 310; 30; 20 INJECTION, SOLUTION INTRAVENOUS at 12:09

## 2018-09-04 RX ADMIN — SODIUM CHLORIDE, SODIUM LACTATE, POTASSIUM CHLORIDE, AND CALCIUM CHLORIDE: 600; 310; 30; 20 INJECTION, SOLUTION INTRAVENOUS at 01:09

## 2018-09-04 RX ADMIN — LIDOCAINE HYDROCHLORIDE 100 MG: 10 INJECTION, SOLUTION INFILTRATION; PERINEURAL at 12:09

## 2018-09-04 NOTE — INTERVAL H&P NOTE
The patient has been examined and the H&P has been reviewed:    I concur with the findings and no changes have occurred since H&P was written.    Anesthesia/Surgery risks, benefits and alternative options discussed and understood by patient/family.          Active Hospital Problems    Diagnosis  POA    Pelvic pain [R10.2]  Yes    Chronic pelvic pain in female [R10.2, G89.29]  Yes           Radiology images independently reviewed by me and radiology report reviewed:     US PELVIS COMP WITH TRANSVAG NON-OB (7/3/18)    COMPARISON: July 29, 2016.    FINDINGS: Uterus: Status post hysterectomy.    Right ovary: Size: 1.9 x 0.9 x 0.9 cm    Appearance: Anechoic tubular structure with the serpiginous appearance identified within the right adnexa abutting the ovary. Margins are slightly irregular and there is no internal blood flow. Appearance is suggestive of distended fallopian tube. Correlate clinically with follow-up as warranted. No adjacent free fluid.    Vascular flow: Normal.    Left ovary: Size: 1.4 x 1.0 x 1.2 cm    Appearance: Normal    Vascular Flow: Normal.    Free Fluid: None.    IMPRESSION: Status post hysterectomy. Unremarkable left ovary. Normal appearance of the right ovary with adjacent distended tubular structure most suggestive of dilated fallopian tube. See above.      Dyspareunia, female [N94.10]  Yes      Resolved Hospital Problems   No resolved problems to display.

## 2018-09-04 NOTE — DISCHARGE INSTRUCTIONS
General Information:    1. Do not drink alcoholic beverages including beer for 24 hours or as long as you are on pain medication..  2. Do not drive a motor vehicle, operate machinery or power tools, or signs legal papers for 24 hours or as long as you are on pain medication.   3. You may experience light-headedness, dizziness, and sleepiness following surgery. Please do not stay alone. A responsible adult should be with you for this 24 hour period.  4. Go home and rest.  5. Progress slowly to a normal diet unless instructed.  Otherwise, begin with liquids such as soft drinks, then soup and crackers working up to solid foods. Drink plenty of nonalcoholic fluids.  6. Certain anesthetics and pain medications produce nausea and vomiting in certain individuals. If nausea becomes a problem at home, call you doctor.  7. A nurse will be calling you sometime after surgery. Do not be alarmed. This is our way of finding out how you are doing.  8. Several times every hour while you are awake, take 2-3 deep breaths and cough. If you had stomach surgery hold a pillow or rolled towel firmly against your stomach before you cough. This will help with any pain the cough might cause.  9. Several times every hour while you are awake, pump and flex your feet 5-6 times and do foot circles. This will help prevent blood clots.  10. Call your doctor for severe pain, bleeding, fever, or signs or symptoms of infection (pain, swelling, redness, foul odor, drainage).  11. You can contact your doctor anytime by [phone] for the [facility] (at [facility]) or [phone] for the [facility] on [facility].   [email or web address] is another way to contact your doctor if you are an active participant online with [facility].  12. Continue Nozin provided at discharge twice daily for 7 days or until the incision is healed.  See pamphlet or box for instructions.

## 2018-09-04 NOTE — TRANSFER OF CARE
Anesthesia Transfer of Care Note    Patient: Irasema Vang    Procedure(s) Performed: Procedure(s) (LRB):  SALPINGO-OOPHORECTOMY, LAPAROSCOPIC (Left)  EXCISION, CYST, OVARY, LAPAROSCOPIC (Right)  OOPHORECTOMY, LAPAROSCOPIC (Left)    Patient location: PACU    Anesthesia Type: general    Transport from OR: Transported from OR on room air with adequate spontaneous ventilation    Post pain: adequate analgesia    Post assessment: no apparent anesthetic complications    Post vital signs: stable    Level of consciousness: awake    Nausea/Vomiting: no nausea/vomiting    Complications: none    Transfer of care protocol was followed      Last vitals:   Visit Vitals  /80 (BP Location: Right arm, Patient Position: Sitting)   Pulse 67   Temp 36.5 °C (97.7 °F) (Temporal)   Resp 18   Ht 5' (1.524 m)   Wt 77.7 kg (171 lb 6.5 oz)   LMP 01/05/2001   SpO2 100%   Breastfeeding? No   BMI 33.48 kg/m²

## 2018-09-04 NOTE — TELEPHONE ENCOUNTER
----- Message from Nelsy Rubio sent at 9/4/2018  3:01 PM CDT -----  [facility] ( [facility] ) is requesting the nurse contacts the pt regarding a post op apt.          Please call pt back at [phone]

## 2018-09-04 NOTE — H&P
[facility] -   Obstetrics & Gynecology  History & Physical    Patient Name: Irasema Vang  MRN: [id number]  Admission Date: (Not on file)  Primary Care Provider: Gladys Fernandez DO    Subjective:     Chief Complaint/Reason for Admission:  Pelvic pain and dyspareunia    History of Present Illness:  47 y.o. female  with bilateral pelvic pain and dyspareunia.  She has had prior hysterectomy and RSO.  Pelvic u/s shows cystic structure in area of right adnexa.  She desires laparoscopic LSO as well as removal of right ovarian cyst.          Obstetric History       T0      L0     SAB3   TAB0   Ectopic0   Multiple0   Live Births0       # Outcome Date GA Lbr Mauricio/2nd Weight Sex Delivery Anes PTL Lv   3 SAB            2 SAB            1 SAB                 Past Medical History:   Diagnosis Date    Abnormal Pap smear     history of uterine cancer    Abnormal Pap smear of cervix     Asthma     no meds since age 20    Constipation, chronic     Depression     Family history of nephrolithiasis     History of cervical dysplasia 2013    Hypertension     Menopausal syndrome (hot flashes) 2013    Nephrolithiasis 2013    PTSD (post-traumatic stress disorder)     Renal cell carcinoma     Seizures     1 yr ago- caused by migraine meds--no treatment now    Urinoma     Uterine cancer      Past Surgical History:   Procedure Laterality Date    COLONOSCOPY  13    non-bleeding AVMs    COLONOSCOPY W/ POLYPECTOMY      cystoscopy with stent placement      ESOPHAGOGASTRODUODENOSCOPY  13    HYSTERECTOMY      Laparoscopy  2017    OOPHORECTOMY      1 ovary removed    URETEROSCOPY         No medications prior to admission.       Review of patient's allergies indicates:   Allergen Reactions    Shellfish containing products     Codeine Itching    Latex, natural rubber Hives    Peanut         Family History     Problem Relation (Age of Onset)    Breast  cancer Paternal Aunt, Paternal Aunt, Paternal Aunt    Cancer Maternal Grandmother, Father    Diabetes Brother    Hypertension Mother    Ovarian cancer Paternal Aunt        Tobacco Use    Smoking status: Never Smoker    Smokeless tobacco: Never Used   Substance and Sexual Activity    Alcohol use: Yes     Alcohol/week: 0.0 oz     Comment: occassionally     Drug use: No     Comment: Liquid Cannabis Oil    Sexual activity: Not Currently     Partners: Male     Review of Systems   Constitutional: Negative for chills and fever.   Respiratory: Negative for cough and shortness of breath.    Cardiovascular: Negative for chest pain.   Gastrointestinal: Negative for abdominal pain, nausea and vomiting.   Genitourinary: Positive for pelvic pain. Negative for dysuria, menorrhagia and vaginal bleeding.      Objective:     Vital Signs (Most Recent):    Vital Signs (24h Range):           There is no height or weight on file to calculate BMI.    Patient's last menstrual period was 01/05/2001.    Physical Exam:   Constitutional: She is oriented to person, place, and time. She appears well-developed and well-nourished. No distress.    HENT:   Head: Normocephalic and atraumatic.     Neck: Neck supple.    Cardiovascular: Normal rate and regular rhythm.     Pulmonary/Chest: Effort normal.        Abdominal: Soft. She exhibits no distension. There is no tenderness.             Musculoskeletal: She exhibits no edema or tenderness.       Neurological: She is alert and oriented to person, place, and time.    Skin: Skin is warm and dry.    Psychiatric: She has a normal mood and affect.       Laboratory:  I have personallly reviewed all pertinent lab results from the last 24 hours.    Diagnostic Results:  Pelvic u/s:  EXAMINATION:  US PELVIS COMP WITH TRANSVAG NON-OB (XPD)    CLINICAL HISTORY:  Unspecified ovarian cyst, right side    TECHNIQUE:  Transabdominal sonography of the pelvis was performed, followed by transvaginal sonography to  better evaluate the uterus and ovaries.    COMPARISON:  July 29, 2016.    FINDINGS:  Uterus:    Status post hysterectomy.    Right ovary:    Size: 1.9 x 0.9 x 0.9 cm    Appearance: Anechoic tubular structure with the serpiginous appearance identified within the right adnexa abutting the ovary.  Margins are slightly irregular and there is no internal blood flow.  Appearance is suggestive of distended fallopian tube.  Correlate clinically with follow-up as warranted.  No adjacent free fluid.    Vascular flow: Normal.    Assessment/Plan:     Chronic Pelvic Pain:  Proceed with laparoscopy with LSO and right adnexal cyst removal, as planned.    Procedure was explained to the patient and procedure specific consent form reviewed with the patient and signed by her.  All questions were answered to the patient's satisfaction.  Patient seems to understand the procedure, as well as risks and benefits associated with it.  Will plan to proceed with surgery as planned.    Darby Monroy MD  Obstetrics & Gynecology  [facility] -

## 2018-09-04 NOTE — SUBJECTIVE & OBJECTIVE
Obstetric History       T0      L0     SAB3   TAB0   Ectopic0   Multiple0   Live Births0       # Outcome Date GA Lbr Mauricio/2nd Weight Sex Delivery Anes PTL Lv   3 SAB            2 SAB            1 SAB                 Past Medical History:   Diagnosis Date    Abnormal Pap smear     history of uterine cancer    Abnormal Pap smear of cervix     Asthma     no meds since age 20    Constipation, chronic     Depression     Family history of nephrolithiasis     History of cervical dysplasia 2013    Hypertension     Menopausal syndrome (hot flashes) 2013    Nephrolithiasis 2013    PTSD (post-traumatic stress disorder)     Renal cell carcinoma     Seizures     1 yr ago- caused by migraine meds--no treatment now    Urinoma     Uterine cancer      Past Surgical History:   Procedure Laterality Date    COLONOSCOPY  13    non-bleeding AVMs    COLONOSCOPY W/ POLYPECTOMY      cystoscopy with stent placement      ESOPHAGOGASTRODUODENOSCOPY  13    HYSTERECTOMY      Laparoscopy  2017    OOPHORECTOMY      1 ovary removed    URETEROSCOPY         No medications prior to admission.       Review of patient's allergies indicates:   Allergen Reactions    Shellfish containing products     Codeine Itching    Latex, natural rubber Hives    Peanut         Family History     Problem Relation (Age of Onset)    Breast cancer Paternal Aunt, Paternal Aunt, Paternal Aunt    Cancer Maternal Grandmother, Father    Diabetes Brother    Hypertension Mother    Ovarian cancer Paternal Aunt        Tobacco Use    Smoking status: Never Smoker    Smokeless tobacco: Never Used   Substance and Sexual Activity    Alcohol use: Yes     Alcohol/week: 0.0 oz     Comment: occassionally     Drug use: No     Comment: Liquid Cannabis Oil    Sexual activity: Not Currently     Partners: Male     Review of Systems   Constitutional: Negative for chills and fever.   Respiratory: Negative for  cough and shortness of breath.    Cardiovascular: Negative for chest pain.   Gastrointestinal: Negative for abdominal pain, nausea and vomiting.   Genitourinary: Positive for pelvic pain. Negative for dysuria, menorrhagia and vaginal bleeding.      Objective:     Vital Signs (Most Recent):    Vital Signs (24h Range):           There is no height or weight on file to calculate BMI.    Patient's last menstrual period was 01/05/2001.    Physical Exam:   Constitutional: She is oriented to person, place, and time. She appears well-developed and well-nourished. No distress.    HENT:   Head: Normocephalic and atraumatic.     Neck: Neck supple.    Cardiovascular: Normal rate and regular rhythm.     Pulmonary/Chest: Effort normal.        Abdominal: Soft. She exhibits no distension. There is no tenderness.             Musculoskeletal: She exhibits no edema or tenderness.       Neurological: She is alert and oriented to person, place, and time.    Skin: Skin is warm and dry.    Psychiatric: She has a normal mood and affect.       Laboratory:  I have personallly reviewed all pertinent lab results from the last 24 hours.    Diagnostic Results:  Pelvic u/s:  EXAMINATION:  US PELVIS COMP WITH TRANSVAG NON-OB (XPD)    CLINICAL HISTORY:  Unspecified ovarian cyst, right side    TECHNIQUE:  Transabdominal sonography of the pelvis was performed, followed by transvaginal sonography to better evaluate the uterus and ovaries.    COMPARISON:  July 29, 2016.    FINDINGS:  Uterus:    Status post hysterectomy.    Right ovary:    Size: 1.9 x 0.9 x 0.9 cm    Appearance: Anechoic tubular structure with the serpiginous appearance identified within the right adnexa abutting the ovary.  Margins are slightly irregular and there is no internal blood flow.  Appearance is suggestive of distended fallopian tube.  Correlate clinically with follow-up as warranted.  No adjacent free fluid.    Vascular flow: Normal.

## 2018-09-04 NOTE — PLAN OF CARE
Pt resting on stretcher, stating pain level is tolerable. Abd sites and araceli  Pad  Remain c/d/i. VSS. Will cont to monitor. See flow sheet for detailed assessment.

## 2018-09-04 NOTE — HPI
47 y.o. female  with right lower quadrant abdominal pain.  Pelvic u/s shows cystic structure in right tubal area.  Patient has had prior hysterectomy and RSO.  She desires LSO and removal of right adnexal cyst for definitive treatment.

## 2018-09-04 NOTE — PLAN OF CARE
While preparing pt for d/c to home, pt started c/o lower rt sided abd pain rating it 8/10 on pain scale. Will cont to monitor.

## 2018-09-04 NOTE — OP NOTE
OPERATIVE NOTE      DATE OF PROCEDURE:  9/4/2018    PREOPERATIVE DIAGNOSIS:    1.  Chronic Pelvic Pain  2.  Right adnexal cyst    POSTOPERATIVE DIAGNOSIS    1.  Chronic Pelvic Pain  2.  Right adnexal cyst, adhered to vaginal cuff  3.  Pelvic adhesions    OPERATIVE PROCEDURE:    1.  Laparoscopic left oophorectomy  2.  Resection of right adnexal cyst    SURGEON:  Darby Monroy MD    ASSISTANT:  Patrica Rudd CST    ANESTHESIA:  General    ESTIMATED BLOOD LOSS:  75 ml    FINDINGS:  Left ovary adhered to bowel, right adnexal cyst, adhered to vaginal cuff.  Adhesions from bowel to pelvic sidewall and vaginal cuff.    COMPLICATIONS:  None    SPECIMENS TO PATHOLOGY:  Left ovary, right adnexal mass    PROCEDURE IN DETAIL:   The procedure was explained to the patient and informed consent was obtained. The patient was brought to the operating room where general anesthesia was administered. A garcia catheter was placed, as well as a Zumi uterine manipulator, and she was prepped and draped in the usual sterile manner. A time out was performed. While tenting up on the abdominal skin with towel clips, a Veress needle was placed through the umbilicus into the peritoneal cavity. A saline drop test was noted to be normal. The abdomen was insufflated with carbon dioxide to reach a pressure of 15 mm of Hg. A small intraumbilical stab incision was then made with the scalpel and a 5 mm trocar and sleeve were placed without difficulty. Laparoscopic confirmation of location was achieved. The upper abdomen was visualized and appeared normal. The pelvis was visualized with the findings noted above. A right 5 mm trocar as well as a suprapubic 11 mm trocar were also placed under direct visualization.    The left ovary was placed under tension with a grasper.  The adhesions from the ovary to the bowel were removed using the harmonic scalpel.  The harmonic scalpel was used to transect the infundibulopelvic ligament.  The harmonic scalpel was  then used to completely free the ovary.  An endocatch bag was then used to remove the ovary through the suprapubic incision.  This was sent to pathology.    At this time, the right adnexal cyst was grasped and placed under tension.  The harmonic scalpel was used to resect the cyst.  Clear fluid spilled from the cyst during dissection.  The cyst was removed through the suprapubic port.  The pelvis was well irrigated with good hemostasis noted.  No other pelvic abnormalities were noted.  The appendix appeared normal.  Both ureters appeared to be peristalsing normally.  Interceed was placed over the vaginal cuff in the area of cyst dissection.     At this time the procedure was concluded, as no other abnormalities were noted in the pelvis. All ports were removed under direct visualization and carbon dioxide was allowed to escape from the abdomen.  The fascia of the suprapubic incision was closed with a 0-Vicryl interrupted suture.  The skin incisions were closed with 4-0 vicryl in a subcuticular fashion. Dermoplast skin adhesive was placed over the incisions. The patient tolerated the procedure well and was taken to the recovery room in stable condition. All counts were correct x 3.

## 2018-09-04 NOTE — PROGRESS NOTES
HPI:  47 y.o. female patient  presents today for annual exam.  She has had a hysterectomy and RSO.  Left ovary is remaining.  She is c/o pelvic pain intermittently over past several months.  Pain is mainly on right side, but does occur on left also.  Pain is severe at times.  She is also c/o hot flashes and emotional swings over recent months.    Pelvic u/s in July shows cystic structure in right adnexal area.  Normal left ovary.      Review of Systems:    Constitutional:  Negative for fatigue and unexpected weight change  Breasts:  Negative for masses, tenderness, or nipple discharge  Gastrointestinal:  Negative for nausea, positive for abdominal pain, and abdominal distension  Genitourinary:  Negative for dysuria, frequency, or urgency  Gyn:  Negative for vaginal bleeding or vaginal discharge         Physical Exam:    Constitutional:  She appears well developed and well nourished.  No distress.  Neck:  Supple.  No thyromegaly present.  Breasts:  Symmetrical.  No skin changes or visible lesions.  No palpable masses, nipple discharge, or lymphadenopathy bilaterally.    Abdominal:  Soft, no distension.  No tenderness.  No masses  Pelvic:  Vulva:  No lesions.  Normal female genitalia  Urethral Meatus:  Normal size and location.  No lesions.  No prolapse.  Urethra:  No masses, tenderness, prolapse, or scarring  Vagina:  No lesions or discharge.  No significant cystocoele or rectocoele.  Cervix:  Surgically absent  Uterus:  Surgically absent  Adnexa:  No masses, tenderness present bilaterally.  Anus and Perineum:  No lesions.  No external hemorrhoids      ASSESSMENT:  1. Well woman exam with routine gynecological exam     2. Screen for STD (sexually transmitted disease)  HIV-1 and HIV-2 antibodies    RPR    Hepatitis B surface antigen    C. trachomatis/N. gonorrhoeae by AMP DNA [facility]; Vagina   3. Menopausal symptoms  TSH    Follicle stimulating hormone   4. Adnexal cyst     5. Encounter for screening mammogram  for breast cancer  CANCELED: Mammo Digital Screening Bilat with CAD   6. Dyspareunia, female     7. Chronic pelvic pain in female     :      PLAN:  Discussed treatment options with patient.  Due to bilateral pelvic pain, she desires laparoscopy for removal of right adnexal cyst and removal of left ovary.  She understands this will cause menopause.  Will check FSH level.  Will schedule patient for laparoscopic removal of right adnexal cyst and left ovary.  Patient requests STD testing - testing ordered.  MMG ordered.  Patient counseled regarding recommended routine health maintenance for her age.

## 2018-09-04 NOTE — ANESTHESIA PREPROCEDURE EVALUATION
09/04/2018  Irasema Vang is a 47 y.o., female.    Anesthesia Evaluation    I have reviewed the Patient Summary Reports.    I have reviewed the Nursing Notes.      Review of Systems  Anesthesia Hx:  No problems with previous Anesthesia Denies Hx of Anesthetic complications  Denies Family Hx of Anesthesia complications.   Denies Personal Hx of Anesthesia complications.   Cardiovascular:   Hypertension    Pulmonary:   Asthma    Renal/:   Chronic Renal Disease    Hepatic/GI:   Hx Renal cell carcinoma ?   OB/GYN/PEDS:  Pelvic pain, dsypareunia.     Neurological:   CVA Seizures Possible CVA in 2013. No residual weakness.     Psych:   Psychiatric History depression Insomnia.          Physical Exam  General:  Obesity    Airway/Jaw/Neck:  Airway Findings: Mouth Opening: Normal General Airway Assessment: Adult  Mallampati: II      Dental:  Dental Findings:    Chest/Lungs:  Chest/Lungs Findings: Clear to auscultation, Normal Respiratory Rate     Heart/Vascular:  Heart Findings: Rate: Normal  Rhythm: Regular Rhythm        Mental Status:  Mental Status Findings:  Cooperative, Alert and Oriented         Anesthesia Plan  Type of Anesthesia, risks & benefits discussed:  Anesthesia Type:  general  Patient's Preference:   Intra-op Monitoring Plan: standard ASA monitors  Intra-op Monitoring Plan Comments:   Post Op Pain Control Plan: IV/PO Opioids PRN  Post Op Pain Control Plan Comments:   Induction:   IV  Beta Blocker:  Patient is not currently on a Beta-Blocker (No further documentation required).       Informed Consent: Patient understands risks and agrees with Anesthesia plan.  Questions answered. Anesthesia consent signed with patient.  ASA Score: 2     Day of Surgery Review of History & Physical: I have interviewed and examined the patient. I have reviewed the patient's H&P dated:            Ready For Surgery From  Anesthesia Perspective.

## 2018-09-05 ENCOUNTER — TELEPHONE (OUTPATIENT)
Dept: OBSTETRICS AND GYNECOLOGY | Facility: CLINIC | Age: 48
End: 2018-09-05

## 2018-09-05 NOTE — ANESTHESIA POSTPROCEDURE EVALUATION
Anesthesia Post Evaluation    Patient: Irasema Vang    Procedure(s) Performed: Procedure(s) (LRB):  SALPINGO-OOPHORECTOMY, LAPAROSCOPIC (Left)  EXCISION, CYST, OVARY, LAPAROSCOPIC (Right)  OOPHORECTOMY, LAPAROSCOPIC (Left)    Final Anesthesia Type: general  Patient location during evaluation: PACU  Patient participation: Yes- Able to Participate  Level of consciousness: awake and alert and oriented  Post-procedure vital signs: reviewed and stable  Pain management: adequate  Airway patency: patent  PONV status at discharge: No PONV  Anesthetic complications: no      Cardiovascular status: hemodynamically stable  Respiratory status: unassisted, room air and spontaneous ventilation  Hydration status: euvolemic  Follow-up not needed.        Visit Vitals  /78   Pulse 62   Temp 36.5 °C (97.7 °F) (Axillary)   Resp 14   Ht 5' (1.524 m)   Wt 77.7 kg (171 lb 6.5 oz)   LMP 01/05/2001   SpO2 95%   Breastfeeding? No   BMI 33.48 kg/m²       Pain/Rasheed Score: Pain Assessment Performed: Yes (9/4/2018  3:30 PM)  Presence of Pain: complains of pain/discomfort (9/4/2018  3:30 PM)  Pain Rating Prior to Med Admin: 8 (9/4/2018  3:00 PM)  Rasheed Score: 10 (9/4/2018  3:30 PM)

## 2018-09-05 NOTE — TELEPHONE ENCOUNTER
----- Message from Krysta Koch sent at 9/5/2018  1:36 PM CDT -----  Contact: self   Patient would like to schedule post op appointment.patient was unsure of how many days/weeks to schedule. Please call back at [phone].    Thanks,  Krysta Koch

## 2018-09-06 ENCOUNTER — TELEPHONE (OUTPATIENT)
Dept: OBSTETRICS AND GYNECOLOGY | Facility: CLINIC | Age: 48
End: 2018-09-06

## 2018-09-06 VITALS
SYSTOLIC BLOOD PRESSURE: 127 MMHG | DIASTOLIC BLOOD PRESSURE: 78 MMHG | BODY MASS INDEX: 33.66 KG/M2 | RESPIRATION RATE: 14 BRPM | TEMPERATURE: 98 F | HEIGHT: 60 IN | WEIGHT: 171.44 LBS | HEART RATE: 62 BPM | OXYGEN SATURATION: 95 %

## 2018-09-06 NOTE — TELEPHONE ENCOUNTER
----- Message from Jahaira Hilario sent at 9/6/2018 10:36 AM CDT -----  Contact: [employer] Provider/Ana  States she's calling regarding her procedure date, time and the restrictions she has. She needs to know what date she was taken out of work. Please call Ana at [phone], ext [phone]. Thank you

## 2018-09-07 NOTE — DISCHARGE SUMMARY
[facility] -   Obstetrics & Gynecology  Discharge Summary    Patient Name: Irasema Vang  MRN: [id number]  Admission Date: 2018  Hospital Length of Stay: 0 days  Discharge Date and Time: 2018  4:09 PM  Attending Physician: No att. providers found   Discharging Provider: Darby Monroy MD  Primary Care Provider: Gladys Fernandez DO    HPI:  47 y.o. female  with right lower quadrant abdominal pain.  Pelvic u/s shows cystic structure in right tubal area.  Patient has had prior hysterectomy and RSO.  She desires LSO and removal of right adnexal cyst for definitive treatment.      Hospital Course:  No notes on file    Procedure(s) (LRB):  OOPHORECTOMY, LAPAROSCOPIC (Left)  EXCISION, CYST, OVARY, LAPAROSCOPIC (Right)  LYSIS, ADHESIONS, LAPAROSCOPIC (N/A)         Significant Diagnostic Studies: Labs: All labs within the past 24 hours have been reviewed    Pending Diagnostic Studies:     None        Final Active Diagnoses:    Diagnosis Date Noted POA    PRINCIPAL PROBLEM:  Pelvic pain [R10.2] 2018 Yes    Chronic pelvic pain in female [R10.2, G89.29] 2017 Yes    Dyspareunia, female [N94.10] 2017 Yes      Problems Resolved During this Admission:        Discharged Condition: stable    Disposition: Home or Self Care    Follow Up:    Patient Instructions:      Other restrictions (specify):   Order Comments: Pelvic rest x 2 weeks     Call MD for:  temperature >100.4     Call MD for:  persistent nausea and vomiting or diarrhea     Call MD for:  severe uncontrolled pain     Call MD for:  redness, tenderness, or signs of infection (pain, swelling, redness, odor or green/yellow discharge around incision site)     Call MD for:  difficulty breathing or increased cough     Call MD for:  persistent dizziness, light-headedness, or visual disturbances     No dressing needed     Medications:  Reconciled Home Medications:      Medication List      START taking these medications     oxyCODONE-acetaminophen 5-325 mg per tablet  Commonly known as:  PERCOCET  Take 1 tablet by mouth every 4 (four) hours as needed for Pain.        CHANGE how you take these medications    potassium chloride SA 20 MEQ tablet  Commonly known as:  K-DURKLOR-CON  Take 1 tablet (20 mEq total) by mouth 2 (two) times daily.  What changed:  when to take this        CONTINUE taking these medications    biotin 10 mg Tab  Take by mouth.     buPROPion 150 MG TB24 tablet  Commonly known as:  WELLBUTRIN XL  Take 3 tablets daily     busPIRone 30 MG Tab  Commonly known as:  BUSPAR  Take 1 tablet (30 mg total) by mouth 2 (two) times daily.     EPINEPHrine 0.3 mg/0.3 mL Atin  Commonly known as:  EPIPEN  Inject into the muscle as needed.     hydroCHLOROthiazide 25 MG tablet  Commonly known as:  HYDRODIURIL  TAKE ONE TABLET BY MOUTH ONCE DAILY     ibuprofen 800 MG tablet  Commonly known as:  ADVIL,MOTRIN  Take 1 tablet (800 mg total) by mouth every 6 (six) hours as needed for Pain.     LINZESS 290 mcg Cap  Generic drug:  linaclotide  TAKE ONE CAPSULE BY MOUTH ONCE DAILY     zolpidem 5 MG Tab  Commonly known as:  AMBIEN  Take 1/2 to 1 tablet at bedtime as needed for sleep            Darby Monroy MD  Obstetrics & Gynecology  [facility] -

## 2018-09-12 ENCOUNTER — TELEPHONE (OUTPATIENT)
Dept: OBSTETRICS AND GYNECOLOGY | Facility: CLINIC | Age: 48
End: 2018-09-12

## 2018-09-12 NOTE — TELEPHONE ENCOUNTER
----- Message from Irasema Romeo sent at 9/12/2018  1:51 PM CDT -----  Contact: pt  Please call pt @[phone] , pt states one of her stitches came lose, pt need to know what to do, pt had surgery last Tuesday.

## 2018-09-12 NOTE — TELEPHONE ENCOUNTER
Patient had surgery 9/4 and stitch to super pubic area popped.  Patient denies any bleeding, pain, drainage or fever.  Notified to keep area clean and dry and to call if any fever or drainage to area.

## 2018-09-14 ENCOUNTER — PATIENT MESSAGE (OUTPATIENT)
Dept: OBSTETRICS AND GYNECOLOGY | Facility: HOSPITAL | Age: 48
End: 2018-09-14

## 2018-09-14 ENCOUNTER — TELEPHONE (OUTPATIENT)
Dept: OBSTETRICS AND GYNECOLOGY | Facility: CLINIC | Age: 48
End: 2018-09-14

## 2018-09-14 NOTE — TELEPHONE ENCOUNTER
"Spoke with pt, pt states that her incision became open, and she believes it from "doing too much". Pt stated that she followed up last night with Urgent Care, and dressed the site with gauzes. Pt states that today, there is a little amount of bright red drainage. After speaking with Dr. Monroy, informed pt that she can continue to apply gauzes to the site, or a bandage. Also, apply Neosporin to the site to keep down infection. Advised to f/u with us next week if she experiences redness around the site, and/or fever. Pt voiced understanding.   "

## 2018-09-14 NOTE — TELEPHONE ENCOUNTER
----- Message from Darby Monroy MD sent at 9/14/2018 12:21 PM CDT -----  Contact: self [phone]      ----- Message -----  From: Ashley Mena  Sent: 9/14/2018  12:05 PM  To: Darby Monroy MD     Would like consult with nurse regarding issue with post-op bleeding.  Please call back at [phone].  Md Latonia

## 2018-09-14 NOTE — TELEPHONE ENCOUNTER
----- Message from George Ojeda sent at 9/14/2018  1:17 PM CDT -----  Contact: Irasema [phone]  Patient was returning a call

## 2018-09-18 ENCOUNTER — OFFICE VISIT (OUTPATIENT)
Dept: GASTROENTEROLOGY | Facility: CLINIC | Age: 48
End: 2018-09-18
Payer: COMMERCIAL

## 2018-09-18 VITALS
BODY MASS INDEX: 33.5 KG/M2 | HEART RATE: 90 BPM | WEIGHT: 171.5 LBS | SYSTOLIC BLOOD PRESSURE: 121 MMHG | DIASTOLIC BLOOD PRESSURE: 82 MMHG

## 2018-09-18 DIAGNOSIS — K59.04 CHRONIC IDIOPATHIC CONSTIPATION: Primary | ICD-10-CM

## 2018-09-18 DIAGNOSIS — K21.9 GASTROESOPHAGEAL REFLUX DISEASE, ESOPHAGITIS PRESENCE NOT SPECIFIED: ICD-10-CM

## 2018-09-18 PROCEDURE — 3079F DIAST BP 80-89 MM HG: CPT | Mod: CPTII,S$GLB,, | Performed by: NURSE PRACTITIONER

## 2018-09-18 PROCEDURE — 99214 OFFICE O/P EST MOD 30 MIN: CPT | Mod: S$GLB,,, | Performed by: NURSE PRACTITIONER

## 2018-09-18 PROCEDURE — 3008F BODY MASS INDEX DOCD: CPT | Mod: CPTII,S$GLB,, | Performed by: NURSE PRACTITIONER

## 2018-09-18 PROCEDURE — 3074F SYST BP LT 130 MM HG: CPT | Mod: CPTII,S$GLB,, | Performed by: NURSE PRACTITIONER

## 2018-09-18 PROCEDURE — 99999 PR PBB SHADOW E&M-EST. PATIENT-LVL III: CPT | Mod: PBBFAC,,, | Performed by: NURSE PRACTITIONER

## 2018-09-18 RX ORDER — LUBIPROSTONE 24 UG/1
24 CAPSULE ORAL 2 TIMES DAILY WITH MEALS
Qty: 60 CAPSULE | Refills: 2 | Status: SHIPPED | OUTPATIENT
Start: 2018-09-18 | End: 2018-12-18 | Stop reason: SDUPTHER

## 2018-09-18 RX ORDER — OMEPRAZOLE 40 MG/1
40 CAPSULE, DELAYED RELEASE ORAL DAILY
Qty: 30 CAPSULE | Refills: 11 | Status: SHIPPED | OUTPATIENT
Start: 2018-09-18 | End: 2023-09-14 | Stop reason: ALTCHOICE

## 2018-09-18 NOTE — PROGRESS NOTES
Clinic Follow Up:  [facility] Follow Up Note    Reason for Follow Up:  The primary encounter diagnosis was Chronic idiopathic constipation. A diagnosis of Gastroesophageal reflux disease, esophagitis presence not specified was also pertinent to this visit.    PCP: Gladys Fernandez       HPI:  This is a 47 y.o. female here for follow up of the above  Pt states that since her last visit, she has had no significant improvement in her constipation of GERD.   She has had recent GYN surgery and has been on narcotics for pain.  Has had an increase in constipation with that, however, the linzess had not significantly improved the constipation prior to the surgery.   She has had an increase in reflux with the worsening constipation.  Not currently on PPI.   No melena or hematochezia.       Review of Systems   Constitutional: Negative for chills, fever, malaise/fatigue and weight loss.   Respiratory: Negative for cough.    Cardiovascular: Negative for chest pain.   Gastrointestinal:        Per HPI   Musculoskeletal: Negative for myalgias.   Skin: Negative for itching and rash.   Neurological: Negative for headaches.   Psychiatric/Behavioral: The patient is not nervous/anxious.        Medical History:  Past Medical History:   Diagnosis Date    Abnormal Pap smear     history of uterine cancer    Abnormal Pap smear of cervix     Asthma     no meds since age 20    Constipation, chronic     Depression     Family history of nephrolithiasis     General anesthetics causing adverse effect in therapeutic use     slow to wake up    History of cervical dysplasia 6/27/2013    Hypertension     Menopausal syndrome (hot flashes) 6/27/2013    Nephrolithiasis 6/27/2013    PONV (postoperative nausea and vomiting)     PTSD (post-traumatic stress disorder)     Renal cell carcinoma     Seizures     1 yr ago- caused by migraine meds--no treatment now    Urinoma     Uterine cancer 2000       Surgical History:    Past Surgical History:   Procedure Laterality Date    COLONOSCOPY  05/17/13    non-bleeding AVMs    COLONOSCOPY W/ POLYPECTOMY      CYSTOSCOPY N/A 9/23/2013    [facility]    cystoscopy with stent placement      CYSTOSCOPY, WITH RETROGRADE PYELOGRAM AND URETERAL STENT INSERTION  7/24/2013    [facility]    ESOPHAGOGASTRODUODENOSCOPY  05/17/13    EXCISION, CYST, OVARY, LAPAROSCOPIC Right 9/4/2018    [facility]    HYSTERECTOMY      LAPAROSCOPIC LYSIS OF ADHESIONS N/A 9/4/2018    Procedure: LYSIS, ADHESIONS, LAPAROSCOPIC;  Surgeon: Darby Monroy MD;  Location: [facility];  Service: OB/GYN;  Laterality: N/A;    LAPAROSCOPIC OOPHORECTOMY Left 9/4/2018    Procedure: OOPHORECTOMY, LAPAROSCOPIC;  Surgeon: Darby Monroy MD;  Location: [facility];  Service: OB/GYN;  Laterality: Left;    LAPAROSCOPIC SURGICAL REMOVAL OF CYST OF OVARY Right 9/4/2018    Procedure: EXCISION, CYST, OVARY, LAPAROSCOPIC;  Surgeon: Darby Monroy MD;  Location: [facility];  Service: OB/GYN;  Laterality: Right;    Laparoscopy  09/05/2017    LAPAROSCOPY-DIAGNOSTIC N/A 9/5/2017    [facility]    LYSIS, ADHESIONS, LAPAROSCOPIC N/A 9/4/2018    [facility]    [id number]  9/5/2017    [facility]    OOPHORECTOMY      1 ovary removed    OOPHORECTOMY, LAPAROSCOPIC Left 9/4/2018    [facility]    REMOVAL, STENT, URETER Left 9/23/2013    [facility]    SALPINGECTOMY-LAPAROSCOPIC Left 9/5/2017    [facility]    [id number] Right 9/5/2017    [facility]    URETEROSCOPY         Family History:   Family History   Problem  Relation Age of Onset    Cancer Maternal Grandmother     Diabetes Brother     Hypertension Mother     Cancer Father     Breast cancer Paternal Aunt     Ovarian cancer Paternal Aunt     Breast cancer Paternal Aunt     Breast cancer Paternal Aunt     Colon cancer Neg Hx        Social History:   Social History     Tobacco Use    Smoking status: Never Smoker    Smokeless tobacco: Never Used   Substance Use Topics    Alcohol use: Yes     Alcohol/week: 0.0 oz     Comment: occassionally     Drug use: No     Comment: Liquid Cannabis Oil       Allergies: Reviewed    Home Medications:  Current Outpatient Medications on File Prior to Visit   Medication Sig Dispense Refill    biotin 10 mg Tab Take by mouth.      buPROPion (WELLBUTRIN XL) 150 MG TB24 tablet Take 3 tablets daily 90 tablet 1    epinephrine (EPIPEN) 0.3 mg/0.3 mL AtIn Inject into the muscle as needed. 2 each 6    hydroCHLOROthiazide (HYDRODIURIL) 25 MG tablet TAKE ONE TABLET BY MOUTH ONCE DAILY 30 tablet 6    ibuprofen (ADVIL,MOTRIN) 800 MG tablet Take 1 tablet (800 mg total) by mouth every 6 (six) hours as needed for Pain. 30 tablet 1    oxyCODONE-acetaminophen (PERCOCET) 5-325 mg per tablet Take 1 tablet by mouth every 4 (four) hours as needed for Pain. 15 tablet 0    potassium chloride SA (K-DUR,KLOR-CON) 20 MEQ tablet Take 1 tablet (20 mEq total) by mouth 2 (two) times daily. (Patient taking differently: Take 20 mEq by mouth once daily. ) 60 tablet 3    zolpidem (AMBIEN) 5 MG Tab Take 1/2 to 1 tablet at bedtime as needed for sleep 30 tablet 2    [DISCONTINUED] LINZESS 290 mcg Cap TAKE ONE CAPSULE BY MOUTH ONCE DAILY 30 capsule 11    [DISCONTINUED] busPIRone (BUSPAR) 30 MG Tab Take 1 tablet (30 mg total) by mouth 2 (two) times daily. 60 tablet 1     Current Facility-Administered Medications on File Prior to Visit   Medication Dose Route Frequency Provider Last Rate Last Dose    lactated ringers infusion   Intravenous Continuous Darby ARMENDARIZ  Anthony Monroy MD   Stopped at 09/04/18 1347    nozaseptin (NOZIN) nasal    Each Nare On Call Procedure Darby Monroy MD        phenazopyridine tablet 200 mg  200 mg Oral On Call Procedure Darby Monroy MD   200 mg at 09/04/18 1108       Physical Exam:  Vital Signs:  /82   Pulse 90   Wt 77.8 kg (171 lb 8.3 oz)   LMP 01/05/2001   BMI 33.50 kg/m²   Body mass index is 33.5 kg/m².  Physical Exam   Constitutional: She is oriented to person, place, and time. She appears well-developed and well-nourished.   HENT:   Head: Normocephalic.   Eyes: No scleral icterus.   Neck: Normal range of motion.   Cardiovascular: Normal rate and regular rhythm.   Pulmonary/Chest: Effort normal and breath sounds normal.   Abdominal: Soft. Bowel sounds are normal. She exhibits no distension. There is tenderness (Left side related to recetn GYN surgery).   Musculoskeletal: Normal range of motion.   Neurological: She is alert and oriented to person, place, and time.   Skin: Skin is warm and dry.   Psychiatric: She has a normal mood and affect.   Vitals reviewed.      Labs: Pertinent labs reviewed.    Assessment:  1. Chronic idiopathic constipation    2. Gastroesophageal reflux disease, esophagitis presence not specified        Recommendations:  - No improvement with Linzess  - Will give trial of AMitiza BID with meals  - Start once daily PPI for increased reflux  - Increase water and dietary fiber  - GERD diet and lifestyle discussed.   Chronic idiopathic constipation  -     lubiprostone (AMITIZA) 24 MCG Cap; Take 1 capsule (24 mcg total) by mouth 2 (two) times daily with meals.  Dispense: 60 capsule; Refill: 2    Gastroesophageal reflux disease, esophagitis presence not specified  -     omeprazole (PRILOSEC) 40 MG capsule; Take 1 capsule (40 mg total) by mouth once daily.  Dispense: 30 capsule; Refill: 11          Return to Clinic:    Follow-up in about 3 months (around 12/18/2018).

## 2018-09-21 ENCOUNTER — PATIENT MESSAGE (OUTPATIENT)
Dept: GASTROENTEROLOGY | Facility: CLINIC | Age: 48
End: 2018-09-21

## 2018-09-27 ENCOUNTER — OFFICE VISIT (OUTPATIENT)
Dept: PSYCHIATRY | Facility: CLINIC | Age: 48
End: 2018-09-27
Payer: COMMERCIAL

## 2018-09-27 DIAGNOSIS — F32.0 CURRENT MILD EPISODE OF MAJOR DEPRESSIVE DISORDER, UNSPECIFIED WHETHER RECURRENT: Primary | ICD-10-CM

## 2018-09-27 DIAGNOSIS — F51.04 PSYCHOPHYSIOLOGICAL INSOMNIA: ICD-10-CM

## 2018-09-27 PROCEDURE — 99999 PR PBB SHADOW E&M-EST. PATIENT-LVL I: CPT | Mod: PBBFAC,,, | Performed by: PSYCHIATRY & NEUROLOGY

## 2018-09-27 PROCEDURE — 99213 OFFICE O/P EST LOW 20 MIN: CPT | Mod: S$GLB,,, | Performed by: PSYCHIATRY & NEUROLOGY

## 2018-09-27 RX ORDER — BUPROPION HYDROCHLORIDE 150 MG/1
TABLET ORAL
Qty: 90 TABLET | Refills: 3 | Status: SHIPPED | OUTPATIENT
Start: 2018-09-27 | End: 2019-01-25 | Stop reason: SDUPTHER

## 2018-09-28 ENCOUNTER — TELEPHONE (OUTPATIENT)
Dept: GASTROENTEROLOGY | Facility: CLINIC | Age: 48
End: 2018-09-28

## 2018-09-28 NOTE — TELEPHONE ENCOUNTER
Called pt per [facility] request to check on her progress.  She states that she had a few days of really not feeling well and not having BM's, but realized that she was not taking the Amitiza correctly - she was only taking daily.  She states that she checked her pill bottle and saw that it was to be taken BID - she began taking it that way and started feeling better with daily BM's.  Will call us if she needs us.

## 2018-10-05 ENCOUNTER — OFFICE VISIT (OUTPATIENT)
Dept: OBSTETRICS AND GYNECOLOGY | Facility: CLINIC | Age: 48
End: 2018-10-05
Payer: COMMERCIAL

## 2018-10-05 ENCOUNTER — PATIENT MESSAGE (OUTPATIENT)
Dept: OBSTETRICS AND GYNECOLOGY | Facility: CLINIC | Age: 48
End: 2018-10-05

## 2018-10-05 ENCOUNTER — TELEPHONE (OUTPATIENT)
Dept: OBSTETRICS AND GYNECOLOGY | Facility: CLINIC | Age: 48
End: 2018-10-05

## 2018-10-05 VITALS
BODY MASS INDEX: 33.58 KG/M2 | DIASTOLIC BLOOD PRESSURE: 94 MMHG | WEIGHT: 171.06 LBS | SYSTOLIC BLOOD PRESSURE: 152 MMHG | HEIGHT: 60 IN

## 2018-10-05 DIAGNOSIS — Z98.890 POST-OPERATIVE STATE: Primary | ICD-10-CM

## 2018-10-05 DIAGNOSIS — N95.1 MENOPAUSAL SYNDROME (HOT FLASHES): ICD-10-CM

## 2018-10-05 PROCEDURE — 99999 PR PBB SHADOW E&M-EST. PATIENT-LVL III: CPT | Mod: PBBFAC,,, | Performed by: OBSTETRICS & GYNECOLOGY

## 2018-10-05 PROCEDURE — 99024 POSTOP FOLLOW-UP VISIT: CPT | Mod: S$GLB,,, | Performed by: OBSTETRICS & GYNECOLOGY

## 2018-10-05 RX ORDER — ESTRADIOL 0.5 MG/.5G
1 GEL TOPICAL DAILY
Qty: 30 PACKET | Refills: 11 | Status: SHIPPED | OUTPATIENT
Start: 2018-10-05 | End: 2019-01-15

## 2018-10-05 NOTE — TELEPHONE ENCOUNTER
Spoke with patient, needs refill of Divigel called to [facility]. Patient also wants to thank you for you help this morning.

## 2018-10-05 NOTE — LETTER
October 5, 2018      [facility]  [address] [place] [facility] [address]  Phone: [phone]  Fax: [phone]       Patient: Irasema Vang   YOB: 1970  Date of Visit: 10/05/2018    To Whom It May Concern:    Herlinda Vang  was at [facility] on 10/05/2018. She may return to work on 10/08/2018 with no restrictions. She has been under my care from 09/04/2018-10/05/2018. If you have any questions or concerns, or if I can be of further assistance, please do not hesitate to contact me.    Sincerely,            Darby Monroy MD

## 2018-10-05 NOTE — TELEPHONE ENCOUNTER
----- Message from Jeanette Dean sent at 10/5/2018  1:54 PM CDT -----  Contact: [phone]  Pt would like to consult with the nurse about Rx medication Gel. Please call back at [phone]. Thx-             Pt Uses:  .  [facility]  [address] [place] [address]  Phone: [phone] Fax: [phone]

## 2018-10-06 PROBLEM — R10.2 PELVIC PAIN: Status: RESOLVED | Noted: 2018-09-04 | Resolved: 2018-10-06

## 2018-10-06 PROBLEM — G89.29 CHRONIC PELVIC PAIN IN FEMALE: Status: RESOLVED | Noted: 2017-09-05 | Resolved: 2018-10-06

## 2018-10-06 PROBLEM — R10.2 PELVIC PAIN IN FEMALE: Status: RESOLVED | Noted: 2017-09-05 | Resolved: 2018-10-06

## 2018-10-06 PROBLEM — R10.2 CHRONIC PELVIC PAIN IN FEMALE: Status: RESOLVED | Noted: 2017-09-05 | Resolved: 2018-10-06

## 2018-10-06 NOTE — PROGRESS NOTES
Patient presents today for postoperative follow up.  Pt. underwent laparoscopic LSO as well as removal of right adnexal cyst 4 weeks ago.  Patient is recovering well and has no complaints today. She is c/o hot flashes and requests Divigel Rx.    Pathology reviewed - right cyst returned showing fallopian tube with salpingitis isthmica nodosa, left ovary with corpus luteal cyst.    Physical Exam:  General - no acute distress  Abdomen - soft, nontender and nondistended.  No masses.  Incisions are well healed with no evidence of infection.  Extremities - nontender and no edema noted.    Encounter Diagnoses   Name Primary?    Post-operative state Yes    Menopausal syndrome (hot flashes)        PLAN:  Follow up as needed.  Divigel Rx given.  Patient will also use OTC Progesterone cream.

## 2018-10-22 RX ORDER — HYDROCHLOROTHIAZIDE 25 MG/1
25 TABLET ORAL DAILY
Qty: 30 TABLET | Refills: 6 | Status: SHIPPED | OUTPATIENT
Start: 2018-10-22 | End: 2022-02-15 | Stop reason: SDUPTHER

## 2018-10-22 RX ORDER — ZOLPIDEM TARTRATE 5 MG/1
TABLET ORAL
Qty: 30 TABLET | Refills: 2 | OUTPATIENT
Start: 2018-10-22

## 2018-10-22 RX ORDER — ZOLPIDEM TARTRATE 5 MG/1
TABLET ORAL
Qty: 30 TABLET | Refills: 2 | Status: SHIPPED | OUTPATIENT
Start: 2018-10-22 | End: 2019-05-24 | Stop reason: SDUPTHER

## 2018-12-04 ENCOUNTER — LAB VISIT (OUTPATIENT)
Dept: LAB | Facility: HOSPITAL | Age: 48
End: 2018-12-04
Attending: INTERNAL MEDICINE
Payer: COMMERCIAL

## 2018-12-04 DIAGNOSIS — E87.6 HYPOKALEMIA: ICD-10-CM

## 2018-12-04 DIAGNOSIS — T50.2X5A DIURETIC-INDUCED HYPOKALEMIA: ICD-10-CM

## 2018-12-04 DIAGNOSIS — E87.6 DIURETIC-INDUCED HYPOKALEMIA: ICD-10-CM

## 2018-12-04 DIAGNOSIS — I10 HYPERTENSION GOAL BP (BLOOD PRESSURE) < 140/90: ICD-10-CM

## 2018-12-04 LAB
ANION GAP SERPL CALC-SCNC: 5 MMOL/L
BUN SERPL-MCNC: 14 MG/DL
CALCIUM SERPL-MCNC: 9.6 MG/DL
CHLORIDE SERPL-SCNC: 102 MMOL/L
CHOLEST SERPL-MCNC: 226 MG/DL
CHOLEST/HDLC SERPL: 4.3 {RATIO}
CO2 SERPL-SCNC: 32 MMOL/L
CREAT SERPL-MCNC: 0.9 MG/DL
EST. GFR  (AFRICAN AMERICAN): >60 ML/MIN/1.73 M^2
EST. GFR  (NON AFRICAN AMERICAN): >60 ML/MIN/1.73 M^2
GLUCOSE SERPL-MCNC: 90 MG/DL
HDLC SERPL-MCNC: 52 MG/DL
HDLC SERPL: 23 %
LDLC SERPL CALC-MCNC: 155.8 MG/DL
NONHDLC SERPL-MCNC: 174 MG/DL
POTASSIUM SERPL-SCNC: 3.4 MMOL/L
POTASSIUM SERPL-SCNC: 3.4 MMOL/L
SODIUM SERPL-SCNC: 139 MMOL/L
TRIGL SERPL-MCNC: 91 MG/DL

## 2018-12-04 PROCEDURE — 80048 BASIC METABOLIC PNL TOTAL CA: CPT

## 2018-12-04 PROCEDURE — 36415 COLL VENOUS BLD VENIPUNCTURE: CPT | Mod: PO

## 2018-12-04 PROCEDURE — 80061 LIPID PANEL: CPT

## 2018-12-18 ENCOUNTER — OFFICE VISIT (OUTPATIENT)
Dept: GASTROENTEROLOGY | Facility: CLINIC | Age: 48
End: 2018-12-18
Payer: COMMERCIAL

## 2018-12-18 VITALS
BODY MASS INDEX: 34.58 KG/M2 | WEIGHT: 176.13 LBS | DIASTOLIC BLOOD PRESSURE: 82 MMHG | HEIGHT: 60 IN | SYSTOLIC BLOOD PRESSURE: 128 MMHG | HEART RATE: 80 BPM

## 2018-12-18 DIAGNOSIS — F32.0 CURRENT MILD EPISODE OF MAJOR DEPRESSIVE DISORDER, UNSPECIFIED WHETHER RECURRENT: ICD-10-CM

## 2018-12-18 DIAGNOSIS — K59.04 CHRONIC IDIOPATHIC CONSTIPATION: Primary | ICD-10-CM

## 2018-12-18 PROCEDURE — 3008F BODY MASS INDEX DOCD: CPT | Mod: CPTII,S$GLB,, | Performed by: NURSE PRACTITIONER

## 2018-12-18 PROCEDURE — 99999 PR PBB SHADOW E&M-EST. PATIENT-LVL III: CPT | Mod: PBBFAC,,, | Performed by: NURSE PRACTITIONER

## 2018-12-18 PROCEDURE — 99214 OFFICE O/P EST MOD 30 MIN: CPT | Mod: S$GLB,,, | Performed by: NURSE PRACTITIONER

## 2018-12-18 PROCEDURE — 3074F SYST BP LT 130 MM HG: CPT | Mod: CPTII,S$GLB,, | Performed by: NURSE PRACTITIONER

## 2018-12-18 PROCEDURE — 3079F DIAST BP 80-89 MM HG: CPT | Mod: CPTII,S$GLB,, | Performed by: NURSE PRACTITIONER

## 2018-12-18 RX ORDER — LUBIPROSTONE 24 UG/1
24 CAPSULE ORAL 2 TIMES DAILY WITH MEALS
Qty: 60 CAPSULE | Refills: 5 | Status: SHIPPED | OUTPATIENT
Start: 2018-12-18 | End: 2021-11-09 | Stop reason: ALTCHOICE

## 2018-12-18 NOTE — PROGRESS NOTES
Clinic Follow Up:  [facility] Follow Up Note    Reason for Follow Up:  The primary encounter diagnosis was Chronic idiopathic constipation. A diagnosis of Current mild episode of major depressive disorder, unspecified whether recurrent was also pertinent to this visit.    PCP: Gladys Fernandez       HPI:  This is a 48 y.o. female here for follow up of the above  Pt states that she has been feeling well without any new complaints.  Amitiza is working well.  No complaints of diarrhea.  Has intermittent constipation which she attributes to her diet and water intake.   Denies any abdominal pain.  No nausea or vomiting.  No change in bowel pattern.  No melena or hematochezia. No weight loss.  She has had an increase in depression recently.  Has appt with her PCP this week.     Review of Systems   Constitutional: Negative for chills, fever, malaise/fatigue and weight loss.   Respiratory: Negative for cough.    Cardiovascular: Negative for chest pain.   Gastrointestinal:        Per HPI   Musculoskeletal: Negative for myalgias.   Skin: Negative for itching and rash.   Neurological: Negative for headaches.   Psychiatric/Behavioral: Positive for depression. The patient is nervous/anxious.        Medical History:  Past Medical History:   Diagnosis Date    Abnormal Pap smear     history of uterine cancer    Abnormal Pap smear of cervix     Asthma     no meds since age 20    Constipation, chronic     Depression     Family history of nephrolithiasis     General anesthetics causing adverse effect in therapeutic use     slow to wake up    History of cervical dysplasia 6/27/2013    Hypertension     Menopausal syndrome (hot flashes) 6/27/2013    Nephrolithiasis 6/27/2013    PONV (postoperative nausea and vomiting)     PTSD (post-traumatic stress disorder)     Renal cell carcinoma     Seizures     1 yr ago- caused by migraine meds--no treatment now    Urinoma     Uterine cancer 2000       Surgical  History:   Past Surgical History:   Procedure Laterality Date    COLONOSCOPY  05/17/13    non-bleeding AVMs    COLONOSCOPY W/ POLYPECTOMY      CYSTOSCOPY N/A 9/23/2013    [facility]    cystoscopy with stent placement      CYSTOSCOPY, WITH RETROGRADE PYELOGRAM AND URETERAL STENT INSERTION  7/24/2013    [facility]    ESOPHAGOGASTRODUODENOSCOPY  05/17/13    EXCISION, CYST, OVARY, LAPAROSCOPIC Right 9/4/2018    [facility]    HYSTERECTOMY      LAPAROSCOPIC LYSIS OF ADHESIONS N/A 9/4/2018    Procedure: LYSIS, ADHESIONS, LAPAROSCOPIC;  Surgeon: Darby Monroy MD;  Location: [facility];  Service: OB/GYN;  Laterality: N/A;    LAPAROSCOPIC OOPHORECTOMY Left 9/4/2018    Procedure: OOPHORECTOMY, LAPAROSCOPIC;  Surgeon: Darby Monroy MD;  Location: [facility];  Service: OB/GYN;  Laterality: Left;    LAPAROSCOPIC SURGICAL REMOVAL OF CYST OF OVARY Right 9/4/2018    Procedure: EXCISION, CYST, OVARY, LAPAROSCOPIC;  Surgeon: Darby Monroy MD;  Location: [facility];  Service: OB/GYN;  Laterality: Right;    Laparoscopy  09/05/2017    LAPAROSCOPY-DIAGNOSTIC N/A 9/5/2017    [facility]    LYSIS, ADHESIONS, LAPAROSCOPIC N/A 9/4/2018    [facility]    [id number]  9/5/2017    [facility]    OOPHORECTOMY      1 ovary removed    OOPHORECTOMY, LAPAROSCOPIC Left 9/4/2018    [facility]    REMOVAL, STENT, URETER Left 9/23/2013    [facility]    SALPINGECTOMY-LAPAROSCOPIC Left 9/5/2017    [facility]    [id number] Right 9/5/2017    [facility]    URETEROSCOPY         Family History:   Family History    Problem Relation Age of Onset    Cancer Maternal Grandmother     Diabetes Brother     Hypertension Mother     Cancer Father     Breast cancer Paternal Aunt     Ovarian cancer Paternal Aunt     Breast cancer Paternal Aunt     Breast cancer Paternal Aunt     Colon cancer Neg Hx        Social History:   Social History     Tobacco Use    Smoking status: Never Smoker    Smokeless tobacco: Never Used   Substance Use Topics    Alcohol use: Yes     Alcohol/week: 0.0 oz     Comment: occassionally     Drug use: Yes     Types: Marijuana     Comment: Liquid Cannabis Oil       Allergies: Reviewed    Home Medications:  Current Outpatient Medications on File Prior to Visit   Medication Sig Dispense Refill    buPROPion (WELLBUTRIN XL) 150 MG TB24 tablet Take 3 tablets daily 90 tablet 3    epinephrine (EPIPEN) 0.3 mg/0.3 mL AtIn Inject into the muscle as needed. 2 each 6    estradiol (DIVIGEL) 0.5 mg/0.5 gram (0.1 %) GlPk Place 1 application onto the skin once daily. 30 packet 11    hydroCHLOROthiazide (HYDRODIURIL) 25 MG tablet Take 1 tablet (25 mg total) by mouth once daily. 30 tablet 6    ibuprofen (ADVIL,MOTRIN) 800 MG tablet Take 1 tablet (800 mg total) by mouth every 6 (six) hours as needed for Pain. 30 tablet 1    omeprazole (PRILOSEC) 40 MG capsule Take 1 capsule (40 mg total) by mouth once daily. 30 capsule 11    potassium chloride SA (K-DUR,KLOR-CON) 20 MEQ tablet Take 1 tablet (20 mEq total) by mouth 2 (two) times daily. (Patient taking differently: Take 20 mEq by mouth once daily. ) 60 tablet 3    zolpidem (AMBIEN) 5 MG Tab Take 1/2 to 1 tablet at bedtime as needed for sleep 30 tablet 2    [DISCONTINUED] lubiprostone (AMITIZA) 24 MCG Cap Take 1 capsule (24 mcg total) by mouth 2 (two) times daily with meals. 60 capsule 2    [DISCONTINUED] biotin 10 mg Tab Take by mouth.       Current Facility-Administered Medications on File Prior to Visit   Medication Dose Route Frequency Provider Last Rate Last  Dose    lactated ringers infusion   Intravenous Continuous Darby Monroy MD   Stopped at 09/04/18 1347    nozaseptin (NOZIN) nasal    Each Nare On Call Procedure Darby Monroy MD        phenazopyridine tablet 200 mg  200 mg Oral On Call Procedure Darby Monroy MD   200 mg at 09/04/18 1108       Physical Exam:  Vital Signs:  /82   Pulse 80   Ht 5' (1.524 m)   Wt 79.9 kg (176 lb 2.4 oz)   LMP 01/05/2001   BMI 34.40 kg/m²   Body mass index is 34.4 kg/m².  Physical Exam   Constitutional: She is oriented to person, place, and time. She appears well-developed and well-nourished.   HENT:   Head: Normocephalic.   Eyes: No scleral icterus.   Neck: Normal range of motion.   Cardiovascular: Normal rate and regular rhythm.   Pulmonary/Chest: Effort normal and breath sounds normal.   Abdominal: Soft. Bowel sounds are normal. She exhibits no distension. There is no tenderness.   Musculoskeletal: Normal range of motion.   Neurological: She is alert and oriented to person, place, and time.   Skin: Skin is warm and dry.   Psychiatric: She has a normal mood and affect.   Vitals reviewed.      Labs: Pertinent labs reviewed.    Assessment:  1. Chronic idiopathic constipation    2. Current mild episode of major depressive disorder, unspecified whether recurrent        Recommendations:  - continue current medications  - can add miralax PRN for breakthrough constipation  - Discuss depression with PCP    Chronic idiopathic constipation  -     lubiprostone (AMITIZA) 24 MCG Cap; Take 1 capsule (24 mcg total) by mouth 2 (two) times daily with meals.  Dispense: 60 capsule; Refill: 5    Current mild episode of major depressive disorder, unspecified whether recurrent          Return to Clinic:    Follow-up in about 6 months (around 6/18/2019).

## 2018-12-21 ENCOUNTER — OFFICE VISIT (OUTPATIENT)
Dept: INTERNAL MEDICINE | Facility: CLINIC | Age: 48
End: 2018-12-21
Payer: COMMERCIAL

## 2018-12-21 VITALS
HEIGHT: 63 IN | TEMPERATURE: 98 F | DIASTOLIC BLOOD PRESSURE: 82 MMHG | BODY MASS INDEX: 31.29 KG/M2 | SYSTOLIC BLOOD PRESSURE: 132 MMHG | HEART RATE: 89 BPM | OXYGEN SATURATION: 98 % | WEIGHT: 176.56 LBS

## 2018-12-21 DIAGNOSIS — Z91.013 SHELLFISH ALLERGY: ICD-10-CM

## 2018-12-21 DIAGNOSIS — B37.9 YEAST INFECTION: ICD-10-CM

## 2018-12-21 DIAGNOSIS — E87.6 DIURETIC-INDUCED HYPOKALEMIA: ICD-10-CM

## 2018-12-21 DIAGNOSIS — Z91.010 PEANUT ALLERGY: ICD-10-CM

## 2018-12-21 DIAGNOSIS — T50.2X5A DIURETIC-INDUCED HYPOKALEMIA: ICD-10-CM

## 2018-12-21 DIAGNOSIS — I10 HYPERTENSION GOAL BP (BLOOD PRESSURE) < 140/90: Primary | ICD-10-CM

## 2018-12-21 DIAGNOSIS — E78.5 HYPERLIPIDEMIA LDL GOAL <130: ICD-10-CM

## 2018-12-21 DIAGNOSIS — Z23 IMMUNIZATION DUE: ICD-10-CM

## 2018-12-21 PROCEDURE — 3079F DIAST BP 80-89 MM HG: CPT | Mod: CPTII,S$GLB,, | Performed by: INTERNAL MEDICINE

## 2018-12-21 PROCEDURE — 90670 PCV13 VACCINE IM: CPT | Mod: S$GLB,,, | Performed by: INTERNAL MEDICINE

## 2018-12-21 PROCEDURE — 90471 IMMUNIZATION ADMIN: CPT | Mod: S$GLB,,, | Performed by: INTERNAL MEDICINE

## 2018-12-21 PROCEDURE — 3008F BODY MASS INDEX DOCD: CPT | Mod: CPTII,S$GLB,, | Performed by: INTERNAL MEDICINE

## 2018-12-21 PROCEDURE — 3075F SYST BP GE 130 - 139MM HG: CPT | Mod: CPTII,S$GLB,, | Performed by: INTERNAL MEDICINE

## 2018-12-21 PROCEDURE — 99999 PR PBB SHADOW E&M-EST. PATIENT-LVL IV: CPT | Mod: PBBFAC,,, | Performed by: INTERNAL MEDICINE

## 2018-12-21 PROCEDURE — 99214 OFFICE O/P EST MOD 30 MIN: CPT | Mod: 25,S$GLB,, | Performed by: INTERNAL MEDICINE

## 2018-12-21 RX ORDER — FLUCONAZOLE 150 MG/1
150 TABLET ORAL ONCE
Qty: 1 TABLET | Refills: 0 | Status: SHIPPED | OUTPATIENT
Start: 2018-12-21 | End: 2018-12-28

## 2018-12-21 RX ORDER — EPINEPHRINE 0.3 MG/.3ML
INJECTION SUBCUTANEOUS
Qty: 2 EACH | Refills: 6 | Status: SHIPPED | OUTPATIENT
Start: 2018-12-21

## 2018-12-21 RX ORDER — PRAVASTATIN SODIUM 10 MG/1
10 TABLET ORAL NIGHTLY
Qty: 30 TABLET | Refills: 3 | Status: SHIPPED | OUTPATIENT
Start: 2018-12-21 | End: 2022-04-07

## 2018-12-21 NOTE — PROGRESS NOTES
Subjective:       Patient ID: Irasema Vang is a 48 y.o. female.    Chief Complaint: Follow-up (labs)    Irasema Vang  48 y.o. Black or  female    Patient presents with:  Follow-up: labs    HPI: Here today to follow up on chronic conditions.  HTN--stable on HCTZ.   Hypokalemia--she does not take potassium as prescribed. She denies symptoms.   HLD--worsened. She admits to diet and exercise non compliance. She has never been on medication.               CHOL                     226 (H)             12/04/2018                 HDL                      52                  12/04/2018                LDLCALC                  155.8               12/04/2018                 TRIG                     91                  12/04/2018            Peanut/shellfish allergy--she needs a refill on her Epipen.   She has symptoms of a yeast infection and would like a prescription. She reports itching in the vulva area. She denies discharge.       Past Medical History:  Abnormal Pap smear      Comment:  history of uterine cancer  Abnormal Pap smear of cervix  Asthma      Comment:  no meds since age 20  Constipation, chronic  Depression  Family history of nephrolithiasis  General anesthetics causing adverse effect in therapeutic use      Comment:  slow to wake up  6/27/2013: History of cervical dysplasia  Hypertension  6/27/2013: Menopausal syndrome (hot flashes)  6/27/2013: Nephrolithiasis  PONV (postoperative nausea and vomiting)  PTSD (post-traumatic stress disorder)  Renal cell carcinoma  Seizures      Comment:  1 yr ago- caused by migraine meds--no treatment now  Urinoma  2000: Uterine cancer    Current Outpatient Medications on File Prior to Visit:  buPROPion (WELLBUTRIN XL) 150 MG TB24 tablet, Take 3 tablets daily, Disp: 90 tablet, Rfl: 3  estradiol (DIVIGEL) 0.5 mg/0.5 gram (0.1 %) GlPk, Place 1 application onto the skin once daily., Disp: 30 packet, Rfl: 11  hydroCHLOROthiazide (HYDRODIURIL) 25 MG tablet,  Take 1 tablet (25 mg total) by mouth once daily., Disp: 30 tablet, Rfl: 6  ibuprofen (ADVIL,MOTRIN) 800 MG tablet, Take 1 tablet (800 mg total) by mouth every 6 (six) hours as needed for Pain., Disp: 30 tablet, Rfl: 1  lubiprostone (AMITIZA) 24 MCG Cap, Take 1 capsule (24 mcg total) by mouth 2 (two) times daily with meals., Disp: 60 capsule, Rfl: 5  omeprazole (PRILOSEC) 40 MG capsule, Take 1 capsule (40 mg total) by mouth once daily., Disp: 30 capsule, Rfl: 11  potassium chloride SA (K-DUR,KLOR-CON) 20 MEQ tablet, Take 1 tablet (20 mEq total) by mouth 2 (two) times daily. (Patient taking differently: Take 20 mEq by mouth once daily. ), Disp: 60 tablet, Rfl: 3  zolpidem (AMBIEN) 5 MG Tab, Take 1/2 to 1 tablet at bedtime as needed for sleep, Disp: 30 tablet, Rfl: 2  epinephrine (EPIPEN) 0.3 mg/0.3 mL AtIn, Inject into the muscle as needed., Disp: 2 each, Rfl: 6  FLUARIX QUAD 7846-7109, PF, 60 mcg (15 mcg x 4)/0.5 mL Syrg vaccine, ADM 0.5ML IM UTD, Disp: , Rfl: 0    Allergies:  Review of patient's allergies indicates:   -- Shellfish containing products    -- Codeine -- Itching   -- Latex, natural rubber -- Hives   -- Peanut           Review of Systems   Constitutional: Negative for activity change and unexpected weight change.   HENT: Negative for hearing loss and rhinorrhea.    Eyes: Positive for visual disturbance. Negative for discharge.   Respiratory: Negative for chest tightness and wheezing.    Cardiovascular: Negative for chest pain and palpitations.   Gastrointestinal: Positive for constipation. Negative for blood in stool, diarrhea and vomiting.   Endocrine: Negative for polydipsia and polyuria.   Genitourinary: Negative for difficulty urinating, dysuria, hematuria, menstrual problem and vaginal discharge.   Musculoskeletal: Negative for arthralgias, joint swelling and neck pain.   Neurological: Negative for weakness and headaches.   Psychiatric/Behavioral: Positive for dysphoric mood. Negative for confusion.        Objective:      Physical Exam   Constitutional: She is oriented to person, place, and time. She appears well-developed and well-nourished. No distress.   Eyes: No scleral icterus.   Neck: No tracheal deviation present.   Cardiovascular: Normal rate, regular rhythm and normal heart sounds.   Pulmonary/Chest: Effort normal and breath sounds normal. No respiratory distress.   Abdominal: Soft. Bowel sounds are normal.   Neurological: She is alert and oriented to person, place, and time.   Skin: Skin is warm and dry.   Psychiatric: She has a normal mood and affect.   Vitals reviewed.      Assessment:       1. Hypertension goal BP (blood pressure) < 140/90    2. Diuretic-induced hypokalemia    3. Hyperlipidemia LDL goal <130    4. Peanut allergy    5. Shellfish allergy    6. Yeast infection    7. Immunization due        Plan:       Irasema was seen today for follow-up.    Diagnoses and all orders for this visit:    Hypertension goal BP (blood pressure) < 140/90  -     Comprehensive metabolic panel; Standing  -     Lipid panel; Standing    Diuretic-induced hypokalemia  -     Comprehensive metabolic panel; Standing    Hyperlipidemia LDL goal <130  -     pravastatin (PRAVACHOL) 10 MG tablet; Take 1 tablet (10 mg total) by mouth every evening. Discussed medication risks and benefits.   -     Lifestyle modifications discussed  -     Comprehensive metabolic panel; Standing  -     Lipid panel; Standing    Peanut allergy  -     EPINEPHrine (EPIPEN) 0.3 mg/0.3 mL AtIn; Inject into the muscle as needed.    Shellfish allergy  -     EPINEPHrine (EPIPEN) 0.3 mg/0.3 mL AtIn; Inject into the muscle as needed.    Yeast infection  -     fluconazole (DIFLUCAN) 150 MG Tab; Take 1 tablet (150 mg total) by mouth once. for 1 dose    Immunization due  -     (In Office Administered) Pneumococcal Conjugate Vaccine (13 Valent) (IM)    Labs and f/u in 3 months.

## 2018-12-27 ENCOUNTER — IMMUNIZATION (OUTPATIENT)
Dept: PHARMACY | Facility: CLINIC | Age: 48
End: 2018-12-27
Payer: OTHER GOVERNMENT

## 2019-01-10 DIAGNOSIS — T50.2X5A DIURETIC-INDUCED HYPOKALEMIA: ICD-10-CM

## 2019-01-10 DIAGNOSIS — E87.6 DIURETIC-INDUCED HYPOKALEMIA: ICD-10-CM

## 2019-01-10 RX ORDER — POTASSIUM CHLORIDE 20 MEQ/1
20 TABLET, EXTENDED RELEASE ORAL 2 TIMES DAILY
Qty: 60 TABLET | Refills: 3 | Status: CANCELLED | OUTPATIENT
Start: 2019-01-10

## 2019-01-14 ENCOUNTER — PATIENT MESSAGE (OUTPATIENT)
Dept: OBSTETRICS AND GYNECOLOGY | Facility: CLINIC | Age: 49
End: 2019-01-14

## 2019-01-15 ENCOUNTER — PATIENT MESSAGE (OUTPATIENT)
Dept: OBSTETRICS AND GYNECOLOGY | Facility: CLINIC | Age: 49
End: 2019-01-15

## 2019-01-15 RX ORDER — ESTRADIOL 1 MG/G
1 GEL TOPICAL DAILY
Qty: 30 G | Refills: 12 | Status: SHIPPED | OUTPATIENT
Start: 2019-01-15 | End: 2021-09-30
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
## 2019-01-25 ENCOUNTER — OFFICE VISIT (OUTPATIENT)
Dept: PSYCHIATRY | Facility: CLINIC | Age: 49
End: 2019-01-25
Payer: COMMERCIAL

## 2019-01-25 VITALS
SYSTOLIC BLOOD PRESSURE: 143 MMHG | HEART RATE: 85 BPM | BODY MASS INDEX: 31.29 KG/M2 | DIASTOLIC BLOOD PRESSURE: 98 MMHG | WEIGHT: 175.25 LBS

## 2019-01-25 DIAGNOSIS — F32.9 MAJOR DEPRESSIVE DISORDER WITH CURRENT ACTIVE EPISODE, UNSPECIFIED DEPRESSION EPISODE SEVERITY, UNSPECIFIED WHETHER RECURRENT: Primary | ICD-10-CM

## 2019-01-25 DIAGNOSIS — F51.04 PSYCHOPHYSIOLOGICAL INSOMNIA: ICD-10-CM

## 2019-01-25 PROCEDURE — 99999 PR PBB SHADOW E&M-EST. PATIENT-LVL II: CPT | Mod: PBBFAC,,, | Performed by: PSYCHIATRY & NEUROLOGY

## 2019-01-25 PROCEDURE — 99214 PR OFFICE/OUTPT VISIT, EST, LEVL IV, 30-39 MIN: ICD-10-PCS | Mod: S$GLB,,, | Performed by: PSYCHIATRY & NEUROLOGY

## 2019-01-25 PROCEDURE — 3080F DIAST BP >= 90 MM HG: CPT | Mod: CPTII,S$GLB,, | Performed by: PSYCHIATRY & NEUROLOGY

## 2019-01-25 PROCEDURE — 99214 OFFICE O/P EST MOD 30 MIN: CPT | Mod: S$GLB,,, | Performed by: PSYCHIATRY & NEUROLOGY

## 2019-01-25 PROCEDURE — 99999 PR PBB SHADOW E&M-EST. PATIENT-LVL II: ICD-10-PCS | Mod: PBBFAC,,, | Performed by: PSYCHIATRY & NEUROLOGY

## 2019-01-25 PROCEDURE — 3077F SYST BP >= 140 MM HG: CPT | Mod: CPTII,S$GLB,, | Performed by: PSYCHIATRY & NEUROLOGY

## 2019-01-25 PROCEDURE — 3008F PR BODY MASS INDEX (BMI) DOCUMENTED: ICD-10-PCS | Mod: CPTII,S$GLB,, | Performed by: PSYCHIATRY & NEUROLOGY

## 2019-01-25 PROCEDURE — 3077F PR MOST RECENT SYSTOLIC BLOOD PRESSURE >= 140 MM HG: ICD-10-PCS | Mod: CPTII,S$GLB,, | Performed by: PSYCHIATRY & NEUROLOGY

## 2019-01-25 PROCEDURE — 3080F PR MOST RECENT DIASTOLIC BLOOD PRESSURE >= 90 MM HG: ICD-10-PCS | Mod: CPTII,S$GLB,, | Performed by: PSYCHIATRY & NEUROLOGY

## 2019-01-25 PROCEDURE — 3008F BODY MASS INDEX DOCD: CPT | Mod: CPTII,S$GLB,, | Performed by: PSYCHIATRY & NEUROLOGY

## 2019-01-25 RX ORDER — BUPROPION HYDROCHLORIDE 150 MG/1
TABLET ORAL
Qty: 90 TABLET | Refills: 3 | Status: SHIPPED | OUTPATIENT
Start: 2019-01-25 | End: 2019-05-24 | Stop reason: SDUPTHER

## 2019-02-03 ENCOUNTER — PATIENT MESSAGE (OUTPATIENT)
Dept: INTERNAL MEDICINE | Facility: CLINIC | Age: 49
End: 2019-02-03

## 2019-02-04 DIAGNOSIS — T50.2X5A DIURETIC-INDUCED HYPOKALEMIA: ICD-10-CM

## 2019-02-04 DIAGNOSIS — E87.6 DIURETIC-INDUCED HYPOKALEMIA: ICD-10-CM

## 2019-02-04 RX ORDER — POTASSIUM CHLORIDE 20 MEQ/1
20 TABLET, EXTENDED RELEASE ORAL 2 TIMES DAILY
Qty: 60 TABLET | Refills: 3 | Status: SHIPPED | OUTPATIENT
Start: 2019-02-04 | End: 2021-11-09 | Stop reason: DRUGHIGH

## 2019-03-14 ENCOUNTER — TELEPHONE (OUTPATIENT)
Dept: PSYCHIATRY | Facility: CLINIC | Age: 49
End: 2019-03-14

## 2019-03-14 NOTE — TELEPHONE ENCOUNTER
Called to inform pt of the prior authorization received from the [facility] for her visits with Dr Jackson.  Pt was very grateful.

## 2019-03-14 NOTE — TELEPHONE ENCOUNTER
----- Message from Rayna Haro sent at 3/14/2019 10:52 AM CDT -----  Regarding: VA Authorization  Contact: [phone]  We rcvd VA authorization for the pt to see Dr. Beltran for 10 visits. Scanned authorization with additional psych notes from the [facility] into pt . Patient can now be scheduled for additional visits if needed. Referral has also been created.     Thanks,   Rayna

## 2019-05-24 ENCOUNTER — OFFICE VISIT (OUTPATIENT)
Dept: PSYCHIATRY | Facility: CLINIC | Age: 49
End: 2019-05-24
Payer: OTHER GOVERNMENT

## 2019-05-24 VITALS
DIASTOLIC BLOOD PRESSURE: 87 MMHG | SYSTOLIC BLOOD PRESSURE: 126 MMHG | HEART RATE: 80 BPM | WEIGHT: 170.19 LBS | BODY MASS INDEX: 30.39 KG/M2

## 2019-05-24 DIAGNOSIS — F32.0 CURRENT MILD EPISODE OF MAJOR DEPRESSIVE DISORDER, UNSPECIFIED WHETHER RECURRENT: Primary | ICD-10-CM

## 2019-05-24 PROCEDURE — 99999 PR PBB SHADOW E&M-EST. PATIENT-LVL III: ICD-10-PCS | Mod: PBBFAC,,, | Performed by: PSYCHIATRY & NEUROLOGY

## 2019-05-24 PROCEDURE — 99213 OFFICE O/P EST LOW 20 MIN: CPT | Mod: PBBFAC | Performed by: PSYCHIATRY & NEUROLOGY

## 2019-05-24 PROCEDURE — 99999 PR PBB SHADOW E&M-EST. PATIENT-LVL III: CPT | Mod: PBBFAC,,, | Performed by: PSYCHIATRY & NEUROLOGY

## 2019-05-24 PROCEDURE — 99213 OFFICE O/P EST LOW 20 MIN: CPT | Mod: S$PBB,,, | Performed by: PSYCHIATRY & NEUROLOGY

## 2019-05-24 PROCEDURE — 99213 PR OFFICE/OUTPT VISIT, EST, LEVL III, 20-29 MIN: ICD-10-PCS | Mod: S$PBB,,, | Performed by: PSYCHIATRY & NEUROLOGY

## 2019-05-24 RX ORDER — BUPROPION HYDROCHLORIDE 150 MG/1
TABLET ORAL
Qty: 90 TABLET | Refills: 3 | Status: SHIPPED | OUTPATIENT
Start: 2019-05-24 | End: 2019-09-18 | Stop reason: SDUPTHER

## 2019-05-24 RX ORDER — ZOLPIDEM TARTRATE 5 MG/1
TABLET ORAL
Qty: 30 TABLET | Refills: 2 | Status: SHIPPED | OUTPATIENT
Start: 2019-05-24 | End: 2019-09-18 | Stop reason: SDUPTHER
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
## 2019-09-18 ENCOUNTER — OFFICE VISIT (OUTPATIENT)
Dept: PSYCHIATRY | Facility: CLINIC | Age: 49
End: 2019-09-18
Payer: OTHER GOVERNMENT

## 2019-09-18 DIAGNOSIS — F41.9 ANXIETY: ICD-10-CM

## 2019-09-18 DIAGNOSIS — F33.1 MODERATE EPISODE OF RECURRENT MAJOR DEPRESSIVE DISORDER: Primary | ICD-10-CM

## 2019-09-18 PROCEDURE — 99214 OFFICE O/P EST MOD 30 MIN: CPT | Mod: S$PBB,,, | Performed by: PSYCHIATRY & NEUROLOGY

## 2019-09-18 PROCEDURE — 99212 OFFICE O/P EST SF 10 MIN: CPT | Mod: PBBFAC | Performed by: PSYCHIATRY & NEUROLOGY

## 2019-09-18 PROCEDURE — 99214 PR OFFICE/OUTPT VISIT, EST, LEVL IV, 30-39 MIN: ICD-10-PCS | Mod: S$PBB,,, | Performed by: PSYCHIATRY & NEUROLOGY

## 2019-09-18 PROCEDURE — 99999 PR PBB SHADOW E&M-EST. PATIENT-LVL II: CPT | Mod: PBBFAC,,, | Performed by: PSYCHIATRY & NEUROLOGY

## 2019-09-18 PROCEDURE — 99999 PR PBB SHADOW E&M-EST. PATIENT-LVL II: ICD-10-PCS | Mod: PBBFAC,,, | Performed by: PSYCHIATRY & NEUROLOGY

## 2019-09-18 RX ORDER — BUSPIRONE HYDROCHLORIDE 30 MG/1
30 TABLET ORAL 2 TIMES DAILY
Qty: 60 TABLET | Refills: 1 | Status: SHIPPED | OUTPATIENT
Start: 2019-09-18 | End: 2019-11-13 | Stop reason: SDUPTHER

## 2019-09-18 RX ORDER — ZOLPIDEM TARTRATE 5 MG/1
TABLET ORAL
Qty: 30 TABLET | Refills: 1 | Status: SHIPPED | OUTPATIENT
Start: 2019-09-18 | End: 2019-11-13 | Stop reason: SDUPTHER

## 2019-09-18 RX ORDER — BUPROPION HYDROCHLORIDE 150 MG/1
TABLET ORAL
Qty: 90 TABLET | Refills: 1 | Status: SHIPPED | OUTPATIENT
Start: 2019-09-18 | End: 2019-11-13 | Stop reason: SDUPTHER
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
## 2019-11-01 NOTE — ANESTHESIA POSTPROCEDURE EVALUATION
Anesthesia Post Evaluation    Patient: Irasema Vang    Procedure(s) Performed: Procedure(s) (LRB):  LAPAROSCOPY-DIAGNOSTIC (N/A)  SALPINGECTOMY-LAPAROSCOPIC (Bilateral)  OOPHORECTOMY-LAPAROSCOPIC (Right)    Final Anesthesia Type: general  Patient location during evaluation: PACU  Patient participation: Yes- Able to Participate  Level of consciousness: awake and alert  Post-procedure vital signs: reviewed and stable  Pain management: adequate  Airway patency: patent  PONV status at discharge: No PONV  Anesthetic complications: no      Cardiovascular status: blood pressure returned to baseline  Respiratory status: unassisted and spontaneous ventilation  Hydration status: euvolemic  Follow-up not needed.        Visit Vitals  BP (!) 142/79   Pulse 68   Temp 36.5 °C (97.7 °F) (Temporal)   Resp 14   Ht 5' (1.524 m)   Wt 79.5 kg (175 lb 4.3 oz)   LMP 01/05/2001   SpO2 97%   Breastfeeding? No   BMI 34.23 kg/m²       Pain/Rasheed Score: Pain Assessment Performed: Yes (9/5/2017  3:45 PM)  Presence of Pain: complains of pain/discomfort (9/5/2017  4:38 PM)  Pain Rating Prior to Med Admin: 8 (9/5/2017  3:33 PM)  Rasheed Score: 10 (9/5/2017  4:38 PM)      
Yes

## 2019-11-13 ENCOUNTER — OFFICE VISIT (OUTPATIENT)
Dept: PSYCHIATRY | Facility: CLINIC | Age: 49
End: 2019-11-13
Payer: OTHER GOVERNMENT

## 2019-11-13 VITALS
WEIGHT: 175.06 LBS | HEART RATE: 82 BPM | BODY MASS INDEX: 31.26 KG/M2 | DIASTOLIC BLOOD PRESSURE: 94 MMHG | SYSTOLIC BLOOD PRESSURE: 134 MMHG

## 2019-11-13 DIAGNOSIS — F32.0 CURRENT MILD EPISODE OF MAJOR DEPRESSIVE DISORDER, UNSPECIFIED WHETHER RECURRENT: Primary | ICD-10-CM

## 2019-11-13 DIAGNOSIS — F41.9 ANXIETY: ICD-10-CM

## 2019-11-13 DIAGNOSIS — G47.00 INSOMNIA, UNSPECIFIED TYPE: ICD-10-CM

## 2019-11-13 PROCEDURE — 99999 PR PBB SHADOW E&M-EST. PATIENT-LVL III: ICD-10-PCS | Mod: PBBFAC,,, | Performed by: PSYCHIATRY & NEUROLOGY

## 2019-11-13 PROCEDURE — 99213 PR OFFICE/OUTPT VISIT, EST, LEVL III, 20-29 MIN: ICD-10-PCS | Mod: S$PBB,,, | Performed by: PSYCHIATRY & NEUROLOGY

## 2019-11-13 PROCEDURE — 99999 PR PBB SHADOW E&M-EST. PATIENT-LVL III: CPT | Mod: PBBFAC,,, | Performed by: PSYCHIATRY & NEUROLOGY

## 2019-11-13 PROCEDURE — 99213 OFFICE O/P EST LOW 20 MIN: CPT | Mod: PBBFAC | Performed by: PSYCHIATRY & NEUROLOGY

## 2019-11-13 PROCEDURE — 99213 OFFICE O/P EST LOW 20 MIN: CPT | Mod: S$PBB,,, | Performed by: PSYCHIATRY & NEUROLOGY

## 2019-11-13 RX ORDER — BUPROPION HYDROCHLORIDE 150 MG/1
TABLET ORAL
Qty: 90 TABLET | Refills: 3 | Status: SHIPPED | OUTPATIENT
Start: 2019-11-13 | End: 2020-03-25

## 2019-11-13 RX ORDER — BUSPIRONE HYDROCHLORIDE 30 MG/1
30 TABLET ORAL 2 TIMES DAILY
Qty: 60 TABLET | Refills: 3 | Status: SHIPPED | OUTPATIENT
Start: 2019-11-13 | End: 2020-03-25 | Stop reason: SDUPTHER

## 2019-11-13 RX ORDER — ZOLPIDEM TARTRATE 5 MG/1
TABLET ORAL
Qty: 30 TABLET | Refills: 3 | Status: SHIPPED | OUTPATIENT
Start: 2019-11-13 | End: 2020-03-25 | Stop reason: SDUPTHER
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
## 2020-02-06 ENCOUNTER — TELEPHONE (OUTPATIENT)
Dept: INTERNAL MEDICINE | Facility: CLINIC | Age: 50
End: 2020-02-06

## 2020-02-06 NOTE — TELEPHONE ENCOUNTER
HTN registry  Not Seen by Dr. Fernandez >12 months.  No outreach yet  Please call to get her seen for Annual, HTN recheck

## 2020-02-07 ENCOUNTER — PATIENT OUTREACH (OUTPATIENT)
Dept: ADMINISTRATIVE | Facility: HOSPITAL | Age: 50
End: 2020-02-07

## 2020-02-07 NOTE — PROGRESS NOTES
Discussed overdue for annual exam and follow up hypertension. States [facility] has [facility]. Chart updated.

## 2020-03-25 ENCOUNTER — OFFICE VISIT (OUTPATIENT)
Dept: PSYCHIATRY | Facility: CLINIC | Age: 50
End: 2020-03-25
Payer: OTHER GOVERNMENT

## 2020-03-25 DIAGNOSIS — F33.1 MODERATE EPISODE OF RECURRENT MAJOR DEPRESSIVE DISORDER: Primary | ICD-10-CM

## 2020-03-25 PROCEDURE — 99214 PR OFFICE/OUTPT VISIT, EST, LEVL IV, 30-39 MIN: ICD-10-PCS | Mod: 95,,, | Performed by: PSYCHIATRY & NEUROLOGY

## 2020-03-25 PROCEDURE — 99214 OFFICE O/P EST MOD 30 MIN: CPT | Mod: 95,,, | Performed by: PSYCHIATRY & NEUROLOGY

## 2020-03-25 RX ORDER — BUSPIRONE HYDROCHLORIDE 30 MG/1
30 TABLET ORAL 2 TIMES DAILY
Qty: 60 TABLET | Refills: 1 | Status: SHIPPED | OUTPATIENT
Start: 2020-03-25 | End: 2020-06-08 | Stop reason: SDUPTHER

## 2020-03-25 RX ORDER — FLUVOXAMINE MALEATE 100 MG/1
TABLET, COATED ORAL
Qty: 30 TABLET | Refills: 2 | Status: SHIPPED | OUTPATIENT
Start: 2020-03-25 | End: 2020-06-08

## 2020-03-25 RX ORDER — ZOLPIDEM TARTRATE 5 MG/1
TABLET ORAL
Qty: 30 TABLET | Refills: 0 | Status: SHIPPED | OUTPATIENT
Start: 2020-03-25 | End: 2020-12-03 | Stop reason: SDUPTHER
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
## 2020-05-13 ENCOUNTER — PATIENT MESSAGE (OUTPATIENT)
Dept: PSYCHIATRY | Facility: CLINIC | Age: 50
End: 2020-05-13

## 2020-05-13 ENCOUNTER — TELEPHONE (OUTPATIENT)
Dept: PSYCHIATRY | Facility: CLINIC | Age: 50
End: 2020-05-13

## 2020-06-09 RX ORDER — FLUOXETINE HYDROCHLORIDE 20 MG/1
20 CAPSULE ORAL DAILY
Qty: 30 CAPSULE | Refills: 1 | Status: SHIPPED | OUTPATIENT
Start: 2020-06-09 | End: 2020-08-07

## 2020-06-09 RX ORDER — BUSPIRONE HYDROCHLORIDE 30 MG/1
30 TABLET ORAL 2 TIMES DAILY
Qty: 60 TABLET | Refills: 1 | Status: SHIPPED | OUTPATIENT
Start: 2020-06-09 | End: 2020-08-07 | Stop reason: SDUPTHER

## 2020-08-07 ENCOUNTER — OFFICE VISIT (OUTPATIENT)
Dept: PSYCHIATRY | Facility: CLINIC | Age: 50
End: 2020-08-07
Payer: OTHER GOVERNMENT

## 2020-08-07 DIAGNOSIS — F41.9 ANXIETY: ICD-10-CM

## 2020-08-07 DIAGNOSIS — F33.1 MODERATE EPISODE OF RECURRENT MAJOR DEPRESSIVE DISORDER: Primary | ICD-10-CM

## 2020-08-07 PROCEDURE — 99214 OFFICE O/P EST MOD 30 MIN: CPT | Mod: 95,,, | Performed by: PSYCHIATRY & NEUROLOGY

## 2020-08-07 PROCEDURE — 99214 PR OFFICE/OUTPT VISIT, EST, LEVL IV, 30-39 MIN: ICD-10-PCS | Mod: 95,,, | Performed by: PSYCHIATRY & NEUROLOGY

## 2020-08-07 RX ORDER — DESVENLAFAXINE SUCCINATE 50 MG/1
50 TABLET, EXTENDED RELEASE ORAL DAILY
Qty: 30 TABLET | Refills: 2 | Status: SHIPPED | OUTPATIENT
Start: 2020-08-07 | End: 2020-12-03

## 2020-08-07 RX ORDER — BUSPIRONE HYDROCHLORIDE 30 MG/1
30 TABLET ORAL 2 TIMES DAILY
Qty: 60 TABLET | Refills: 1 | Status: SHIPPED | OUTPATIENT
Start: 2020-08-07 | End: 2020-12-03 | Stop reason: SDUPTHER
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
## 2020-10-15 ENCOUNTER — HOSPITAL ENCOUNTER (OUTPATIENT)
Dept: RADIOLOGY | Facility: HOSPITAL | Age: 50
Discharge: HOME | End: 2020-10-15
Attending: NURSE PRACTITIONER
Payer: OTHER GOVERNMENT

## 2020-10-15 VITALS — WEIGHT: 175.06 LBS | BODY MASS INDEX: 31.02 KG/M2 | HEIGHT: 63 IN

## 2020-10-15 DIAGNOSIS — Z12.31 ENCOUNTER FOR SCREENING MAMMOGRAM FOR MALIGNANT NEOPLASM OF BREAST: ICD-10-CM

## 2020-10-15 PROCEDURE — 77067 SCR MAMMO BI INCL CAD: CPT | Mod: TC

## 2020-10-15 PROCEDURE — 77063 MAMMO DIGITAL SCREENING BILAT WITH TOMO: ICD-10-PCS | Mod: 26,,, | Performed by: RADIOLOGY

## 2020-10-15 PROCEDURE — 77063 BREAST TOMOSYNTHESIS BI: CPT | Mod: 26,,, | Performed by: RADIOLOGY

## 2020-10-15 PROCEDURE — 77067 SCR MAMMO BI INCL CAD: CPT | Mod: 26,,, | Performed by: RADIOLOGY

## 2020-10-15 PROCEDURE — 77067 MAMMO DIGITAL SCREENING BILAT WITH TOMO: ICD-10-PCS | Mod: 26,,, | Performed by: RADIOLOGY

## 2020-12-03 ENCOUNTER — OFFICE VISIT (OUTPATIENT)
Dept: PSYCHIATRY | Facility: CLINIC | Age: 50
End: 2020-12-03
Payer: OTHER GOVERNMENT

## 2020-12-03 DIAGNOSIS — F41.9 ANXIETY: ICD-10-CM

## 2020-12-03 DIAGNOSIS — F33.1 MODERATE EPISODE OF RECURRENT MAJOR DEPRESSIVE DISORDER: Primary | ICD-10-CM

## 2020-12-03 DIAGNOSIS — G47.00 INSOMNIA, UNSPECIFIED TYPE: ICD-10-CM

## 2020-12-03 PROCEDURE — 99214 PR OFFICE/OUTPT VISIT, EST, LEVL IV, 30-39 MIN: ICD-10-PCS | Mod: 95,,, | Performed by: PSYCHIATRY & NEUROLOGY

## 2020-12-03 PROCEDURE — 99214 OFFICE O/P EST MOD 30 MIN: CPT | Mod: 95,,, | Performed by: PSYCHIATRY & NEUROLOGY

## 2020-12-03 RX ORDER — BUSPIRONE HYDROCHLORIDE 30 MG/1
30 TABLET ORAL 2 TIMES DAILY
Qty: 60 TABLET | Refills: 1 | Status: SHIPPED | OUTPATIENT
Start: 2020-12-03 | End: 2021-07-23 | Stop reason: SDUPTHER

## 2020-12-03 RX ORDER — ESCITALOPRAM OXALATE 10 MG/1
10 TABLET ORAL DAILY
Qty: 30 TABLET | Refills: 2 | Status: SHIPPED | OUTPATIENT
Start: 2020-12-03 | End: 2021-04-22 | Stop reason: SDUPTHER

## 2020-12-03 RX ORDER — ZOLPIDEM TARTRATE 5 MG/1
TABLET ORAL
Qty: 30 TABLET | Refills: 1 | Status: SHIPPED | OUTPATIENT
Start: 2020-12-03 | End: 2022-05-02 | Stop reason: SDUPTHER
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
## 2021-03-03 ENCOUNTER — TELEPHONE (OUTPATIENT)
Dept: RADIOLOGY | Facility: HOSPITAL | Age: 51
End: 2021-03-03

## 2021-03-11 ENCOUNTER — HOSPITAL ENCOUNTER (OUTPATIENT)
Dept: RADIOLOGY | Facility: HOSPITAL | Age: 51
Discharge: HOME | End: 2021-03-11
Attending: INTERNAL MEDICINE
Payer: OTHER GOVERNMENT

## 2021-03-11 ENCOUNTER — TELEPHONE (OUTPATIENT)
Dept: RADIOLOGY | Facility: HOSPITAL | Age: 51
End: 2021-03-11

## 2021-03-11 DIAGNOSIS — Z01.89 ENCOUNTER FOR OTHER SPECIFIED SPECIAL EXAMINATIONS: ICD-10-CM

## 2021-03-11 PROCEDURE — 75571 CT HRT W/O DYE W/CA TEST: CPT | Mod: 26,,, | Performed by: RADIOLOGY

## 2021-03-11 PROCEDURE — 75571 CT CALCIUM SCORING CARDIAC: ICD-10-PCS | Mod: 26,,, | Performed by: RADIOLOGY

## 2021-03-11 PROCEDURE — 75571 CT HRT W/O DYE W/CA TEST: CPT | Mod: TC

## 2021-04-14 ENCOUNTER — TELEPHONE (OUTPATIENT)
Dept: PSYCHIATRY | Facility: CLINIC | Age: 51
End: 2021-04-14

## 2021-04-21 ENCOUNTER — TELEPHONE (OUTPATIENT)
Dept: RADIOLOGY | Facility: HOSPITAL | Age: 51
End: 2021-04-21

## 2021-04-22 ENCOUNTER — HOSPITAL ENCOUNTER (OUTPATIENT)
Dept: RADIOLOGY | Facility: HOSPITAL | Age: 51
Discharge: HOME | End: 2021-04-22
Attending: NURSE PRACTITIONER
Payer: OTHER GOVERNMENT

## 2021-04-22 DIAGNOSIS — R06.00 DYSPNEA: ICD-10-CM

## 2021-04-22 RX ORDER — ESCITALOPRAM OXALATE 10 MG/1
10 TABLET ORAL DAILY
Qty: 30 TABLET | Refills: 2 | Status: SHIPPED | OUTPATIENT
Start: 2021-04-22 | End: 2021-07-23 | Stop reason: SDUPTHER

## 2021-07-23 RX ORDER — ESCITALOPRAM OXALATE 10 MG/1
10 TABLET ORAL DAILY
Qty: 30 TABLET | Refills: 2 | Status: CANCELLED | OUTPATIENT
Start: 2021-07-23 | End: 2022-07-23

## 2021-07-23 RX ORDER — BUSPIRONE HYDROCHLORIDE 30 MG/1
30 TABLET ORAL 2 TIMES DAILY
Qty: 60 TABLET | Refills: 1 | Status: CANCELLED | OUTPATIENT
Start: 2021-07-23 | End: 2022-07-23

## 2021-07-23 RX ORDER — ZOLPIDEM TARTRATE 5 MG/1
TABLET ORAL
Qty: 30 TABLET | Refills: 1 | Status: CANCELLED | OUTPATIENT
Start: 2021-07-23

## 2021-07-26 RX ORDER — ESCITALOPRAM OXALATE 10 MG/1
10 TABLET ORAL DAILY
Qty: 30 TABLET | Refills: 1 | Status: SHIPPED | OUTPATIENT
Start: 2021-07-26 | End: 2021-07-26 | Stop reason: SDUPTHER

## 2021-07-26 RX ORDER — BUSPIRONE HYDROCHLORIDE 30 MG/1
30 TABLET ORAL 2 TIMES DAILY
Qty: 60 TABLET | Refills: 1 | Status: SHIPPED | OUTPATIENT
Start: 2021-07-26 | End: 2021-07-26 | Stop reason: SDUPTHER

## 2021-07-26 RX ORDER — ESCITALOPRAM OXALATE 10 MG/1
10 TABLET ORAL DAILY
Qty: 30 TABLET | Refills: 1 | Status: SHIPPED | OUTPATIENT
Start: 2021-07-26 | End: 2021-08-18

## 2021-07-26 RX ORDER — BUSPIRONE HYDROCHLORIDE 30 MG/1
30 TABLET ORAL 2 TIMES DAILY
Qty: 60 TABLET | Refills: 1 | Status: SHIPPED | OUTPATIENT
Start: 2021-07-26 | End: 2021-08-18 | Stop reason: SDUPTHER

## 2021-08-18 ENCOUNTER — OFFICE VISIT (OUTPATIENT)
Dept: PSYCHIATRY | Facility: CLINIC | Age: 51
End: 2021-08-18
Payer: OTHER GOVERNMENT

## 2021-08-18 DIAGNOSIS — G47.00 INSOMNIA, UNSPECIFIED TYPE: ICD-10-CM

## 2021-08-18 DIAGNOSIS — F41.9 ANXIETY: ICD-10-CM

## 2021-08-18 DIAGNOSIS — F33.1 MODERATE EPISODE OF RECURRENT MAJOR DEPRESSIVE DISORDER: Primary | ICD-10-CM

## 2021-08-18 PROCEDURE — 99214 PR OFFICE/OUTPT VISIT, EST, LEVL IV, 30-39 MIN: ICD-10-PCS | Mod: 95,,, | Performed by: PSYCHIATRY & NEUROLOGY

## 2021-08-18 PROCEDURE — 99214 OFFICE O/P EST MOD 30 MIN: CPT | Mod: 95,,, | Performed by: PSYCHIATRY & NEUROLOGY

## 2021-08-18 PROCEDURE — 99499 RISK ADDL DX/OHS AUDIT: ICD-10-PCS | Mod: 95,,, | Performed by: PSYCHIATRY & NEUROLOGY

## 2021-08-18 PROCEDURE — 99499 UNLISTED E&M SERVICE: CPT | Mod: 95,,, | Performed by: PSYCHIATRY & NEUROLOGY

## 2021-08-18 RX ORDER — BUPROPION HYDROCHLORIDE 150 MG/1
TABLET ORAL
Qty: 60 TABLET | Refills: 2 | Status: SHIPPED | OUTPATIENT
Start: 2021-08-18 | End: 2021-11-01

## 2021-08-18 RX ORDER — BUSPIRONE HYDROCHLORIDE 30 MG/1
30 TABLET ORAL 2 TIMES DAILY
Qty: 60 TABLET | Refills: 2 | Status: SHIPPED | OUTPATIENT
Start: 2021-08-18 | End: 2021-12-29 | Stop reason: SDUPTHER

## 2021-09-16 ENCOUNTER — PATIENT OUTREACH (OUTPATIENT)
Dept: ADMINISTRATIVE | Facility: HOSPITAL | Age: 51
End: 2021-09-16

## 2021-09-30 ENCOUNTER — OFFICE VISIT (OUTPATIENT)
Dept: PRIMARY CARE CLINIC | Facility: CLINIC | Age: 51
End: 2021-09-30
Payer: MEDICARE

## 2021-09-30 VITALS
HEART RATE: 76 BPM | SYSTOLIC BLOOD PRESSURE: 173 MMHG | BODY MASS INDEX: 32.69 KG/M2 | OXYGEN SATURATION: 99 % | HEIGHT: 62 IN | DIASTOLIC BLOOD PRESSURE: 91 MMHG | TEMPERATURE: 98 F | WEIGHT: 177.63 LBS

## 2021-09-30 DIAGNOSIS — K58.1 IRRITABLE BOWEL SYNDROME WITH CONSTIPATION: ICD-10-CM

## 2021-09-30 DIAGNOSIS — I10 ESSENTIAL HYPERTENSION: Primary | ICD-10-CM

## 2021-09-30 DIAGNOSIS — E78.2 MIXED HYPERLIPIDEMIA: ICD-10-CM

## 2021-09-30 PROCEDURE — 3008F BODY MASS INDEX DOCD: CPT | Mod: CPTII,S$GLB,, | Performed by: FAMILY MEDICINE

## 2021-09-30 PROCEDURE — 3077F SYST BP >= 140 MM HG: CPT | Mod: CPTII,S$GLB,, | Performed by: FAMILY MEDICINE

## 2021-09-30 PROCEDURE — 3080F PR MOST RECENT DIASTOLIC BLOOD PRESSURE >= 90 MM HG: ICD-10-PCS | Mod: CPTII,S$GLB,, | Performed by: FAMILY MEDICINE

## 2021-09-30 PROCEDURE — 1160F PR REVIEW ALL MEDS BY PRESCRIBER/CLIN PHARMACIST DOCUMENTED: ICD-10-PCS | Mod: CPTII,S$GLB,, | Performed by: FAMILY MEDICINE

## 2021-09-30 PROCEDURE — 1159F MED LIST DOCD IN RCRD: CPT | Mod: CPTII,S$GLB,, | Performed by: FAMILY MEDICINE

## 2021-09-30 PROCEDURE — 3080F DIAST BP >= 90 MM HG: CPT | Mod: CPTII,S$GLB,, | Performed by: FAMILY MEDICINE

## 2021-09-30 PROCEDURE — 3077F PR MOST RECENT SYSTOLIC BLOOD PRESSURE >= 140 MM HG: ICD-10-PCS | Mod: CPTII,S$GLB,, | Performed by: FAMILY MEDICINE

## 2021-09-30 PROCEDURE — 1160F RVW MEDS BY RX/DR IN RCRD: CPT | Mod: CPTII,S$GLB,, | Performed by: FAMILY MEDICINE

## 2021-09-30 PROCEDURE — 1159F PR MEDICATION LIST DOCUMENTED IN MEDICAL RECORD: ICD-10-PCS | Mod: CPTII,S$GLB,, | Performed by: FAMILY MEDICINE

## 2021-09-30 PROCEDURE — 99999 PR PBB SHADOW E&M-EST. PATIENT-LVL IV: ICD-10-PCS | Mod: PBBFAC,,, | Performed by: FAMILY MEDICINE

## 2021-09-30 PROCEDURE — 99204 PR OFFICE/OUTPT VISIT, NEW, LEVL IV, 45-59 MIN: ICD-10-PCS | Mod: S$GLB,,, | Performed by: FAMILY MEDICINE

## 2021-09-30 PROCEDURE — 99999 PR PBB SHADOW E&M-EST. PATIENT-LVL IV: CPT | Mod: PBBFAC,,, | Performed by: FAMILY MEDICINE

## 2021-09-30 PROCEDURE — 99204 OFFICE O/P NEW MOD 45 MIN: CPT | Mod: S$GLB,,, | Performed by: FAMILY MEDICINE

## 2021-09-30 PROCEDURE — 3008F PR BODY MASS INDEX (BMI) DOCUMENTED: ICD-10-PCS | Mod: CPTII,S$GLB,, | Performed by: FAMILY MEDICINE

## 2021-09-30 RX ORDER — FLUTICASONE PROPIONATE 50 MCG
1 SPRAY, SUSPENSION (ML) NASAL
COMMUNITY
Start: 2021-03-22 | End: 2022-03-22

## 2021-09-30 RX ORDER — AMLODIPINE BESYLATE 5 MG/1
10 TABLET ORAL DAILY
Qty: 60 TABLET | Refills: 1 | Status: SHIPPED | OUTPATIENT
Start: 2021-09-30 | End: 2021-10-20

## 2021-10-20 ENCOUNTER — OFFICE VISIT (OUTPATIENT)
Dept: PRIMARY CARE CLINIC | Facility: CLINIC | Age: 51
End: 2021-10-20
Payer: MEDICARE

## 2021-10-20 DIAGNOSIS — I10 ESSENTIAL HYPERTENSION: Primary | ICD-10-CM

## 2021-10-20 PROCEDURE — 99213 OFFICE O/P EST LOW 20 MIN: CPT | Mod: 95,,, | Performed by: NURSE PRACTITIONER

## 2021-10-20 PROCEDURE — 4010F PR ACE/ARB THEARPY RXD/TAKEN: ICD-10-PCS | Mod: CPTII,95,, | Performed by: NURSE PRACTITIONER

## 2021-10-20 PROCEDURE — 99213 PR OFFICE/OUTPT VISIT, EST, LEVL III, 20-29 MIN: ICD-10-PCS | Mod: 95,,, | Performed by: NURSE PRACTITIONER

## 2021-10-20 PROCEDURE — 4010F ACE/ARB THERAPY RXD/TAKEN: CPT | Mod: CPTII,95,, | Performed by: NURSE PRACTITIONER

## 2021-10-20 RX ORDER — LOSARTAN POTASSIUM 50 MG/1
50 TABLET ORAL DAILY
Qty: 90 TABLET | Refills: 3 | Status: SHIPPED | OUTPATIENT
Start: 2021-10-20 | End: 2022-12-28 | Stop reason: SDUPTHER

## 2021-10-24 DIAGNOSIS — K21.9 GASTROESOPHAGEAL REFLUX DISEASE, ESOPHAGITIS PRESENCE NOT SPECIFIED: ICD-10-CM

## 2021-10-24 RX ORDER — OMEPRAZOLE 40 MG/1
40 CAPSULE, DELAYED RELEASE ORAL DAILY
Qty: 30 CAPSULE | Refills: 11 | OUTPATIENT
Start: 2021-10-24 | End: 2022-10-24

## 2021-10-25 ENCOUNTER — LAB VISIT (OUTPATIENT)
Dept: LAB | Facility: HOSPITAL | Age: 51
End: 2021-10-25
Attending: FAMILY MEDICINE
Payer: MEDICARE

## 2021-10-25 DIAGNOSIS — E78.2 MIXED HYPERLIPIDEMIA: ICD-10-CM

## 2021-10-25 DIAGNOSIS — I10 ESSENTIAL HYPERTENSION: ICD-10-CM

## 2021-10-25 PROCEDURE — 36415 COLL VENOUS BLD VENIPUNCTURE: CPT | Performed by: FAMILY MEDICINE

## 2021-10-25 PROCEDURE — 80061 LIPID PANEL: CPT | Performed by: FAMILY MEDICINE

## 2021-10-25 PROCEDURE — 85025 COMPLETE CBC W/AUTO DIFF WBC: CPT | Performed by: FAMILY MEDICINE

## 2021-10-25 PROCEDURE — 80053 COMPREHEN METABOLIC PANEL: CPT | Performed by: FAMILY MEDICINE

## 2021-10-26 LAB
ALBUMIN SERPL BCP-MCNC: 3.4 G/DL (ref 3.5–5.2)
ALP SERPL-CCNC: 62 U/L (ref 55–135)
ALT SERPL W/O P-5'-P-CCNC: 28 U/L (ref 10–44)
ANION GAP SERPL CALC-SCNC: 7 MMOL/L (ref 8–16)
AST SERPL-CCNC: 27 U/L (ref 10–40)
BASOPHILS # BLD AUTO: 0.03 K/UL (ref 0–0.2)
BASOPHILS NFR BLD: 0.7 % (ref 0–1.9)
BILIRUB SERPL-MCNC: 0.5 MG/DL (ref 0.1–1)
BUN SERPL-MCNC: 12 MG/DL (ref 6–20)
CALCIUM SERPL-MCNC: 9.1 MG/DL (ref 8.7–10.5)
CHLORIDE SERPL-SCNC: 104 MMOL/L (ref 95–110)
CHOLEST SERPL-MCNC: 202 MG/DL (ref 120–199)
CHOLEST/HDLC SERPL: 5.1 {RATIO} (ref 2–5)
CO2 SERPL-SCNC: 29 MMOL/L (ref 23–29)
CREAT SERPL-MCNC: 0.8 MG/DL (ref 0.5–1.4)
DIFFERENTIAL METHOD: ABNORMAL
EOSINOPHIL # BLD AUTO: 0.2 K/UL (ref 0–0.5)
EOSINOPHIL NFR BLD: 4.3 % (ref 0–8)
ERYTHROCYTE [DISTWIDTH] IN BLOOD BY AUTOMATED COUNT: 13 % (ref 11.5–14.5)
EST. GFR  (AFRICAN AMERICAN): >60 ML/MIN/1.73 M^2
EST. GFR  (NON AFRICAN AMERICAN): >60 ML/MIN/1.73 M^2
GLUCOSE SERPL-MCNC: 86 MG/DL (ref 70–110)
HCT VFR BLD AUTO: 36.2 % (ref 37–48.5)
HDLC SERPL-MCNC: 40 MG/DL (ref 40–75)
HDLC SERPL: 19.8 % (ref 20–50)
HGB BLD-MCNC: 12.1 G/DL (ref 12–16)
IMM GRANULOCYTES # BLD AUTO: 0.01 K/UL (ref 0–0.04)
IMM GRANULOCYTES NFR BLD AUTO: 0.2 % (ref 0–0.5)
LDLC SERPL CALC-MCNC: 140.6 MG/DL (ref 63–159)
LYMPHOCYTES # BLD AUTO: 2.2 K/UL (ref 1–4.8)
LYMPHOCYTES NFR BLD: 48.6 % (ref 18–48)
MCH RBC QN AUTO: 30 PG (ref 27–31)
MCHC RBC AUTO-ENTMCNC: 33.4 G/DL (ref 32–36)
MCV RBC AUTO: 90 FL (ref 82–98)
MONOCYTES # BLD AUTO: 0.4 K/UL (ref 0.3–1)
MONOCYTES NFR BLD: 9.5 % (ref 4–15)
NEUTROPHILS # BLD AUTO: 1.6 K/UL (ref 1.8–7.7)
NEUTROPHILS NFR BLD: 36.7 % (ref 38–73)
NONHDLC SERPL-MCNC: 162 MG/DL
NRBC BLD-RTO: 0 /100 WBC
PLATELET # BLD AUTO: 232 K/UL (ref 150–450)
PMV BLD AUTO: 10.6 FL (ref 9.2–12.9)
POTASSIUM SERPL-SCNC: 3.3 MMOL/L (ref 3.5–5.1)
PROT SERPL-MCNC: 7.4 G/DL (ref 6–8.4)
RBC # BLD AUTO: 4.03 M/UL (ref 4–5.4)
SODIUM SERPL-SCNC: 140 MMOL/L (ref 136–145)
TRIGL SERPL-MCNC: 107 MG/DL (ref 30–150)
WBC # BLD AUTO: 4.44 K/UL (ref 3.9–12.7)

## 2021-11-01 ENCOUNTER — PATIENT MESSAGE (OUTPATIENT)
Dept: PSYCHIATRY | Facility: CLINIC | Age: 51
End: 2021-11-01
Payer: MEDICARE

## 2021-11-01 RX ORDER — FLUVOXAMINE MALEATE 50 MG/1
50 TABLET ORAL NIGHTLY
Qty: 30 TABLET | Refills: 2 | Status: SHIPPED | OUTPATIENT
Start: 2021-11-01 | End: 2022-04-07

## 2021-11-02 RX ORDER — BUPROPION HYDROCHLORIDE 150 MG/1
TABLET ORAL
Qty: 60 TABLET | Refills: 2 | OUTPATIENT
Start: 2021-11-02

## 2021-11-09 ENCOUNTER — OFFICE VISIT (OUTPATIENT)
Dept: PRIMARY CARE CLINIC | Facility: CLINIC | Age: 51
End: 2021-11-09
Payer: MEDICARE

## 2021-11-09 VITALS
DIASTOLIC BLOOD PRESSURE: 81 MMHG | WEIGHT: 181.38 LBS | TEMPERATURE: 98 F | OXYGEN SATURATION: 97 % | HEIGHT: 62 IN | BODY MASS INDEX: 33.38 KG/M2 | SYSTOLIC BLOOD PRESSURE: 110 MMHG | HEART RATE: 87 BPM

## 2021-11-09 DIAGNOSIS — E65 PANNICULUS: Primary | ICD-10-CM

## 2021-11-09 DIAGNOSIS — R52 ACUTE PAIN: ICD-10-CM

## 2021-11-09 DIAGNOSIS — K58.1 IRRITABLE BOWEL SYNDROME WITH CONSTIPATION: ICD-10-CM

## 2021-11-09 DIAGNOSIS — Z12.31 BREAST CANCER SCREENING BY MAMMOGRAM: ICD-10-CM

## 2021-11-09 DIAGNOSIS — E87.6 HYPOKALEMIA: ICD-10-CM

## 2021-11-09 DIAGNOSIS — Z01.818 PRE-OP EVALUATION: ICD-10-CM

## 2021-11-09 DIAGNOSIS — B37.9 YEAST INFECTION: ICD-10-CM

## 2021-11-09 PROCEDURE — 1160F RVW MEDS BY RX/DR IN RCRD: CPT | Mod: CPTII,S$GLB,, | Performed by: FAMILY MEDICINE

## 2021-11-09 PROCEDURE — 93010 ELECTROCARDIOGRAM REPORT: CPT | Mod: S$GLB,,, | Performed by: INTERNAL MEDICINE

## 2021-11-09 PROCEDURE — 1159F PR MEDICATION LIST DOCUMENTED IN MEDICAL RECORD: ICD-10-PCS | Mod: CPTII,S$GLB,, | Performed by: FAMILY MEDICINE

## 2021-11-09 PROCEDURE — 3074F PR MOST RECENT SYSTOLIC BLOOD PRESSURE < 130 MM HG: ICD-10-PCS | Mod: CPTII,S$GLB,, | Performed by: FAMILY MEDICINE

## 2021-11-09 PROCEDURE — 4010F PR ACE/ARB THEARPY RXD/TAKEN: ICD-10-PCS | Mod: CPTII,S$GLB,, | Performed by: FAMILY MEDICINE

## 2021-11-09 PROCEDURE — 3079F DIAST BP 80-89 MM HG: CPT | Mod: CPTII,S$GLB,, | Performed by: FAMILY MEDICINE

## 2021-11-09 PROCEDURE — 99999 PR PBB SHADOW E&M-EST. PATIENT-LVL V: ICD-10-PCS | Mod: PBBFAC,,, | Performed by: FAMILY MEDICINE

## 2021-11-09 PROCEDURE — 3074F SYST BP LT 130 MM HG: CPT | Mod: CPTII,S$GLB,, | Performed by: FAMILY MEDICINE

## 2021-11-09 PROCEDURE — 99214 PR OFFICE/OUTPT VISIT, EST, LEVL IV, 30-39 MIN: ICD-10-PCS | Mod: S$GLB,,, | Performed by: FAMILY MEDICINE

## 2021-11-09 PROCEDURE — 4010F ACE/ARB THERAPY RXD/TAKEN: CPT | Mod: CPTII,S$GLB,, | Performed by: FAMILY MEDICINE

## 2021-11-09 PROCEDURE — 93005 EKG 12-LEAD: ICD-10-PCS | Mod: S$GLB,,, | Performed by: FAMILY MEDICINE

## 2021-11-09 PROCEDURE — 93010 EKG 12-LEAD: ICD-10-PCS | Mod: S$GLB,,, | Performed by: INTERNAL MEDICINE

## 2021-11-09 PROCEDURE — 99999 PR PBB SHADOW E&M-EST. PATIENT-LVL V: CPT | Mod: PBBFAC,,, | Performed by: FAMILY MEDICINE

## 2021-11-09 PROCEDURE — 1159F MED LIST DOCD IN RCRD: CPT | Mod: CPTII,S$GLB,, | Performed by: FAMILY MEDICINE

## 2021-11-09 PROCEDURE — 99214 OFFICE O/P EST MOD 30 MIN: CPT | Mod: S$GLB,,, | Performed by: FAMILY MEDICINE

## 2021-11-09 PROCEDURE — 1160F PR REVIEW ALL MEDS BY PRESCRIBER/CLIN PHARMACIST DOCUMENTED: ICD-10-PCS | Mod: CPTII,S$GLB,, | Performed by: FAMILY MEDICINE

## 2021-11-09 PROCEDURE — 3008F PR BODY MASS INDEX (BMI) DOCUMENTED: ICD-10-PCS | Mod: CPTII,S$GLB,, | Performed by: FAMILY MEDICINE

## 2021-11-09 PROCEDURE — 93005 ELECTROCARDIOGRAM TRACING: CPT | Mod: S$GLB,,, | Performed by: FAMILY MEDICINE

## 2021-11-09 PROCEDURE — 3079F PR MOST RECENT DIASTOLIC BLOOD PRESSURE 80-89 MM HG: ICD-10-PCS | Mod: CPTII,S$GLB,, | Performed by: FAMILY MEDICINE

## 2021-11-09 PROCEDURE — 3008F BODY MASS INDEX DOCD: CPT | Mod: CPTII,S$GLB,, | Performed by: FAMILY MEDICINE

## 2021-11-09 RX ORDER — IBUPROFEN 800 MG/1
800 TABLET ORAL EVERY 8 HOURS PRN
Qty: 30 TABLET | Refills: 0 | Status: SHIPPED | OUTPATIENT
Start: 2021-11-09 | End: 2021-11-19

## 2021-11-09 RX ORDER — POTASSIUM CHLORIDE 750 MG/1
10 CAPSULE, EXTENDED RELEASE ORAL 2 TIMES DAILY
Qty: 60 CAPSULE | Refills: 2 | Status: SHIPPED | OUTPATIENT
Start: 2021-11-09 | End: 2022-05-17 | Stop reason: SDUPTHER

## 2021-11-09 RX ORDER — FLUCONAZOLE 150 MG/1
150 TABLET ORAL
Qty: 3 TABLET | Refills: 0 | Status: SHIPPED | OUTPATIENT
Start: 2021-11-09 | End: 2021-11-18

## 2021-12-29 RX ORDER — BUSPIRONE HYDROCHLORIDE 30 MG/1
30 TABLET ORAL 2 TIMES DAILY
Qty: 60 TABLET | Refills: 1 | Status: SHIPPED | OUTPATIENT
Start: 2021-12-29 | End: 2022-05-02 | Stop reason: SDUPTHER

## 2022-01-03 ENCOUNTER — PATIENT MESSAGE (OUTPATIENT)
Dept: PSYCHIATRY | Facility: CLINIC | Age: 52
End: 2022-01-03
Payer: MEDICARE

## 2022-01-07 ENCOUNTER — HOSPITAL ENCOUNTER (OUTPATIENT)
Dept: RADIOLOGY | Facility: HOSPITAL | Age: 52
Discharge: HOME | End: 2022-01-07
Attending: FAMILY MEDICINE
Payer: MEDICARE

## 2022-01-07 DIAGNOSIS — Z12.31 BREAST CANCER SCREENING BY MAMMOGRAM: ICD-10-CM

## 2022-01-07 PROCEDURE — 77067 SCR MAMMO BI INCL CAD: CPT | Mod: TC

## 2022-01-07 PROCEDURE — 77067 SCR MAMMO BI INCL CAD: CPT | Mod: 26,,, | Performed by: RADIOLOGY

## 2022-01-07 PROCEDURE — 77063 BREAST TOMOSYNTHESIS BI: CPT | Mod: 26,,, | Performed by: RADIOLOGY

## 2022-01-07 PROCEDURE — 77063 BREAST TOMOSYNTHESIS BI: CPT | Mod: TC

## 2022-01-07 PROCEDURE — 77067 MAMMO DIGITAL SCREENING BILAT WITH TOMO: ICD-10-PCS | Mod: 26,,, | Performed by: RADIOLOGY

## 2022-01-07 PROCEDURE — 77063 MAMMO DIGITAL SCREENING BILAT WITH TOMO: ICD-10-PCS | Mod: 26,,, | Performed by: RADIOLOGY

## 2022-01-10 ENCOUNTER — PATIENT MESSAGE (OUTPATIENT)
Dept: PRIMARY CARE CLINIC | Facility: CLINIC | Age: 52
End: 2022-01-10
Payer: MEDICARE

## 2022-01-10 DIAGNOSIS — N63.20 BREAST MASS, LEFT: Primary | ICD-10-CM

## 2022-01-10 DIAGNOSIS — R92.8 ABNORMAL MAMMOGRAPHY: ICD-10-CM

## 2022-01-10 NOTE — PROGRESS NOTES
I have reviewed the result of your mammogram  Right breast is Normal  Left breast is Abnormal  You will need diagnostic mammogram with US for further study which should have been scheduled by the radiology department. If you do not have an appointment, please notify our clinic.

## 2022-01-11 ENCOUNTER — HOSPITAL ENCOUNTER (OUTPATIENT)
Dept: RADIOLOGY | Facility: HOSPITAL | Age: 52
Discharge: HOME | End: 2022-01-11
Attending: FAMILY MEDICINE
Payer: MEDICARE

## 2022-01-11 ENCOUNTER — PATIENT MESSAGE (OUTPATIENT)
Dept: PRIMARY CARE CLINIC | Facility: CLINIC | Age: 52
End: 2022-01-11
Payer: MEDICARE

## 2022-01-11 DIAGNOSIS — N63.20 BREAST MASS, LEFT: ICD-10-CM

## 2022-01-11 DIAGNOSIS — R92.8 ABNORMAL MAMMOGRAPHY: ICD-10-CM

## 2022-01-11 PROCEDURE — 77065 DX MAMMO INCL CAD UNI: CPT | Mod: 26,LT,, | Performed by: RADIOLOGY

## 2022-01-11 PROCEDURE — 76642 ULTRASOUND BREAST LIMITED: CPT | Mod: 26,LT,, | Performed by: RADIOLOGY

## 2022-01-11 PROCEDURE — 76642 ULTRASOUND BREAST LIMITED: CPT | Mod: TC,LT

## 2022-01-11 PROCEDURE — 77065 MAMMO DIGITAL DIAGNOSTIC LEFT WITH TOMO: ICD-10-PCS | Mod: 26,LT,, | Performed by: RADIOLOGY

## 2022-01-11 PROCEDURE — 77061 BREAST TOMOSYNTHESIS UNI: CPT | Mod: 26,LT,, | Performed by: RADIOLOGY

## 2022-01-11 PROCEDURE — 77061 MAMMO DIGITAL DIAGNOSTIC LEFT WITH TOMO: ICD-10-PCS | Mod: 26,LT,, | Performed by: RADIOLOGY

## 2022-01-11 PROCEDURE — 76642 US BREAST LEFT LIMITED: ICD-10-PCS | Mod: 26,LT,, | Performed by: RADIOLOGY

## 2022-01-11 PROCEDURE — 77065 DX MAMMO INCL CAD UNI: CPT | Mod: TC,LT

## 2022-01-14 NOTE — PROGRESS NOTES
Your diagnostic mammogram is negative which is a good result. you are due for another mammo this time next year. Thank you.

## 2022-01-28 DIAGNOSIS — Z91.013 SHELLFISH ALLERGY: ICD-10-CM

## 2022-01-28 DIAGNOSIS — Z91.010 PEANUT ALLERGY: ICD-10-CM

## 2022-01-31 RX ORDER — EPINEPHRINE 0.3 MG/.3ML
INJECTION SUBCUTANEOUS
Qty: 2 EACH | Refills: 6 | OUTPATIENT
Start: 2022-01-31

## 2022-02-09 ENCOUNTER — PATIENT MESSAGE (OUTPATIENT)
Dept: PRIMARY CARE CLINIC | Facility: CLINIC | Age: 52
End: 2022-02-09
Payer: MEDICARE

## 2022-02-11 RX ORDER — HYDROCHLOROTHIAZIDE 25 MG/1
25 TABLET ORAL DAILY
Qty: 30 TABLET | Refills: 6 | OUTPATIENT
Start: 2022-02-11

## 2022-02-15 ENCOUNTER — PATIENT MESSAGE (OUTPATIENT)
Dept: PRIMARY CARE CLINIC | Facility: CLINIC | Age: 52
End: 2022-02-15
Payer: MEDICARE

## 2022-02-15 ENCOUNTER — PATIENT MESSAGE (OUTPATIENT)
Dept: PSYCHIATRY | Facility: CLINIC | Age: 52
End: 2022-02-15
Payer: MEDICARE

## 2022-02-15 RX ORDER — VILAZODONE HYDROCHLORIDE 20 MG/1
TABLET ORAL
Qty: 30 TABLET | Refills: 2 | Status: SHIPPED | OUTPATIENT
Start: 2022-02-15 | End: 2022-02-16 | Stop reason: SDUPTHER

## 2022-02-16 DIAGNOSIS — I10 ESSENTIAL HYPERTENSION: Primary | ICD-10-CM

## 2022-02-16 RX ORDER — HYDROCHLOROTHIAZIDE 25 MG/1
25 TABLET ORAL DAILY
Qty: 30 TABLET | Refills: 6 | Status: SHIPPED | OUTPATIENT
Start: 2022-02-16 | End: 2022-02-16 | Stop reason: SDUPTHER

## 2022-02-16 RX ORDER — VILAZODONE HYDROCHLORIDE 20 MG/1
TABLET ORAL
Qty: 30 TABLET | Refills: 2 | Status: SHIPPED | OUTPATIENT
Start: 2022-02-16 | End: 2022-05-02 | Stop reason: SDUPTHER

## 2022-02-16 RX ORDER — HYDROCHLOROTHIAZIDE 25 MG/1
25 TABLET ORAL DAILY
Qty: 90 TABLET | Refills: 1 | Status: SHIPPED | OUTPATIENT
Start: 2022-02-16 | End: 2022-09-26 | Stop reason: SDUPTHER

## 2022-02-21 ENCOUNTER — PATIENT MESSAGE (OUTPATIENT)
Dept: PSYCHIATRY | Facility: CLINIC | Age: 52
End: 2022-02-21
Payer: MEDICARE

## 2022-02-21 RX ORDER — VILAZODONE HYDROCHLORIDE 20 MG/1
TABLET ORAL
Qty: 30 TABLET | Refills: 2 | Status: SHIPPED | OUTPATIENT
Start: 2022-02-21 | End: 2022-05-02

## 2022-04-04 ENCOUNTER — PES CALL (OUTPATIENT)
Dept: ADMINISTRATIVE | Facility: CLINIC | Age: 52
End: 2022-04-04
Payer: MEDICARE

## 2022-04-05 ENCOUNTER — TELEPHONE (OUTPATIENT)
Dept: ADMINISTRATIVE | Facility: CLINIC | Age: 52
End: 2022-04-05
Payer: MEDICARE

## 2022-04-07 ENCOUNTER — OFFICE VISIT (OUTPATIENT)
Dept: HOME HEALTH SERVICES | Facility: CLINIC | Age: 52
End: 2022-04-07
Payer: MEDICARE

## 2022-04-07 ENCOUNTER — TELEPHONE (OUTPATIENT)
Dept: ADMINISTRATIVE | Facility: CLINIC | Age: 52
End: 2022-04-07
Payer: MEDICARE

## 2022-04-07 DIAGNOSIS — Z87.410 HISTORY OF CERVICAL DYSPLASIA: ICD-10-CM

## 2022-04-07 DIAGNOSIS — C64.9 RENAL CELL CARCINOMA, UNSPECIFIED LATERALITY: ICD-10-CM

## 2022-04-07 DIAGNOSIS — R10.13 EPIGASTRIC ABDOMINAL PAIN: ICD-10-CM

## 2022-04-07 DIAGNOSIS — N94.10 DYSPAREUNIA, FEMALE: ICD-10-CM

## 2022-04-07 DIAGNOSIS — N95.1 MENOPAUSAL SYNDROME (HOT FLASHES): ICD-10-CM

## 2022-04-07 DIAGNOSIS — Z00.00 ENCOUNTER FOR PREVENTIVE HEALTH EXAMINATION: ICD-10-CM

## 2022-04-07 DIAGNOSIS — F51.04 PSYCHOPHYSIOLOGICAL INSOMNIA: ICD-10-CM

## 2022-04-07 DIAGNOSIS — F33.0 MAJOR DEPRESSIVE DISORDER, RECURRENT, MILD: Primary | ICD-10-CM

## 2022-04-07 DIAGNOSIS — I10 HYPERTENSION GOAL BP (BLOOD PRESSURE) < 140/90: ICD-10-CM

## 2022-04-07 DIAGNOSIS — Z84.1 FAMILY HISTORY OF NEPHROLITHIASIS: ICD-10-CM

## 2022-04-07 DIAGNOSIS — K58.1 IRRITABLE BOWEL SYNDROME WITH CONSTIPATION: Chronic | ICD-10-CM

## 2022-04-07 DIAGNOSIS — F32.0 CURRENT MILD EPISODE OF MAJOR DEPRESSIVE DISORDER, UNSPECIFIED WHETHER RECURRENT: ICD-10-CM

## 2022-04-07 DIAGNOSIS — E87.6 HYPOKALEMIA: Chronic | ICD-10-CM

## 2022-04-07 PROCEDURE — G0439 PPPS, SUBSEQ VISIT: HCPCS | Mod: 95,,, | Performed by: NURSE PRACTITIONER

## 2022-04-07 PROCEDURE — 4010F ACE/ARB THERAPY RXD/TAKEN: CPT | Mod: CPTII,95,, | Performed by: NURSE PRACTITIONER

## 2022-04-07 PROCEDURE — 1159F MED LIST DOCD IN RCRD: CPT | Mod: CPTII,95,, | Performed by: NURSE PRACTITIONER

## 2022-04-07 PROCEDURE — G0439 PR MEDICARE ANNUAL WELLNESS SUBSEQUENT VISIT: ICD-10-PCS | Mod: 95,,, | Performed by: NURSE PRACTITIONER

## 2022-04-07 PROCEDURE — 1160F PR REVIEW ALL MEDS BY PRESCRIBER/CLIN PHARMACIST DOCUMENTED: ICD-10-PCS | Mod: CPTII,95,, | Performed by: NURSE PRACTITIONER

## 2022-04-07 PROCEDURE — 99499 UNLISTED E&M SERVICE: CPT | Mod: 95,,, | Performed by: NURSE PRACTITIONER

## 2022-04-07 PROCEDURE — 4010F PR ACE/ARB THEARPY RXD/TAKEN: ICD-10-PCS | Mod: CPTII,95,, | Performed by: NURSE PRACTITIONER

## 2022-04-07 PROCEDURE — 99499 RISK ADDL DX/OHS AUDIT: ICD-10-PCS | Mod: 95,,, | Performed by: NURSE PRACTITIONER

## 2022-04-07 PROCEDURE — 1160F RVW MEDS BY RX/DR IN RCRD: CPT | Mod: CPTII,95,, | Performed by: NURSE PRACTITIONER

## 2022-04-07 PROCEDURE — 1159F PR MEDICATION LIST DOCUMENTED IN MEDICAL RECORD: ICD-10-PCS | Mod: CPTII,95,, | Performed by: NURSE PRACTITIONER

## 2022-04-07 NOTE — PATIENT INSTRUCTIONS
Counseling and Referral of Other Preventative  (Italic type indicates deductible and co-insurance are waived)    Patient Name: Irasema Vang  Today's Date: 4/7/2022    Health Maintenance       Date Due Completion Date    Pneumococcal Vaccines (Age 0-64) (2 of 4 - PPSV23) 02/15/2019 12/21/2018    Shingles Vaccine (1 of 2) Never done ---    Mammogram 01/11/2023 1/11/2022    Colorectal Cancer Screening 05/17/2023 5/17/2013    Lipid Panel 10/25/2026 10/25/2021    TETANUS VACCINE 12/27/2028 12/27/2018        No orders of the defined types were placed in this encounter.    The following information is provided to all patients.  This information is to help you find resources for any of the problems found today that may be affecting your health:                Living healthy guide: www.[facility].[place].gov      Understanding Diabetes: www.diabetes.org      Eating healthy: www.cdc.gov/healthyweight      CDC home safety checklist: www.cdc.gov/steadi/patient.html      Agency on Aging: www.goea.[place].[facility]      Alcoholics anonymous (AA): www.aa.org      Physical Activity: www.taz.nih.gov/zl9fjls      Tobacco use: www.quitwithusla.org

## 2022-04-07 NOTE — PROGRESS NOTES
The patient location is: [place]  The chief complaint leading to consultation is: awv    Visit type: audiovisual    Face to Face time with patient: 60  60 minutes of total time spent on the encounter, which includes face to face time and non-face to face time preparing to see the patient (eg, review of tests), Obtaining and/or reviewing separately obtained history, Documenting clinical information in the electronic or other health record, Independently interpreting results (not separately reported) and communicating results to the patient/family/caregiver, or Care coordination (not separately reported).         Each patient to whom he or she provides medical services by telemedicine is:  (1) informed of the relationship between the physician and patient and the respective role of any other health care provider with respect to management of the patient; and (2) notified that he or she may decline to receive medical services by telemedicine and may withdraw from such care at any time.    Notes:       Irasema Vang presented for a  Medicare AWV and comprehensive Health Risk Assessment today. The following components were reviewed and updated:    · Medical history  · Family History  · Social history  · Allergies and Current Medications  · Health Risk Assessment  · Health Maintenance  · Care Team         ** See Completed Assessments for Annual Wellness Visit within the encounter summary.**         The following assessments were completed:  · Living Situation  · CAGE  · Depression Screening  · Fall Risk Assessment (MACH 10)  · Hearing Assessment(HHI)  · Cognitive Function Screening  · Nutrition Screening  · ADL Screening  · PAQ Screening      There were no vitals filed for this visit.  There is no height or weight on file to calculate BMI.  Physical Exam  Constitutional:       Appearance: Normal appearance.   Neurological:      Mental Status: She is alert.   Psychiatric:         Attention and Perception: Attention and  perception normal.         Mood and Affect: Mood and affect normal.         Speech: Speech normal.         Behavior: Behavior normal. Behavior is cooperative.         Thought Content: Thought content normal.         Cognition and Memory: Cognition normal. She exhibits impaired recent memory.         Judgment: Judgment normal.               Diagnoses and health risks identified today and associated recommendations/orders:    1. Major depressive disorder, recurrent, mild  Stable, followed by provider.     2. Renal cell carcinoma, unspecified laterality  Stable, followed by provider.     3. Encounter for preventive health examination  Stable, followed by provider.     4. Current mild episode of major depressive disorder, unspecified whether recurrent  Stable, followed by provider.     5. Hypertension goal BP (blood pressure) < 140/90  Stable, followed by provider.     6. Hypokalemia  Stable, followed by provider.     7. History of cervical dysplasia  Stable, followed by provider.     8. Menopausal syndrome (hot flashes)  Stable, followed by provider.     9. Dyspareunia, female  Stable, followed by provider.     10. Irritable bowel syndrome with constipation  Stable, followed by provider.     11. Epigastric abdominal pain  Stable, followed by provider.     12. Family history of nephrolithiasis  Stable, followed by provider.     13. Psychophysiological insomnia  Stable, followed by provider.       Provided Irasema with a 5-10 year written screening schedule and personal prevention plan. Recommendations were developed using the USPSTF age appropriate recommendations. Education, counseling, and referrals were provided as needed. After Visit Summary printed and given to patient which includes a list of additional screenings\tests needed.    Follow up in about 1 year (around 4/7/2023) for awv.    Tirso Molina NP  I offered to discuss advanced care planning, including how to pick a person who would make decisions for you if  you were unable to make them for yourself, called a health care power of , and what kind of decisions you might make such as use of life sustaining treatments such as ventilators and tube feeding when faced with a life limiting illness recorded on a living will that they will need to know. (How you want to be cared for as you near the end of your natural life)     X Patient is interested in learning more about how to make advanced directives.  I provided them paperwork and offered to discuss this with them.

## 2022-04-19 ENCOUNTER — PATIENT MESSAGE (OUTPATIENT)
Dept: PRIMARY CARE CLINIC | Facility: CLINIC | Age: 52
End: 2022-04-19
Payer: MEDICARE

## 2022-04-19 ENCOUNTER — TELEPHONE (OUTPATIENT)
Dept: PRIMARY CARE CLINIC | Facility: CLINIC | Age: 52
End: 2022-04-19
Payer: MEDICARE

## 2022-04-19 ENCOUNTER — PATIENT MESSAGE (OUTPATIENT)
Dept: PSYCHIATRY | Facility: CLINIC | Age: 52
End: 2022-04-19
Payer: MEDICARE

## 2022-04-19 NOTE — TELEPHONE ENCOUNTER
Returned call to patient regarding Nausea patient stated she went to Urgent Care and will schedule an appointment when needed.

## 2022-04-28 ENCOUNTER — TELEPHONE (OUTPATIENT)
Dept: PSYCHIATRY | Facility: CLINIC | Age: 52
End: 2022-04-28
Payer: MEDICARE

## 2022-05-02 ENCOUNTER — OFFICE VISIT (OUTPATIENT)
Dept: PSYCHIATRY | Facility: CLINIC | Age: 52
End: 2022-05-02
Payer: COMMERCIAL

## 2022-05-02 DIAGNOSIS — G47.00 INSOMNIA, UNSPECIFIED TYPE: ICD-10-CM

## 2022-05-02 DIAGNOSIS — Z91.013 SHELLFISH ALLERGY: ICD-10-CM

## 2022-05-02 DIAGNOSIS — F33.0 MDD (MAJOR DEPRESSIVE DISORDER), RECURRENT EPISODE, MILD: Primary | ICD-10-CM

## 2022-05-02 DIAGNOSIS — F41.9 ANXIETY: ICD-10-CM

## 2022-05-02 DIAGNOSIS — Z91.010 PEANUT ALLERGY: ICD-10-CM

## 2022-05-02 PROCEDURE — 90833 PR PSYCHOTHERAPY W/PATIENT W/E&M, 30 MIN (ADD ON): ICD-10-PCS | Mod: S$GLB,,, | Performed by: PSYCHIATRY & NEUROLOGY

## 2022-05-02 PROCEDURE — 99214 PR OFFICE/OUTPT VISIT, EST, LEVL IV, 30-39 MIN: ICD-10-PCS | Mod: S$GLB,,, | Performed by: PSYCHIATRY & NEUROLOGY

## 2022-05-02 PROCEDURE — 99499 RISK ADDL DX/OHS AUDIT: ICD-10-PCS | Mod: S$GLB,,, | Performed by: PSYCHIATRY & NEUROLOGY

## 2022-05-02 PROCEDURE — 90833 PSYTX W PT W E/M 30 MIN: CPT | Mod: S$GLB,,, | Performed by: PSYCHIATRY & NEUROLOGY

## 2022-05-02 PROCEDURE — 99213 OFFICE O/P EST LOW 20 MIN: CPT | Mod: PBBFAC | Performed by: PSYCHIATRY & NEUROLOGY

## 2022-05-02 PROCEDURE — 99999 PR PBB SHADOW E&M-EST. PATIENT-LVL III: ICD-10-PCS | Mod: PBBFAC,,, | Performed by: PSYCHIATRY & NEUROLOGY

## 2022-05-02 PROCEDURE — 99499 UNLISTED E&M SERVICE: CPT | Mod: S$GLB,,, | Performed by: PSYCHIATRY & NEUROLOGY

## 2022-05-02 PROCEDURE — 99999 PR PBB SHADOW E&M-EST. PATIENT-LVL III: CPT | Mod: PBBFAC,,, | Performed by: PSYCHIATRY & NEUROLOGY

## 2022-05-02 PROCEDURE — 99214 OFFICE O/P EST MOD 30 MIN: CPT | Mod: S$GLB,,, | Performed by: PSYCHIATRY & NEUROLOGY

## 2022-05-02 RX ORDER — VILAZODONE HYDROCHLORIDE 20 MG/1
20 TABLET ORAL DAILY
Qty: 30 TABLET | Refills: 2 | Status: SHIPPED | OUTPATIENT
Start: 2022-05-02 | End: 2022-05-02 | Stop reason: SDUPTHER

## 2022-05-02 RX ORDER — BUSPIRONE HYDROCHLORIDE 30 MG/1
30 TABLET ORAL 2 TIMES DAILY
Qty: 60 TABLET | Refills: 3 | Status: SHIPPED | OUTPATIENT
Start: 2022-05-02 | End: 2022-05-02 | Stop reason: SDUPTHER

## 2022-05-02 RX ORDER — ZOLPIDEM TARTRATE 5 MG/1
TABLET ORAL
Qty: 30 TABLET | Refills: 3 | Status: SHIPPED | OUTPATIENT
Start: 2022-05-02 | End: 2022-09-02 | Stop reason: SDUPTHER

## 2022-05-02 RX ORDER — BUSPIRONE HYDROCHLORIDE 30 MG/1
30 TABLET ORAL 2 TIMES DAILY
Qty: 60 TABLET | Refills: 3 | Status: SHIPPED | OUTPATIENT
Start: 2022-05-02 | End: 2022-09-02 | Stop reason: SDUPTHER

## 2022-05-02 RX ORDER — ZOLPIDEM TARTRATE 5 MG/1
TABLET ORAL
Qty: 30 TABLET | Refills: 2 | Status: SHIPPED | OUTPATIENT
Start: 2022-05-02 | End: 2022-05-02 | Stop reason: SDUPTHER

## 2022-05-02 RX ORDER — VILAZODONE HYDROCHLORIDE 20 MG/1
20 TABLET ORAL DAILY
Qty: 30 TABLET | Refills: 3 | Status: SHIPPED | OUTPATIENT
Start: 2022-05-02 | End: 2022-06-07 | Stop reason: SDUPTHER

## 2022-05-02 NOTE — TELEPHONE ENCOUNTER
Refill Routing Note   Medication(s) are not appropriate for processing by [facility] for the following reason(s):      - Non-participating provider    [facility] action(s):  Route          Medication reconciliation completed: No     Appointments  past 12m or future 3m with PCP    Date Provider   Last Visit   12/21/2018 Gladys Fernandez DO   Next Visit   Visit date not found Gladys Fernandez DO   ED visits in past 90 days: 0        Note composed:4:57 PM 05/02/2022
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
## 2022-05-03 RX ORDER — EPINEPHRINE 0.3 MG/.3ML
INJECTION SUBCUTANEOUS
Qty: 2 EACH | Refills: 6 | OUTPATIENT
Start: 2022-05-03

## 2022-05-17 DIAGNOSIS — E87.6 HYPOKALEMIA: ICD-10-CM

## 2022-05-17 RX ORDER — POTASSIUM CHLORIDE 750 MG/1
10 CAPSULE, EXTENDED RELEASE ORAL 2 TIMES DAILY
Qty: 60 CAPSULE | Refills: 1 | Status: SHIPPED | OUTPATIENT
Start: 2022-05-17 | End: 2022-08-01 | Stop reason: SDUPTHER

## 2022-06-07 RX ORDER — VILAZODONE HYDROCHLORIDE 20 MG/1
20 TABLET ORAL DAILY
Qty: 30 TABLET | Refills: 3 | Status: SHIPPED | OUTPATIENT
Start: 2022-06-07 | End: 2022-06-27 | Stop reason: SDUPTHER

## 2022-06-25 ENCOUNTER — HOSPITAL ENCOUNTER (EMERGENCY)
Facility: HOSPITAL | Age: 52
Discharge: HOME | End: 2022-06-25
Attending: EMERGENCY MEDICINE
Payer: MEDICARE

## 2022-06-25 VITALS
BODY MASS INDEX: 33.79 KG/M2 | SYSTOLIC BLOOD PRESSURE: 128 MMHG | HEART RATE: 70 BPM | DIASTOLIC BLOOD PRESSURE: 78 MMHG | HEIGHT: 62 IN | OXYGEN SATURATION: 98 % | TEMPERATURE: 98 F | WEIGHT: 183.63 LBS | RESPIRATION RATE: 18 BRPM

## 2022-06-25 DIAGNOSIS — R10.9 BILATERAL FLANK PAIN: ICD-10-CM

## 2022-06-25 DIAGNOSIS — N39.0 URINARY TRACT INFECTION WITHOUT HEMATURIA, SITE UNSPECIFIED: Primary | ICD-10-CM

## 2022-06-25 LAB
ALBUMIN SERPL BCP-MCNC: 3.7 G/DL (ref 3.5–5.2)
ALP SERPL-CCNC: 70 U/L (ref 55–135)
ALT SERPL W/O P-5'-P-CCNC: 30 U/L (ref 10–44)
ANION GAP SERPL CALC-SCNC: 11 MMOL/L (ref 8–16)
AST SERPL-CCNC: 28 U/L (ref 10–40)
BACTERIA #/AREA URNS HPF: ABNORMAL /HPF
BASOPHILS # BLD AUTO: 0.02 K/UL (ref 0–0.2)
BASOPHILS NFR BLD: 0.5 % (ref 0–1.9)
BILIRUB SERPL-MCNC: 0.5 MG/DL (ref 0.1–1)
BILIRUB UR QL STRIP: NEGATIVE
BUN SERPL-MCNC: 14 MG/DL (ref 6–20)
CALCIUM SERPL-MCNC: 9.5 MG/DL (ref 8.7–10.5)
CHLORIDE SERPL-SCNC: 105 MMOL/L (ref 95–110)
CLARITY UR: CLEAR
CO2 SERPL-SCNC: 25 MMOL/L (ref 23–29)
COLOR UR: YELLOW
CREAT SERPL-MCNC: 0.8 MG/DL (ref 0.5–1.4)
DIFFERENTIAL METHOD: ABNORMAL
EOSINOPHIL # BLD AUTO: 0.2 K/UL (ref 0–0.5)
EOSINOPHIL NFR BLD: 4.3 % (ref 0–8)
ERYTHROCYTE [DISTWIDTH] IN BLOOD BY AUTOMATED COUNT: 12.1 % (ref 11.5–14.5)
EST. GFR  (AFRICAN AMERICAN): >60 ML/MIN/1.73 M^2
EST. GFR  (NON AFRICAN AMERICAN): >60 ML/MIN/1.73 M^2
GLUCOSE SERPL-MCNC: 83 MG/DL (ref 70–110)
GLUCOSE UR QL STRIP: NEGATIVE
HCT VFR BLD AUTO: 38.4 % (ref 37–48.5)
HCV AB SERPL QL IA: NEGATIVE
HEP C VIRUS HOLD SPECIMEN: NORMAL
HGB BLD-MCNC: 12.6 G/DL (ref 12–16)
HGB UR QL STRIP: ABNORMAL
HIV 1+2 AB+HIV1 P24 AG SERPL QL IA: NEGATIVE
IMM GRANULOCYTES # BLD AUTO: 0.01 K/UL (ref 0–0.04)
IMM GRANULOCYTES NFR BLD AUTO: 0.3 % (ref 0–0.5)
KETONES UR QL STRIP: NEGATIVE
LEUKOCYTE ESTERASE UR QL STRIP: ABNORMAL
LIPASE SERPL-CCNC: 42 U/L (ref 4–60)
LYMPHOCYTES # BLD AUTO: 1.5 K/UL (ref 1–4.8)
LYMPHOCYTES NFR BLD: 39.4 % (ref 18–48)
MCH RBC QN AUTO: 28.6 PG (ref 27–31)
MCHC RBC AUTO-ENTMCNC: 32.8 G/DL (ref 32–36)
MCV RBC AUTO: 87 FL (ref 82–98)
MICROSCOPIC COMMENT: ABNORMAL
MONOCYTES # BLD AUTO: 0.6 K/UL (ref 0.3–1)
MONOCYTES NFR BLD: 14.1 % (ref 4–15)
NEUTROPHILS # BLD AUTO: 1.6 K/UL (ref 1.8–7.7)
NEUTROPHILS NFR BLD: 41.4 % (ref 38–73)
NITRITE UR QL STRIP: NEGATIVE
NRBC BLD-RTO: 0 /100 WBC
PH UR STRIP: 6 [PH] (ref 5–8)
PLATELET # BLD AUTO: 248 K/UL (ref 150–450)
PMV BLD AUTO: 9.9 FL (ref 9.2–12.9)
POTASSIUM SERPL-SCNC: 3.4 MMOL/L (ref 3.5–5.1)
PROT SERPL-MCNC: 8.4 G/DL (ref 6–8.4)
PROT UR QL STRIP: NEGATIVE
RBC # BLD AUTO: 4.41 M/UL (ref 4–5.4)
RBC #/AREA URNS HPF: 2 /HPF (ref 0–4)
SODIUM SERPL-SCNC: 141 MMOL/L (ref 136–145)
SP GR UR STRIP: 1.02 (ref 1–1.03)
SQUAMOUS #/AREA URNS HPF: 7 /HPF
URN SPEC COLLECT METH UR: ABNORMAL
UROBILINOGEN UR STRIP-ACNC: 1 EU/DL
WBC # BLD AUTO: 3.91 K/UL (ref 3.9–12.7)
WBC #/AREA URNS HPF: 72 /HPF (ref 0–5)

## 2022-06-25 PROCEDURE — 85025 COMPLETE CBC W/AUTO DIFF WBC: CPT | Performed by: EMERGENCY MEDICINE

## 2022-06-25 PROCEDURE — 86803 HEPATITIS C AB TEST: CPT | Performed by: EMERGENCY MEDICINE

## 2022-06-25 PROCEDURE — 96375 TX/PRO/DX INJ NEW DRUG ADDON: CPT

## 2022-06-25 PROCEDURE — 81000 URINALYSIS NONAUTO W/SCOPE: CPT | Performed by: EMERGENCY MEDICINE

## 2022-06-25 PROCEDURE — 99285 EMERGENCY DEPT VISIT HI MDM: CPT | Mod: 25

## 2022-06-25 PROCEDURE — 96374 THER/PROPH/DIAG INJ IV PUSH: CPT

## 2022-06-25 PROCEDURE — 96361 HYDRATE IV INFUSION ADD-ON: CPT

## 2022-06-25 PROCEDURE — 80053 COMPREHEN METABOLIC PANEL: CPT | Performed by: EMERGENCY MEDICINE

## 2022-06-25 PROCEDURE — 87389 HIV-1 AG W/HIV-1&-2 AB AG IA: CPT | Performed by: EMERGENCY MEDICINE

## 2022-06-25 PROCEDURE — 25000003 PHARM REV CODE 250: Performed by: EMERGENCY MEDICINE

## 2022-06-25 PROCEDURE — 83690 ASSAY OF LIPASE: CPT | Performed by: EMERGENCY MEDICINE

## 2022-06-25 PROCEDURE — 87086 URINE CULTURE/COLONY COUNT: CPT | Performed by: EMERGENCY MEDICINE

## 2022-06-25 PROCEDURE — 63600175 PHARM REV CODE 636 W HCPCS: Performed by: EMERGENCY MEDICINE

## 2022-06-25 RX ORDER — ONDANSETRON 2 MG/ML
4 INJECTION INTRAMUSCULAR; INTRAVENOUS
Status: COMPLETED | OUTPATIENT
Start: 2022-06-25 | End: 2022-06-25

## 2022-06-25 RX ORDER — PHENAZOPYRIDINE HYDROCHLORIDE 200 MG/1
200 TABLET, FILM COATED ORAL 3 TIMES DAILY
Qty: 6 TABLET | Refills: 0 | Status: SHIPPED | OUTPATIENT
Start: 2022-06-25 | End: 2022-06-27

## 2022-06-25 RX ORDER — KETOROLAC TROMETHAMINE 30 MG/ML
30 INJECTION, SOLUTION INTRAMUSCULAR; INTRAVENOUS
Status: COMPLETED | OUTPATIENT
Start: 2022-06-25 | End: 2022-06-25

## 2022-06-25 RX ORDER — SULFAMETHOXAZOLE AND TRIMETHOPRIM 800; 160 MG/1; MG/1
1 TABLET ORAL 2 TIMES DAILY
Qty: 14 TABLET | Refills: 0 | Status: SHIPPED | OUTPATIENT
Start: 2022-06-25 | End: 2022-07-02

## 2022-06-25 RX ADMIN — SODIUM CHLORIDE 1000 ML: 0.9 INJECTION, SOLUTION INTRAVENOUS at 01:06

## 2022-06-25 RX ADMIN — ONDANSETRON 4 MG: 2 INJECTION INTRAMUSCULAR; INTRAVENOUS at 01:06

## 2022-06-25 RX ADMIN — KETOROLAC TROMETHAMINE 30 MG: 30 INJECTION, SOLUTION INTRAMUSCULAR at 01:06

## 2022-06-25 NOTE — ED PROVIDER NOTES
SCRIBE #1 NOTE: I, Mitzi Powell, am scribing for, and in the presence of, Kemar Koenig Jr., MD. I have scribed the entire note.      History      Chief Complaint   Patient presents with    Dysuria     States she has had a UTI x 1 month, has taken 3 different types of antibiotics.       Review of patient's allergies indicates:   Allergen Reactions    Shellfish containing products     Latex, natural rubber Hives    Nitrofurantoin monohyd/m-cryst      urticaria    Peanut     Codeine Itching    Gloves, latex with aloe vera Rash    Lisinopril Rash and Other (See Comments)     cough        HPI   HPI    6/25/2022, 12:51 PM   History obtained from the patient      History of Present Illness: Irasema Vang is a 51 y.o. female patient with a PMHx of asthma, HTN, nephrolithiasis, PTSD, renal cell carcinoma, seizures, urinoma, and uterine cancer who presents to the Emergency Department for dysuria which onset gradually over 1 month PTA. Pt reports that she saw a Urologist for her sxs, had a US performed, was diagnosed with a nonobstructive kidney stone, a gallstone, a cyst on the kidney, and a UTI. For the UTI, the pt reports that she has been on 3 different abx. She reports that the first abx she was on was cephalexin and it helped relieve her sxs, but once she finished the course of cephalexin, her sxs worsened. Since being treated with cephalexin she reports that she has been on 2 other abx, including, amoxicillin and possibly cipro, which have not helped with her sxs. Symptoms are constant and moderate in severity. No mitigating or exacerbating factors reported. Associated sxs include nausea, generalized lower abdominal pain, and mild bilateral flank pain. Patient denies any fever, chills, V/D, hematuria, CP, SOB, weakness, and all other sxs at this time. The pt reports that her pain feels like a kidney stone, pt denies any colic type pain. In the past, the pt reports that she has had to have a lithotripsy and  stents placed for kidney stone treatment. Pt reports that she has had a hysterectomy. She denies being sexually active. No further complaints or concerns at this time.         Arrival mode: Personal vehicle      [facility]       Past Medical History:  Past Medical History:   Diagnosis Date    Abnormal Pap smear     history of uterine cancer    Abnormal Pap smear of cervix     Asthma     no meds since age 20    Constipation, chronic     Depression     Family history of nephrolithiasis     General anesthetics causing adverse effect in therapeutic use     slow to wake up    History of cervical dysplasia 6/27/2013    Hypertension     Menopausal syndrome (hot flashes) 6/27/2013    Nephrolithiasis 6/27/2013    PONV (postoperative nausea and vomiting)     PTSD (post-traumatic stress disorder)     Renal cell carcinoma     Seizures     1 yr ago- caused by migraine meds--no treatment now    Urinoma     Uterine cancer 2000       Past Surgical History:  Past Surgical History:   Procedure Laterality Date    COLONOSCOPY  05/17/13    non-bleeding AVMs    COLONOSCOPY W/ POLYPECTOMY      cystoscopy with stent placement      ESOPHAGOGASTRODUODENOSCOPY  05/17/13    HYSTERECTOMY      LAPAROSCOPIC LYSIS OF ADHESIONS N/A 9/4/2018    Procedure: LYSIS, ADHESIONS, LAPAROSCOPIC;  Surgeon: Darby Monroy MD;  Location: [facility];  Service: OB/GYN;  Laterality: N/A;    LAPAROSCOPIC OOPHORECTOMY Left 9/4/2018    Procedure: OOPHORECTOMY, LAPAROSCOPIC;  Surgeon: Darby Monroy MD;  Location: [facility] OR;  Service: OB/GYN;  Laterality: Left;    LAPAROSCOPIC SURGICAL REMOVAL OF CYST OF OVARY Right 9/4/2018    Procedure: EXCISION, CYST, OVARY, LAPAROSCOPIC;  Surgeon: Darby Monroy MD;  Location: [facility] OR;  Service: OB/GYN;  Laterality: Right;    Laparoscopy  09/05/2017    OOPHORECTOMY      1 ovary removed    URETEROSCOPY           Family History:  Family History    Problem Relation Age of Onset    Cancer Maternal Grandmother     Diabetes Brother     Hypertension Mother     Cancer Father     Breast cancer Paternal Aunt     Ovarian cancer Paternal Aunt     Breast cancer Paternal Aunt     Breast cancer Paternal Aunt     Colon cancer Neg Hx        Social History:  Social History     Tobacco Use    Smoking status: Never Smoker    Smokeless tobacco: Never Used   Substance and Sexual Activity    Alcohol use: Not Currently     Comment: occassionally     Drug use: Yes     Types: Marijuana     Comment: Liquid Cannabis Oil    Sexual activity: Not Currently     Partners: Male       ROS   Review of Systems   Constitutional: Negative for chills and fever.   HENT: Negative for sore throat.    Respiratory: Negative for shortness of breath.    Cardiovascular: Negative for chest pain.   Gastrointestinal: Positive for abdominal pain (generalized lower) and nausea. Negative for diarrhea and vomiting.   Genitourinary: Positive for dysuria and flank pain (bilateral). Negative for hematuria.   Musculoskeletal: Negative for back pain.   Skin: Negative for rash.   Neurological: Negative for weakness.   Hematological: Does not bruise/bleed easily.   All other systems reviewed and are negative.    Physical Exam      Initial Vitals [06/25/22 1205]   BP Pulse Resp Temp SpO2   132/82 73 20 97.4 °F (36.3 °C) 100 %      MAP       --          Physical Exam  Nursing Notes and Vital Signs Reviewed.  Constitutional: Patient is in no acute distress. Well-developed and well-nourished.  Head: Atraumatic. Normocephalic.  Eyes: PERRL. EOM intact. Conjunctivae are not pale. No scleral icterus.  ENT: Mucous membranes are moist. Oropharynx is clear and symmetric.    Neck: Supple. Full ROM. No lymphadenopathy.  Cardiovascular: Regular rate. Regular rhythm. No murmurs, rubs, or gallops. Distal pulses are 2+ and symmetric.  Pulmonary/Chest: No respiratory distress. Clear to auscultation bilaterally. No  "wheezing or rales.  Abdominal: Soft and non-distended.  There is suprapubic abdominal tenderness.  No rebound, guarding, or rigidity.  Genitourinary: There is bilateral flank tenderness to percussion.  Musculoskeletal: Moves all extremities. No obvious deformities. No edema.  Skin: Warm and dry.  Neurological:  Alert, awake, and appropriate.  Normal speech.  No acute focal neurological deficits are appreciated.  Psychiatric: Normal affect. Good eye contact. Appropriate in content.    ED Course    Procedures  ED Vital Signs:  Vitals:    06/25/22 1205 06/25/22 1537   BP: 132/82 128/78   Pulse: 73 70   Resp: 20 18   Temp: 97.4 °F (36.3 °C) 97.5 °F (36.4 °C)   TempSrc: Oral Oral   SpO2: 100% 98%   Weight: 83.3 kg (183 lb 10.3 oz)    Height: 5' 2" (1.575 m)        Abnormal Lab Results:  Labs Reviewed   URINALYSIS, REFLEX TO URINE CULTURE - Abnormal; Notable for the following components:       Result Value    Occult Blood UA 1+ (*)     Leukocytes, UA 2+ (*)     All other components within normal limits    Narrative:     Specimen Source->Urine   CBC W/ AUTO DIFFERENTIAL - Abnormal; Notable for the following components:    Gran # (ANC) 1.6 (*)     All other components within normal limits   COMPREHENSIVE METABOLIC PANEL - Abnormal; Notable for the following components:    Potassium 3.4 (*)     All other components within normal limits   URINALYSIS MICROSCOPIC - Abnormal; Notable for the following components:    WBC, UA 72 (*)     All other components within normal limits    Narrative:     Specimen Source->Urine   CULTURE, URINE   HIV 1 / 2 ANTIBODY    Narrative:     Release to patient->Immediate   HEPATITIS C ANTIBODY    Narrative:     Release to patient->Immediate   HEP C VIRUS HOLD SPECIMEN    Narrative:     Release to patient->Immediate   LIPASE        All Lab Results:  Results for orders placed or performed during the hospital encounter of 06/25/22   HIV 1/2 Ag/Ab (4th Gen)   Result Value Ref Range    HIV 1/2 Ag/Ab " Negative Negative   Hepatitis C Antibody   Result Value Ref Range    Hepatitis C Ab Negative Negative   HCV Virus Hold Specimen   Result Value Ref Range    HEP C Virus Hold Specimen Hold for HCV sendout    Urinalysis, Reflex to Urine Culture Urine, Clean Catch    Specimen: Urine   Result Value Ref Range    Specimen UA Urine, Clean Catch     Color, UA Yellow Yellow, Straw, Marie    Appearance, UA Clear Clear    pH, UA 6.0 5.0 - 8.0    Specific Gravity, UA 1.025 1.005 - 1.030    Protein, UA Negative Negative    Glucose, UA Negative Negative    Ketones, UA Negative Negative    Bilirubin (UA) Negative Negative    Occult Blood UA 1+ (A) Negative    Nitrite, UA Negative Negative    Urobilinogen, UA 1.0 <2.0 EU/dL    Leukocytes, UA 2+ (A) Negative   CBC W/ AUTO DIFFERENTIAL   Result Value Ref Range    WBC 3.91 3.90 - 12.70 K/uL    RBC 4.41 4.00 - 5.40 M/uL    Hemoglobin 12.6 12.0 - 16.0 g/dL    Hematocrit 38.4 37.0 - 48.5 %    MCV 87 82 - 98 fL    MCH 28.6 27.0 - 31.0 pg    MCHC 32.8 32.0 - 36.0 g/dL    RDW 12.1 11.5 - 14.5 %    Platelets 248 150 - 450 K/uL    MPV 9.9 9.2 - 12.9 fL    Immature Granulocytes 0.3 0.0 - 0.5 %    Gran # (ANC) 1.6 (L) 1.8 - 7.7 K/uL    Immature Grans (Abs) 0.01 0.00 - 0.04 K/uL    Lymph # 1.5 1.0 - 4.8 K/uL    Mono # 0.6 0.3 - 1.0 K/uL    Eos # 0.2 0.0 - 0.5 K/uL    Baso # 0.02 0.00 - 0.20 K/uL    nRBC 0 0 /100 WBC    Gran % 41.4 38.0 - 73.0 %    Lymph % 39.4 18.0 - 48.0 %    Mono % 14.1 4.0 - 15.0 %    Eosinophil % 4.3 0.0 - 8.0 %    Basophil % 0.5 0.0 - 1.9 %    Differential Method Automated    Comp. Metabolic Panel   Result Value Ref Range    Sodium 141 136 - 145 mmol/L    Potassium 3.4 (L) 3.5 - 5.1 mmol/L    Chloride 105 95 - 110 mmol/L    CO2 25 23 - 29 mmol/L    Glucose 83 70 - 110 mg/dL    BUN 14 6 - 20 mg/dL    Creatinine 0.8 0.5 - 1.4 mg/dL    Calcium 9.5 8.7 - 10.5 mg/dL    Total Protein 8.4 6.0 - 8.4 g/dL    Albumin 3.7 3.5 - 5.2 g/dL    Total Bilirubin 0.5 0.1 - 1.0 mg/dL     Alkaline Phosphatase 70 55 - 135 U/L    AST 28 10 - 40 U/L    ALT 30 10 - 44 U/L    Anion Gap 11 8 - 16 mmol/L    eGFR if African American >60 >60 mL/min/1.73 m^2    eGFR if non African American >60 >60 mL/min/1.73 m^2   Lipase   Result Value Ref Range    Lipase 42 4 - 60 U/L   Urinalysis Microscopic   Result Value Ref Range    RBC, UA 2 0 - 4 /hpf    WBC, UA 72 (H) 0 - 5 /hpf    Bacteria Occasional None-Occ /hpf    Squam Epithel, UA 7 /hpf    Microscopic Comment SEE COMMENT          Imaging Results:  Imaging Results          CT Renal Stone Study ABD Pelvis WO (Final result)  Result time 06/25/22 14:05:17    Final result by George Palumbo MD (06/25/22 14:05:17)                 Impression:      1. Stable 3 mm parenchymal calcification mid left kidney.  No obstructive uropathy.  2. There is no CT evidence of an acute intra-abdominal inflammatory process.  3. Innumerable subcutaneous nodules scattered across gluteal/pelvic distribution with increased multiplicity compared to prior examinations, presumably injection granulomas.  4. Hysterectomy/oophorectomy.  5. Degenerative lower lumbar spine.  All CT scans at this facility are performed  using dose modulation techniques as appropriate to performed exam including the following:  automated exposure control; adjustment of mA and/or kV according to the patients size (this includes techniques or standardized protocols for targeted exams where dose is matched to indication/reason for exam: i.e. extremities or head);  iterative reconstruction technique.      Electronically signed by: George Palumbo MD  Date:    06/25/2022  Time:    14:05             Narrative:    EXAMINATION:  CT RENAL STONE STUDY ABD PELVIS WO    CLINICAL HISTORY:  Flank pain, kidney stone suspected; Unspecified abdominal pain    TECHNIQUE:  Abdominal and pelvic CT without contrast.  Coronal and sagittal reformations.    COMPARISON:  04/15/2016.  06/25/2015.    FINDINGS:  Abdominal CT.  The lung bases are  clear.    Noncontrast evaluation liver, spleen, pancreas, adrenal glands, and kidneys is unremarkable other than a 3 mm calcification mid peripheral parenchyma left kidney.  No obstructive uropathy.    Normal aorta.  No adenopathy.  Normal gallbladder.  No bile duct dilatation.    No bowel obstruction.  The appendix is normal.  The GI tract is otherwise unremarkable.    Multilevel lower lumbar degenerative disc disease and facet arthropathy.    Pelvic CT.  The pelvic ring is intact.    The ureters and urinary bladder are normal.  Pelvic phleboliths.    Hysterectomy and bilateral oophorectomy.    There are a multitude of subcutaneous nodules scattered across gluteal/pelvic distribution with increased multiplicity compared to the prior examination, presumably injection granulomas.    The pelvic bowel loops are unremarkable.  There is no pelvic free fluid or lymphadenopathy.                                        The Emergency Provider reviewed the vital signs and test results, which are outlined above.    ED Discussion     2:53 PM: Reassessed pt at this time.  Discussed with pt all pertinent ED information and results. Discussed pt dx and plan of tx. Gave pt all f/u and return to the ED instructions. All questions and concerns were addressed at this time. Pt expresses understanding of information and instructions, and is comfortable with plan to discharge. Pt is stable for discharge.    I discussed with patient and/or family/caretaker that evaluation in the ED does not suggest any emergent or life threatening medical conditions requiring immediate intervention beyond what was provided in the ED, and I believe patient is safe for discharge.  Regardless, an unremarkable evaluation in the ED does not preclude the development or presence of a serious of life threatening condition. As such, patient was instructed to return immediately for any worsening or change in current symptoms.    For URINARY TRACT INFECTION, I  instructed patient to avoid holding in urine and recommended that she urinate when she feels the urge.  Also advised patient to drink plenty of liquids and reminded patient that she may need to drink more fluids than usual to help flush out the bacteria. Instructed patient to avoid alcohol, caffeine, and citrus juices as these substances can irritate your bladder and increase your symptoms.  Also recommended that patient apply heat to lower abdomen for 20 to 30 minutes every two hours to help decrease discomfort and pressure in the bladder.         ED Medication(s):  Medications   sodium chloride 0.9% bolus 1,000 mL (0 mLs Intravenous Stopped 6/25/22 1411)   ondansetron injection 4 mg (4 mg Intravenous Given 6/25/22 1311)   ketorolac injection 30 mg (30 mg Intravenous Given 6/25/22 1311)        Follow-up [facility]. Schedule an appointment as soon as possible for a visit in 1 week.    Contact information:  [address] [facility] [address] [phone]             [facility]. Schedule an appointment as soon as possible for a visit in 1 week.    Contact information:  [address] [facility] [address] [phone]             [address] Schedule an appointment as soon as possible for a visit in 1 week.    Specialty: Internal Medicine  Contact information:  [address] [facility] [address] [phone]  Additional information:  Please park on the [address] side and use the Clinic entrance. Check in on the 2nd floor, to the right.           [facility] - Emergency Dept..    Specialty: Emergency Medicine  Why: As needed, If symptoms worsen  Contact information:  [address]  [facility] [address] [phone]                       Discharge Medication List as of 6/25/2022  2:53 PM      START taking these medications    Details   phenazopyridine (PYRIDIUM) 200 MG tablet Take 1  tablet (200 mg total) by mouth 3 (three) times daily. for 2 days, Starting Sat 6/25/2022, Until Mon 6/27/2022, Print      sulfamethoxazole-trimethoprim 800-160mg (BACTRIM DS) 800-160 mg Tab Take 1 tablet by mouth 2 (two) times daily. for 7 days, Starting Sat 6/25/2022, Until Sat 7/2/2022, Print              Medication List      START taking these medications    phenazopyridine 200 MG tablet  Commonly known as: PYRIDIUM  Take 1 tablet (200 mg total) by mouth 3 (three) times daily. for 2 days     sulfamethoxazole-trimethoprim 800-160mg 800-160 mg Tab  Commonly known as: BACTRIM DS  Take 1 tablet by mouth 2 (two) times daily. for 7 days        ASK your doctor about these medications    busPIRone 30 MG Tab  Commonly known as: BUSPAR  Take 1 tablet (30 mg total) by mouth 2 (two) times daily.     DivigeL 1 mg/gram (0.1 %) topical gel  Generic drug: estradioL  Place 1 g onto the skin once daily.     EPINEPHrine 0.3 mg/0.3 mL Atin  Commonly known as: EPIPEN  Inject into the muscle as needed.     hydroCHLOROthiazide 25 MG tablet  Commonly known as: HYDRODIURIL  Take 1 tablet (25 mg total) by mouth once daily.     ibuprofen 800 MG tablet  Commonly known as: ADVIL,MOTRIN  Take 1 tablet (800 mg total) by mouth every 6 (six) hours as needed for Pain.     losartan 50 MG tablet  Commonly known as: COZAAR  Take 1 tablet (50 mg total) by mouth once daily.     omeprazole 40 MG capsule  Commonly known as: PRILOSEC  Take 1 capsule (40 mg total) by mouth once daily.     vilazodone 20 mg Tab  Commonly known as: VIIBRYD  Take 1 tablet (20 mg total) by mouth once daily.     zolpidem 5 MG Tab  Commonly known as: AMBIEN  Take 1/2 to 1 tablet at bedtime as needed for sleep           Where to Get Your Medications      You can get these medications from any pharmacy    Bring a paper prescription for each of these medications  · phenazopyridine 200 MG tablet  · sulfamethoxazole-trimethoprim 800-160mg 800-160 mg Tab           Medical Decision  Making    Medical Decision Making:   Clinical Tests:   Lab Tests: Ordered and Reviewed  Radiological Study: Ordered and Reviewed           Scribe Attestation:   Scribe #1: I performed the above scribed service and the documentation accurately describes the services I performed. I attest to the accuracy of the note.    Attending:   Physician Attestation Statement for Scribe #1: I, Kemar Koenig Jr., MD, personally performed the services described in this documentation, as scribed by Mitzi Powell, in my presence, and it is both accurate and complete.          Clinical Impression       ICD-10-CM ICD-9-CM   1. Urinary tract infection without hematuria, site unspecified  N39.0 599.0   2. Bilateral flank pain  R10.9 789.09       Disposition:   Disposition: Discharged  Condition: Stable         Kemar Koenig Jr., MD  06/26/22 0637

## 2022-06-27 LAB — BACTERIA UR CULT: NO GROWTH

## 2022-06-27 RX ORDER — VILAZODONE HYDROCHLORIDE 20 MG/1
20 TABLET ORAL DAILY
Qty: 30 TABLET | Refills: 2 | Status: SHIPPED | OUTPATIENT
Start: 2022-06-27 | End: 2022-06-29 | Stop reason: SDUPTHER

## 2022-06-29 RX ORDER — VILAZODONE HYDROCHLORIDE 20 MG/1
20 TABLET ORAL DAILY
Qty: 30 TABLET | Refills: 2 | Status: SHIPPED | OUTPATIENT
Start: 2022-06-29 | End: 2022-09-02 | Stop reason: SDUPTHER

## 2022-07-01 ENCOUNTER — PATIENT OUTREACH (OUTPATIENT)
Dept: ADMINISTRATIVE | Facility: HOSPITAL | Age: 52
End: 2022-07-01
Payer: MEDICARE

## 2022-07-01 NOTE — Clinical Note
If pt is not a candidate please document why, add to problem list.  If you have a patient that does not have any DM or vascular disease equivalent, you can calculate ASCVD risk score, (. risk smart phrase).  If under 7.5 of risk score, not considered at risk. If patient above, then statin can be considered.

## 2022-07-01 NOTE — PROGRESS NOTES
Working [facility] Statin Adherance.     Per med card pt is not on a statin.    Please review and consider adding a statin.  If agree please let pt know and send in rx.

## 2022-07-04 ENCOUNTER — DOCUMENTATION ONLY (OUTPATIENT)
Dept: PRIMARY CARE CLINIC | Facility: CLINIC | Age: 52
End: 2022-07-04
Payer: MEDICARE

## 2022-07-04 NOTE — PROGRESS NOTES
Subjective:       Patient ID: Irasema Vang is a 51 y.o. female.    Chief Complaint: No chief complaint on file.      HPI   History of Present Illness:   Irasema Vang 51 y.o. female presents today with       The 10-year ASCVD risk score (Erica ENRIQUEZ Jr., et al., 2013) is: 5.7%    Values used to calculate the score:      Age: 51 years      Sex: Female      Is Non- : Yes      Diabetic: No      Tobacco smoker: No      Systolic Blood Pressure: 128 mmHg      Is BP treated: Yes      HDL Cholesterol: 40 mg/dL      Total Cholesterol: 202 mg/dL       No indication for statin.  Past Medical History:   Diagnosis Date    Abnormal Pap smear     history of uterine cancer    Abnormal Pap smear of cervix     Asthma     no meds since age 20    Constipation, chronic     Depression     Family history of nephrolithiasis     General anesthetics causing adverse effect in therapeutic use     slow to wake up    History of cervical dysplasia 6/27/2013    Hypertension     Menopausal syndrome (hot flashes) 6/27/2013    Nephrolithiasis 6/27/2013    PONV (postoperative nausea and vomiting)     PTSD (post-traumatic stress disorder)     Renal cell carcinoma     Seizures     1 yr ago- caused by migraine meds--no treatment now    Urinoma     Uterine cancer 2000     Family History   Problem Relation Age of Onset    Cancer Maternal Grandmother     Diabetes Brother     Hypertension Mother     Cancer Father     Breast cancer Paternal Aunt     Ovarian cancer Paternal Aunt     Breast cancer Paternal Aunt     Breast cancer Paternal Aunt     Colon cancer Neg Hx      Social History     Socioeconomic History    Marital status:    Tobacco Use    Smoking status: Never Smoker    Smokeless tobacco: Never Used   Substance and Sexual Activity    Alcohol use: Not Currently     Comment: occassionally     Drug use: Yes     Types: Marijuana     Comment: Liquid Cannabis Oil    Sexual activity: Not  Currently     Partners: Male     Social Determinants of Health     Financial Resource Strain: Low Risk     Difficulty of Paying Living Expenses: Not hard at all   Food Insecurity: No Food Insecurity    Worried About Running Out of Food in the Last Year: Never true    Ran Out of Food in the Last Year: Never true   Transportation Needs: No Transportation Needs    Lack of Transportation (Medical): No    Lack of Transportation (Non-Medical): No   Physical Activity: Insufficiently Active    Days of Exercise per Week: 1 day    Minutes of Exercise per Session: 30 min   Stress: No Stress Concern Present    Feeling of Stress : Only a little   Social Connections: Moderately Integrated    Frequency of Communication with Friends and Family: More than three times a week    Frequency of Social Gatherings with Friends and Family: Once a week    Attends Ep[employer] Services: More than 4 times per year    Active Member of Clubs or Organizations: Yes    Attends Club or Organization Meetings: More than 4 times per year    Marital Status:    Housing Stability: Unknown    Unable to Pay for Housing in the Last Year: No    Unstable Housing in the Last Year: No     Outpatient Encounter Medications as of 7/4/2022   Medication Sig Dispense Refill    busPIRone (BUSPAR) 30 MG Tab Take 1 tablet (30 mg total) by mouth 2 (two) times daily. 60 tablet 3    EPINEPHrine (EPIPEN) 0.3 mg/0.3 mL AtIn Inject into the muscle as needed. (Patient not taking: Reported on 4/7/2022) 2 each 6    estradiol (DIVIGEL) 1 mg/gram (0.1 %) topical gel Place 1 g onto the skin once daily. 30 g 12    hydroCHLOROthiazide (HYDRODIURIL) 25 MG tablet Take 1 tablet (25 mg total) by mouth once daily. 90 tablet 1    ibuprofen (ADVIL,MOTRIN) 800 MG tablet Take 1 tablet (800 mg total) by mouth every 6 (six) hours as needed for Pain. 30 tablet 1    losartan (COZAAR) 50 MG tablet Take 1 tablet (50 mg total) by mouth once daily. 90 tablet 3    omeprazole  (PRILOSEC) 40 MG capsule Take 1 capsule (40 mg total) by mouth once daily. 30 capsule 11    vilazodone (VIIBRYD) 20 mg Tab Take 1 tablet (20 mg total) by mouth once daily. 30 tablet 2    zolpidem (AMBIEN) 5 MG Tab Take 1/2 to 1 tablet at bedtime as needed for sleep 30 tablet 3     Facility-Administered Encounter Medications as of 7/4/2022   Medication Dose Route Frequency Provider Last Rate Last Admin    lactated ringers infusion   Intravenous Continuous Darby Monroy MD   Stopped at 09/04/18 1347    nozaseptin (NOZIN) nasal    Each Nostril On Call Procedure Darby Monroy MD   Given at 09/04/18 1019    phenazopyridine tablet 200 mg  200 mg Oral On Call Procedure Darby Monroy MD   200 mg at 09/04/18 1108       Review of Systems    Objective:      LMP 01/05/2001   Physical Exam    Results for orders placed or performed during the hospital encounter of 06/25/22   Urine culture    Specimen: Urine   Result Value Ref Range    Urine Culture, Routine No growth    HIV 1/2 Ag/Ab (4th Gen)   Result Value Ref Range    HIV 1/2 Ag/Ab Negative Negative   Hepatitis C Antibody   Result Value Ref Range    Hepatitis C Ab Negative Negative   HCV Virus Hold Specimen   Result Value Ref Range    HEP C Virus Hold Specimen Hold for HCV sendout    Urinalysis, Reflex to Urine Culture Urine, Clean Catch    Specimen: Urine   Result Value Ref Range    Specimen UA Urine, Clean Catch     Color, UA Yellow Yellow, Straw, Marie    Appearance, UA Clear Clear    pH, UA 6.0 5.0 - 8.0    Specific Gravity, UA 1.025 1.005 - 1.030    Protein, UA Negative Negative    Glucose, UA Negative Negative    Ketones, UA Negative Negative    Bilirubin (UA) Negative Negative    Occult Blood UA 1+ (A) Negative    Nitrite, UA Negative Negative    Urobilinogen, UA 1.0 <2.0 EU/dL    Leukocytes, UA 2+ (A) Negative   CBC W/ AUTO DIFFERENTIAL   Result Value Ref Range    WBC 3.91 3.90 - 12.70 K/uL    RBC 4.41 4.00 - 5.40 M/uL     Hemoglobin 12.6 12.0 - 16.0 g/dL    Hematocrit 38.4 37.0 - 48.5 %    MCV 87 82 - 98 fL    MCH 28.6 27.0 - 31.0 pg    MCHC 32.8 32.0 - 36.0 g/dL    RDW 12.1 11.5 - 14.5 %    Platelets 248 150 - 450 K/uL    MPV 9.9 9.2 - 12.9 fL    Immature Granulocytes 0.3 0.0 - 0.5 %    Gran # (ANC) 1.6 (L) 1.8 - 7.7 K/uL    Immature Grans (Abs) 0.01 0.00 - 0.04 K/uL    Lymph # 1.5 1.0 - 4.8 K/uL    Mono # 0.6 0.3 - 1.0 K/uL    Eos # 0.2 0.0 - 0.5 K/uL    Baso # 0.02 0.00 - 0.20 K/uL    nRBC 0 0 /100 WBC    Gran % 41.4 38.0 - 73.0 %    Lymph % 39.4 18.0 - 48.0 %    Mono % 14.1 4.0 - 15.0 %    Eosinophil % 4.3 0.0 - 8.0 %    Basophil % 0.5 0.0 - 1.9 %    Differential Method Automated    Comp. Metabolic Panel   Result Value Ref Range    Sodium 141 136 - 145 mmol/L    Potassium 3.4 (L) 3.5 - 5.1 mmol/L    Chloride 105 95 - 110 mmol/L    CO2 25 23 - 29 mmol/L    Glucose 83 70 - 110 mg/dL    BUN 14 6 - 20 mg/dL    Creatinine 0.8 0.5 - 1.4 mg/dL    Calcium 9.5 8.7 - 10.5 mg/dL    Total Protein 8.4 6.0 - 8.4 g/dL    Albumin 3.7 3.5 - 5.2 g/dL    Total Bilirubin 0.5 0.1 - 1.0 mg/dL    Alkaline Phosphatase 70 55 - 135 U/L    AST 28 10 - 40 U/L    ALT 30 10 - 44 U/L    Anion Gap 11 8 - 16 mmol/L    eGFR if African American >60 >60 mL/min/1.73 m^2    eGFR if non African American >60 >60 mL/min/1.73 m^2   Lipase   Result Value Ref Range    Lipase 42 4 - 60 U/L   Urinalysis Microscopic   Result Value Ref Range    RBC, UA 2 0 - 4 /hpf    WBC, UA 72 (H) 0 - 5 /hpf    Bacteria Occasional None-Occ /hpf    Squam Epithel, UA 7 /hpf    Microscopic Comment SEE COMMENT      Assessment:       No diagnosis found.    Plan:   There are no diagnoses linked to this encounter.    Kaya Pal MD

## 2022-07-05 ENCOUNTER — PATIENT OUTREACH (OUTPATIENT)
Dept: ADMINISTRATIVE | Facility: HOSPITAL | Age: 52
End: 2022-07-05
Payer: MEDICARE

## 2022-08-01 DIAGNOSIS — E87.6 HYPOKALEMIA: ICD-10-CM

## 2022-08-01 RX ORDER — POTASSIUM CHLORIDE 750 MG/1
10 CAPSULE, EXTENDED RELEASE ORAL 2 TIMES DAILY
Qty: 60 CAPSULE | Refills: 0 | Status: SHIPPED | OUTPATIENT
Start: 2022-08-01 | End: 2022-12-06 | Stop reason: SDUPTHER

## 2022-08-26 ENCOUNTER — PATIENT OUTREACH (OUTPATIENT)
Dept: ADMINISTRATIVE | Facility: HOSPITAL | Age: 52
End: 2022-08-26
Payer: MEDICARE

## 2022-08-26 NOTE — PROGRESS NOTES
PHN Ace/Arb Report: Called to educate on taking & picking up meds as prescribed & to refill medication 1-2 weeks before running out. Pt did not answer, Patient verbalizes understanding. Pt informs [facility] she request appt with Dr. Pal & accepts appt for 12/05/22. She also informs she sees Cardiologist Dr. Koch & will verify with him if she should be on Statin.

## 2022-09-02 ENCOUNTER — OFFICE VISIT (OUTPATIENT)
Dept: PSYCHIATRY | Facility: CLINIC | Age: 52
End: 2022-09-02
Payer: MEDICARE

## 2022-09-02 VITALS
BODY MASS INDEX: 33.83 KG/M2 | WEIGHT: 184.94 LBS | DIASTOLIC BLOOD PRESSURE: 88 MMHG | SYSTOLIC BLOOD PRESSURE: 124 MMHG | HEART RATE: 73 BPM

## 2022-09-02 DIAGNOSIS — F41.9 ANXIETY: ICD-10-CM

## 2022-09-02 DIAGNOSIS — G47.00 INSOMNIA, UNSPECIFIED TYPE: ICD-10-CM

## 2022-09-02 DIAGNOSIS — F32.A CHRONIC DEPRESSION: Primary | ICD-10-CM

## 2022-09-02 PROCEDURE — 99214 PR OFFICE/OUTPT VISIT, EST, LEVL IV, 30-39 MIN: ICD-10-PCS | Mod: S$GLB,,, | Performed by: PSYCHIATRY & NEUROLOGY

## 2022-09-02 PROCEDURE — 4010F ACE/ARB THERAPY RXD/TAKEN: CPT | Mod: CPTII,S$GLB,, | Performed by: PSYCHIATRY & NEUROLOGY

## 2022-09-02 PROCEDURE — 99999 PR PBB SHADOW E&M-EST. PATIENT-LVL II: ICD-10-PCS | Mod: PBBFAC,,, | Performed by: PSYCHIATRY & NEUROLOGY

## 2022-09-02 PROCEDURE — 99214 OFFICE O/P EST MOD 30 MIN: CPT | Mod: S$GLB,,, | Performed by: PSYCHIATRY & NEUROLOGY

## 2022-09-02 PROCEDURE — 4010F PR ACE/ARB THEARPY RXD/TAKEN: ICD-10-PCS | Mod: CPTII,S$GLB,, | Performed by: PSYCHIATRY & NEUROLOGY

## 2022-09-02 PROCEDURE — 99999 PR PBB SHADOW E&M-EST. PATIENT-LVL II: CPT | Mod: PBBFAC,,, | Performed by: PSYCHIATRY & NEUROLOGY

## 2022-09-02 PROCEDURE — 3074F PR MOST RECENT SYSTOLIC BLOOD PRESSURE < 130 MM HG: ICD-10-PCS | Mod: CPTII,S$GLB,, | Performed by: PSYCHIATRY & NEUROLOGY

## 2022-09-02 PROCEDURE — 3008F BODY MASS INDEX DOCD: CPT | Mod: CPTII,S$GLB,, | Performed by: PSYCHIATRY & NEUROLOGY

## 2022-09-02 PROCEDURE — 3079F PR MOST RECENT DIASTOLIC BLOOD PRESSURE 80-89 MM HG: ICD-10-PCS | Mod: CPTII,S$GLB,, | Performed by: PSYCHIATRY & NEUROLOGY

## 2022-09-02 PROCEDURE — 3008F PR BODY MASS INDEX (BMI) DOCUMENTED: ICD-10-PCS | Mod: CPTII,S$GLB,, | Performed by: PSYCHIATRY & NEUROLOGY

## 2022-09-02 PROCEDURE — 99499 UNLISTED E&M SERVICE: CPT | Mod: S$GLB,,, | Performed by: PSYCHIATRY & NEUROLOGY

## 2022-09-02 PROCEDURE — 3079F DIAST BP 80-89 MM HG: CPT | Mod: CPTII,S$GLB,, | Performed by: PSYCHIATRY & NEUROLOGY

## 2022-09-02 PROCEDURE — 99499 RISK ADDL DX/OHS AUDIT: ICD-10-PCS | Mod: S$GLB,,, | Performed by: PSYCHIATRY & NEUROLOGY

## 2022-09-02 PROCEDURE — 3074F SYST BP LT 130 MM HG: CPT | Mod: CPTII,S$GLB,, | Performed by: PSYCHIATRY & NEUROLOGY

## 2022-09-02 RX ORDER — VILAZODONE HYDROCHLORIDE 20 MG/1
20 TABLET ORAL DAILY
Qty: 30 TABLET | Refills: 3 | Status: SHIPPED | OUTPATIENT
Start: 2022-09-02 | End: 2022-09-30 | Stop reason: SDUPTHER

## 2022-09-02 RX ORDER — BUSPIRONE HYDROCHLORIDE 30 MG/1
30 TABLET ORAL 2 TIMES DAILY
Qty: 60 TABLET | Refills: 3 | Status: SHIPPED | OUTPATIENT
Start: 2022-09-02 | End: 2023-01-03

## 2022-09-02 RX ORDER — ZOLPIDEM TARTRATE 5 MG/1
TABLET ORAL
Qty: 30 TABLET | Refills: 3 | Status: SHIPPED | OUTPATIENT
Start: 2022-09-02 | End: 2023-01-03 | Stop reason: SDUPTHER
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
## 2022-11-01 ENCOUNTER — LAB VISIT (OUTPATIENT)
Dept: LAB | Facility: HOSPITAL | Age: 52
End: 2022-11-01
Attending: FAMILY MEDICINE
Payer: MEDICARE

## 2022-11-01 ENCOUNTER — OFFICE VISIT (OUTPATIENT)
Dept: PRIMARY CARE CLINIC | Facility: CLINIC | Age: 52
End: 2022-11-01
Payer: MEDICARE

## 2022-11-01 VITALS
BODY MASS INDEX: 35.92 KG/M2 | SYSTOLIC BLOOD PRESSURE: 126 MMHG | HEIGHT: 62 IN | WEIGHT: 195.19 LBS | HEART RATE: 85 BPM | DIASTOLIC BLOOD PRESSURE: 86 MMHG | TEMPERATURE: 98 F | OXYGEN SATURATION: 98 %

## 2022-11-01 DIAGNOSIS — Z79.899 ENCOUNTER FOR LONG-TERM CURRENT USE OF MEDICATION: ICD-10-CM

## 2022-11-01 DIAGNOSIS — Z12.11 COLON CANCER SCREENING: ICD-10-CM

## 2022-11-01 DIAGNOSIS — F51.04 PSYCHOPHYSIOLOGICAL INSOMNIA: ICD-10-CM

## 2022-11-01 DIAGNOSIS — E66.01 SEVERE OBESITY: Primary | ICD-10-CM

## 2022-11-01 DIAGNOSIS — L30.8 OTHER ECZEMA: ICD-10-CM

## 2022-11-01 LAB
ESTIMATED AVG GLUCOSE: 100 MG/DL (ref 68–131)
HBA1C MFR BLD: 5.1 % (ref 4–5.6)

## 2022-11-01 PROCEDURE — 83036 HEMOGLOBIN GLYCOSYLATED A1C: CPT | Performed by: FAMILY MEDICINE

## 2022-11-01 PROCEDURE — 3079F PR MOST RECENT DIASTOLIC BLOOD PRESSURE 80-89 MM HG: ICD-10-PCS | Mod: CPTII,S$GLB,, | Performed by: FAMILY MEDICINE

## 2022-11-01 PROCEDURE — 3074F SYST BP LT 130 MM HG: CPT | Mod: CPTII,S$GLB,, | Performed by: FAMILY MEDICINE

## 2022-11-01 PROCEDURE — 99999 PR PBB SHADOW E&M-EST. PATIENT-LVL V: ICD-10-PCS | Mod: PBBFAC,,, | Performed by: FAMILY MEDICINE

## 2022-11-01 PROCEDURE — 4010F ACE/ARB THERAPY RXD/TAKEN: CPT | Mod: CPTII,S$GLB,, | Performed by: FAMILY MEDICINE

## 2022-11-01 PROCEDURE — 36415 COLL VENOUS BLD VENIPUNCTURE: CPT | Mod: PN | Performed by: FAMILY MEDICINE

## 2022-11-01 PROCEDURE — 3074F PR MOST RECENT SYSTOLIC BLOOD PRESSURE < 130 MM HG: ICD-10-PCS | Mod: CPTII,S$GLB,, | Performed by: FAMILY MEDICINE

## 2022-11-01 PROCEDURE — 99214 PR OFFICE/OUTPT VISIT, EST, LEVL IV, 30-39 MIN: ICD-10-PCS | Mod: S$GLB,,, | Performed by: FAMILY MEDICINE

## 2022-11-01 PROCEDURE — 99499 RISK ADDL DX/OHS AUDIT: ICD-10-PCS | Mod: S$GLB,,, | Performed by: FAMILY MEDICINE

## 2022-11-01 PROCEDURE — 3079F DIAST BP 80-89 MM HG: CPT | Mod: CPTII,S$GLB,, | Performed by: FAMILY MEDICINE

## 2022-11-01 PROCEDURE — 1159F MED LIST DOCD IN RCRD: CPT | Mod: CPTII,S$GLB,, | Performed by: FAMILY MEDICINE

## 2022-11-01 PROCEDURE — 99214 OFFICE O/P EST MOD 30 MIN: CPT | Mod: S$GLB,,, | Performed by: FAMILY MEDICINE

## 2022-11-01 PROCEDURE — 3008F PR BODY MASS INDEX (BMI) DOCUMENTED: ICD-10-PCS | Mod: CPTII,S$GLB,, | Performed by: FAMILY MEDICINE

## 2022-11-01 PROCEDURE — 84439 ASSAY OF FREE THYROXINE: CPT | Performed by: FAMILY MEDICINE

## 2022-11-01 PROCEDURE — 1159F PR MEDICATION LIST DOCUMENTED IN MEDICAL RECORD: ICD-10-PCS | Mod: CPTII,S$GLB,, | Performed by: FAMILY MEDICINE

## 2022-11-01 PROCEDURE — 84443 ASSAY THYROID STIM HORMONE: CPT | Performed by: FAMILY MEDICINE

## 2022-11-01 PROCEDURE — 99499 UNLISTED E&M SERVICE: CPT | Mod: S$GLB,,, | Performed by: FAMILY MEDICINE

## 2022-11-01 PROCEDURE — 4010F PR ACE/ARB THEARPY RXD/TAKEN: ICD-10-PCS | Mod: CPTII,S$GLB,, | Performed by: FAMILY MEDICINE

## 2022-11-01 PROCEDURE — 99999 PR PBB SHADOW E&M-EST. PATIENT-LVL V: CPT | Mod: PBBFAC,,, | Performed by: FAMILY MEDICINE

## 2022-11-01 PROCEDURE — 3008F BODY MASS INDEX DOCD: CPT | Mod: CPTII,S$GLB,, | Performed by: FAMILY MEDICINE

## 2022-11-01 RX ORDER — SEMAGLUTIDE 1.34 MG/ML
0.25 INJECTION, SOLUTION SUBCUTANEOUS
Qty: 1 PEN | Refills: 5 | Status: SHIPPED | OUTPATIENT
Start: 2022-11-01 | End: 2022-12-01

## 2022-11-01 RX ORDER — TRIAMCINOLONE ACETONIDE 1 MG/G
CREAM TOPICAL 2 TIMES DAILY
Qty: 80 G | Refills: 2 | Status: SHIPPED | OUTPATIENT
Start: 2022-11-01 | End: 2024-01-27

## 2022-11-01 NOTE — PROGRESS NOTES
Subjective:       Patient ID: Irasema Vang is a 51 y.o. female.    Chief Complaint: Obesity and eczema      Abdominal Pain  This is a new problem. The current episode started 1 to 4 weeks ago. The onset quality is sudden. The problem occurs daily. The most recent episode lasted 2 Hours. The problem has been waxing and waning. The pain is located in the LLQ, suprapubic region, left flank and right flank. The pain is at a severity of 9/10. The pain is severe. The quality of the pain is tearing. Associated symptoms include headaches, myalgias and nausea. Pertinent negatives include no anorexia, arthralgias, belching, constipation, diarrhea, dysuria, fever, flatus, frequency, hematochezia, hematuria, melena, vomiting or weight loss. Nothing aggravates the pain. The pain is relieved by Nothing. She has tried acetaminophen for the symptoms. The treatment provided mild relief. Her past medical history is significant for gallstones, GERD and irritable bowel syndrome. There is no history of abdominal surgery, colon cancer, Crohn's disease, pancreatitis, PUD or ulcerative colitis. Patient's medical history includes kidney stones and UTI.    History of Present Illness:   Irasema Vang 51 y.o. female presents today with     Obesity: worsening despite paniculectomy, lipo, exercise and diet which could be better, asking for med for weight loss med. Wants ozempic, denies history of medullary thyroid cancer in fly or personal.  Surgical site for paniculectomy is healing but some keloid at the site still hurts a little.    Depression and PTSD. October is a bad month but she is doing good on medication. Denies SI/HI.    Also with eczema to there back-itchy dry patch.      Past Medical History:   Diagnosis Date    Abnormal Pap smear     history of uterine cancer    Abnormal Pap smear of cervix     Asthma     no meds since age 20    Constipation, chronic     Depression     Family history of nephrolithiasis     General  anesthetics causing adverse effect in therapeutic use     slow to wake up    History of cervical dysplasia 6/27/2013    Hypertension     Menopausal syndrome (hot flashes) 6/27/2013    Nephrolithiasis 6/27/2013    PONV (postoperative nausea and vomiting)     PTSD (post-traumatic stress disorder)     Renal cell carcinoma     Seizures     1 yr ago- caused by migraine meds--no treatment now    Urinoma     Uterine cancer 2000     Family History   Problem Relation Age of Onset    Cancer Maternal Grandmother     Diabetes Brother     Hypertension Mother     Cancer Father     Breast cancer Paternal Aunt     Ovarian cancer Paternal Aunt     Breast cancer Paternal Aunt     Breast cancer Paternal Aunt     Colon cancer Neg Hx      Social History     Socioeconomic History    Marital status:    Tobacco Use    Smoking status: Never    Smokeless tobacco: Never   Substance and Sexual Activity    Alcohol use: Not Currently     Comment: occassionally     Drug use: Yes     Types: Marijuana     Comment: Liquid Cannabis Oil    Sexual activity: Not Currently     Partners: Male     Social Determinants of Health     Financial Resource Strain: Low Risk     Difficulty of Paying Living Expenses: Not hard at all   Food Insecurity: No Food Insecurity    Worried About Running Out of Food in the Last Year: Never true    Ran Out of Food in the Last Year: Never true   Transportation Needs: No Transportation Needs    Lack of Transportation (Medical): No    Lack of Transportation (Non-Medical): No   Physical Activity: Insufficiently Active    Days of Exercise per Week: 1 day    Minutes of Exercise per Session: 30 min   Stress: No Stress Concern Present    Feeling of Stress : Only a little   Social Connections: Moderately Integrated    Frequency of Communication with Friends and Family: More than three times a week    Frequency of Social Gatherings with Friends and Family: Once a week    Attends [employer]ism Services: More than 4 times per year     Active Member of Clubs or Organizations: Yes    Attends Club or Organization Meetings: More than 4 times per year    Marital Status:    Housing Stability: Unknown    Unable to Pay for Housing in the Last Year: No    Unstable Housing in the Last Year: No     Outpatient Encounter Medications as of 11/1/2022   Medication Sig Dispense Refill    busPIRone (BUSPAR) 30 MG Tab Take 1 tablet (30 mg total) by mouth 2 (two) times daily. 60 tablet 3    EPINEPHrine (EPIPEN) 0.3 mg/0.3 mL AtIn Inject into the muscle as needed. 2 each 6    hydroCHLOROthiazide (HYDRODIURIL) 25 MG tablet Take 1 tablet (25 mg total) by mouth once daily. 90 tablet 0    ibuprofen (ADVIL,MOTRIN) 800 MG tablet Take 1 tablet (800 mg total) by mouth every 6 (six) hours as needed for Pain. 30 tablet 1    vilazodone (VIIBRYD) 20 mg Tab Take 1 tablet (20 mg total) by mouth once daily. 30 tablet 3    zolpidem (AMBIEN) 5 MG Tab Take 1/2 to 1 tablet at bedtime as needed for sleep 30 tablet 3    estradiol (DIVIGEL) 1 mg/gram (0.1 %) topical gel Place 1 g onto the skin once daily. 30 g 12    losartan (COZAAR) 50 MG tablet Take 1 tablet (50 mg total) by mouth once daily. 90 tablet 3    omeprazole (PRILOSEC) 40 MG capsule Take 1 capsule (40 mg total) by mouth once daily. 30 capsule 11    semaglutide (OZEMPIC) 0.25 mg or 0.5 mg(2 mg/1.5 mL) pen injector Inject 0.25 mg into the skin every 7 days. 1 pen 5    triamcinolone acetonide 0.1% (KENALOG) 0.1 % cream Apply topically 2 (two) times daily. for 14 days 80 g 2     Facility-Administered Encounter Medications as of 11/1/2022   Medication Dose Route Frequency Provider Last Rate Last Admin    lactated ringers infusion   Intravenous Continuous Darby Monroy MD   Stopped at 09/04/18 1347    nozaseptin (NOZIN) nasal    Each Nostril On Call Procedure Darby Monroy MD   Given at 09/04/18 1019    phenazopyridine tablet 200 mg  200 mg Oral On Call Procedure Darby Monroy MD   200  "mg at 09/04/18 1108       Review of Systems   Constitutional:  Negative for fever and weight loss.   Gastrointestinal:  Positive for abdominal pain and nausea. Negative for anorexia, constipation, diarrhea, flatus, hematochezia, melena and vomiting.   Genitourinary:  Negative for dysuria, frequency and hematuria.   Musculoskeletal:  Positive for myalgias. Negative for arthralgias.   Neurological:  Positive for headaches.     Review of Systems      A complete 10 point ROS was completed and are positive as per above HPI.    Otherwise negative for fever, diplopia, chest pain, shortness of breath, vomiting, blood in urine, joint pain, skin rash, seizures and unusual bleeding.       Objective:      /86 (BP Location: Left arm, Patient Position: Sitting, BP Method: Medium (Manual))   Pulse 85   Temp 98.1 °F (36.7 °C) (Temporal)   Ht 5' 2" (1.575 m)   Wt 88.5 kg (195 lb 3.2 oz)   LMP 01/05/2001   SpO2 98%   BMI 35.70 kg/m²   Physical Exam  Cardiovascular:      Pulses: Normal pulses.      Heart sounds: Normal heart sounds.   Pulmonary:      Effort: Pulmonary effort is normal.      Breath sounds: Normal breath sounds.         CONSTITUTIONAL: No apparent distress. Appears comfortable. Does not appear acutely ill or septic.   CARDIOVASCULAR: No perioral cyanosis  PULMONARY: Breathing unlabored. No retractions Chest expansion grossly normal.  PSYCHIATRIC: Alert and conversant and grossly oriented. Mood is grossly neutral. Affect appropriate.   NEUROLOGIC: No focal sensory deficits reported.     Results for orders placed or performed during the hospital encounter of 06/25/22   Urine culture    Specimen: Urine   Result Value Ref Range    Urine Culture, Routine No growth    HIV 1/2 Ag/Ab (4th Gen)   Result Value Ref Range    HIV 1/2 Ag/Ab Negative Negative   Hepatitis C Antibody   Result Value Ref Range    Hepatitis C Ab Negative Negative   HCV Virus Hold Specimen   Result Value Ref Range    HEP C Virus Hold Specimen " Hold for HCV sendout    Urinalysis, Reflex to Urine Culture Urine, Clean Catch    Specimen: Urine   Result Value Ref Range    Specimen UA Urine, Clean Catch     Color, UA Yellow Yellow, Straw, Marie    Appearance, UA Clear Clear    pH, UA 6.0 5.0 - 8.0    Specific Gravity, UA 1.025 1.005 - 1.030    Protein, UA Negative Negative    Glucose, UA Negative Negative    Ketones, UA Negative Negative    Bilirubin (UA) Negative Negative    Occult Blood UA 1+ (A) Negative    Nitrite, UA Negative Negative    Urobilinogen, UA 1.0 <2.0 EU/dL    Leukocytes, UA 2+ (A) Negative   CBC W/ AUTO DIFFERENTIAL   Result Value Ref Range    WBC 3.91 3.90 - 12.70 K/uL    RBC 4.41 4.00 - 5.40 M/uL    Hemoglobin 12.6 12.0 - 16.0 g/dL    Hematocrit 38.4 37.0 - 48.5 %    MCV 87 82 - 98 fL    MCH 28.6 27.0 - 31.0 pg    MCHC 32.8 32.0 - 36.0 g/dL    RDW 12.1 11.5 - 14.5 %    Platelets 248 150 - 450 K/uL    MPV 9.9 9.2 - 12.9 fL    Immature Granulocytes 0.3 0.0 - 0.5 %    Gran # (ANC) 1.6 (L) 1.8 - 7.7 K/uL    Immature Grans (Abs) 0.01 0.00 - 0.04 K/uL    Lymph # 1.5 1.0 - 4.8 K/uL    Mono # 0.6 0.3 - 1.0 K/uL    Eos # 0.2 0.0 - 0.5 K/uL    Baso # 0.02 0.00 - 0.20 K/uL    nRBC 0 0 /100 WBC    Gran % 41.4 38.0 - 73.0 %    Lymph % 39.4 18.0 - 48.0 %    Mono % 14.1 4.0 - 15.0 %    Eosinophil % 4.3 0.0 - 8.0 %    Basophil % 0.5 0.0 - 1.9 %    Differential Method Automated    Comp. Metabolic Panel   Result Value Ref Range    Sodium 141 136 - 145 mmol/L    Potassium 3.4 (L) 3.5 - 5.1 mmol/L    Chloride 105 95 - 110 mmol/L    CO2 25 23 - 29 mmol/L    Glucose 83 70 - 110 mg/dL    BUN 14 6 - 20 mg/dL    Creatinine 0.8 0.5 - 1.4 mg/dL    Calcium 9.5 8.7 - 10.5 mg/dL    Total Protein 8.4 6.0 - 8.4 g/dL    Albumin 3.7 3.5 - 5.2 g/dL    Total Bilirubin 0.5 0.1 - 1.0 mg/dL    Alkaline Phosphatase 70 55 - 135 U/L    AST 28 10 - 40 U/L    ALT 30 10 - 44 U/L    Anion Gap 11 8 - 16 mmol/L    eGFR if African American >60 >60 mL/min/1.73 m^2    eGFR if non African  American >60 >60 mL/min/1.73 m^2   Lipase   Result Value Ref Range    Lipase 42 4 - 60 U/L   Urinalysis Microscopic   Result Value Ref Range    RBC, UA 2 0 - 4 /hpf    WBC, UA 72 (H) 0 - 5 /hpf    Bacteria Occasional None-Occ /hpf    Squam Epithel, UA 7 /hpf    Microscopic Comment SEE COMMENT      Assessment:       1. Severe obesity    2. Other eczema    3. Psychophysiological insomnia    4. Colon cancer screening    5. BMI 35.0-35.9,adult    6. Encounter for long-term current use of medication        Plan:   Severe obesity  -     semaglutide (OZEMPIC) 0.25 mg or 0.5 mg(2 mg/1.5 mL) pen injector; Inject 0.25 mg into the skin every 7 days.  Dispense: 1 pen; Refill: 5    Other eczema  -     triamcinolone acetonide 0.1% (KENALOG) 0.1 % cream; Apply topically 2 (two) times daily. for 14 days  Dispense: 80 g; Refill: 2    Psychophysiological insomnia    Colon cancer screening  -     Ambulatory referral/consult to Endo Procedure ; Future; Expected date: 11/02/2022    BMI 35.0-35.9,adult  -     semaglutide (OZEMPIC) 0.25 mg or 0.5 mg(2 mg/1.5 mL) pen injector; Inject 0.25 mg into the skin every 7 days.  Dispense: 1 pen; Refill: 5    Encounter for long-term current use of medication  -     TSH; Future; Expected date: 11/01/2022  -     T4, FREE; Future; Expected date: 11/01/2022  -     Hemoglobin A1C; Future; Expected date: 11/01/2022    Remember healthy self care:   Eat right  Attempt adequate rest  Walk or light exercise within reason  Read or explore any of reference materials mentioned  Reach out (i.e., connect with)  others who nuture and bring out best in you  Avoid risky behaviors  Keep your appointments  IF you cannot make your appt THEN please call or go online to reschedule.  Avoid  alcohol and illicit substances.  Look for the positive.  All is often relative-seek balance    Call 911 or go to Emergency Room  (ER)  if any acute concerns  If suicide ideation, please call    SUICIDE Hotline: [phone]  [phone]    [facility] number [phone]  Or  Our office at [phone]    Weight loss is a comprehensive lifestyle intervention: a combination of diet, exercise, and behavioral modification. Set realistic goals for weight loss. The behavioral modification component facilitates adherence to diet and exercise regimens, and includes regular self-monitoring of food intake, physical activity, and weight. Choose a diet rich in fresh fruits, fibers, vegetables, and low-fat dairy products, but low in meats, sweets, and refined grains   Be more active. If you are able, walk for 35 mins 5 times a week.       Treatment options and alternatives were discussed with the patient. Patient was given ample time to ask questions. All questions were answered. Voices understanding and acceptance of this advice. Will call back if any further questions or concerns.      Kaya Pal MD

## 2022-11-02 ENCOUNTER — TELEPHONE (OUTPATIENT)
Dept: PRIMARY CARE CLINIC | Facility: CLINIC | Age: 52
End: 2022-11-02
Payer: MEDICARE

## 2022-11-02 LAB
T4 FREE SERPL-MCNC: 0.92 NG/DL (ref 0.71–1.51)
TSH SERPL DL<=0.005 MIU/L-ACNC: 1.52 UIU/ML (ref 0.4–4)

## 2022-11-02 NOTE — TELEPHONE ENCOUNTER
----- Message from Maggi Romero sent at 11/2/2022  9:38 AM CDT -----  Patient is returning a phone call.  Who left a message for the patient: Kelle  Does patient know what this is regarding:  No  Would you like a call back, or a response through your [facility] portal?:   Either  Comments:

## 2022-11-02 NOTE — TELEPHONE ENCOUNTER
Patient called regarding labs. Patient didn't answer, left message to call office back.      ----- Message from Kaya Pal MD sent at 11/2/2022  8:30 AM CDT -----  Inform pt that result is normal. Both your thyroid function and A1c are normal

## 2022-11-02 NOTE — TELEPHONE ENCOUNTER
----- Message from Kaya Pal MD sent at 11/2/2022  8:30 AM CDT -----  Inform pt that result is normal. Both your thyroid function and A1c are normal

## 2022-11-04 ENCOUNTER — HOSPITAL ENCOUNTER (OUTPATIENT)
Dept: PREADMISSION TESTING | Facility: HOSPITAL | Age: 52
Discharge: HOME | End: 2022-11-04
Attending: FAMILY MEDICINE
Payer: MEDICARE

## 2022-11-04 DIAGNOSIS — Z12.11 COLON CANCER SCREENING: ICD-10-CM

## 2022-12-01 ENCOUNTER — HOSPITAL ENCOUNTER (OUTPATIENT)
Dept: PREADMISSION TESTING | Facility: HOSPITAL | Age: 52
Discharge: HOME | End: 2022-12-01
Attending: FAMILY MEDICINE
Payer: MEDICARE

## 2022-12-01 DIAGNOSIS — Z12.11 COLON CANCER SCREENING: Primary | ICD-10-CM

## 2022-12-02 RX ORDER — POLYETHYLENE GLYCOL 3350, SODIUM SULFATE ANHYDROUS, SODIUM BICARBONATE, SODIUM CHLORIDE, POTASSIUM CHLORIDE 236; 22.74; 6.74; 5.86; 2.97 G/4L; G/4L; G/4L; G/4L; G/4L
4 POWDER, FOR SOLUTION ORAL ONCE
Qty: 4000 ML | Refills: 0 | Status: SHIPPED | OUTPATIENT
Start: 2022-12-02 | End: 2022-12-08

## 2022-12-06 ENCOUNTER — PATIENT MESSAGE (OUTPATIENT)
Dept: PRIMARY CARE CLINIC | Facility: CLINIC | Age: 52
End: 2022-12-06
Payer: OTHER GOVERNMENT

## 2022-12-06 DIAGNOSIS — E87.6 HYPOKALEMIA: ICD-10-CM

## 2022-12-06 RX ORDER — POTASSIUM CHLORIDE 750 MG/1
10 CAPSULE, EXTENDED RELEASE ORAL 2 TIMES DAILY
Qty: 60 CAPSULE | Refills: 5 | Status: SHIPPED | OUTPATIENT
Start: 2022-12-06 | End: 2023-01-07

## 2023-01-03 ENCOUNTER — OFFICE VISIT (OUTPATIENT)
Dept: PSYCHIATRY | Facility: CLINIC | Age: 53
End: 2023-01-03
Payer: OTHER GOVERNMENT

## 2023-01-03 DIAGNOSIS — F41.9 ANXIETY: ICD-10-CM

## 2023-01-03 DIAGNOSIS — F32.A CHRONIC DEPRESSION: Primary | ICD-10-CM

## 2023-01-03 DIAGNOSIS — G47.00 INSOMNIA, UNSPECIFIED TYPE: ICD-10-CM

## 2023-01-03 PROCEDURE — 99214 PR OFFICE/OUTPT VISIT, EST, LEVL IV, 30-39 MIN: ICD-10-PCS | Mod: S$PBB,,, | Performed by: PSYCHIATRY & NEUROLOGY

## 2023-01-03 PROCEDURE — 99212 OFFICE O/P EST SF 10 MIN: CPT | Mod: PBBFAC | Performed by: PSYCHIATRY & NEUROLOGY

## 2023-01-03 PROCEDURE — 99999 PR PBB SHADOW E&M-EST. PATIENT-LVL II: CPT | Mod: PBBFAC,,, | Performed by: PSYCHIATRY & NEUROLOGY

## 2023-01-03 PROCEDURE — 99999 PR PBB SHADOW E&M-EST. PATIENT-LVL II: ICD-10-PCS | Mod: PBBFAC,,, | Performed by: PSYCHIATRY & NEUROLOGY

## 2023-01-03 PROCEDURE — 99214 OFFICE O/P EST MOD 30 MIN: CPT | Mod: S$PBB,,, | Performed by: PSYCHIATRY & NEUROLOGY

## 2023-01-03 RX ORDER — ZOLPIDEM TARTRATE 5 MG/1
TABLET ORAL
Qty: 30 TABLET | Refills: 1 | Status: SHIPPED | OUTPATIENT
Start: 2023-01-03 | End: 2023-03-03 | Stop reason: SDUPTHER

## 2023-01-03 RX ORDER — FLUVOXAMINE MALEATE 100 MG/1
TABLET, COATED ORAL
Qty: 30 TABLET | Refills: 2 | Status: SHIPPED | OUTPATIENT
Start: 2023-01-03 | End: 2023-07-03 | Stop reason: SDUPTHER

## 2023-01-03 RX ORDER — BUPROPION HYDROCHLORIDE 150 MG/1
150 TABLET ORAL DAILY
Qty: 60 TABLET | Refills: 2 | Status: SHIPPED | OUTPATIENT
Start: 2023-01-03 | End: 2023-03-03
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
## 2023-01-05 ENCOUNTER — TELEPHONE (OUTPATIENT)
Dept: ENDOSCOPY | Facility: HOSPITAL | Age: 53
End: 2023-01-05
Payer: OTHER GOVERNMENT

## 2023-01-05 NOTE — TELEPHONE ENCOUNTER
----- Message from Tari Gutierrez sent at 1/4/2023  4:52 PM CST -----  Regarding: reschedule  Contact: patient  Patient would like to reschedule her colonoscopy, please call her back at [phone]. Thanks

## 2023-01-09 ENCOUNTER — HOSPITAL ENCOUNTER (OUTPATIENT)
Dept: RADIOLOGY | Facility: HOSPITAL | Age: 53
Discharge: HOME | End: 2023-01-09
Attending: NURSE PRACTITIONER
Payer: MEDICARE

## 2023-01-09 VITALS — WEIGHT: 195.13 LBS | BODY MASS INDEX: 35.91 KG/M2 | HEIGHT: 62 IN

## 2023-01-09 DIAGNOSIS — Z12.31 VISIT FOR SCREENING MAMMOGRAM: ICD-10-CM

## 2023-01-09 PROCEDURE — 77067 MAMMO DIGITAL SCREENING BILAT WITH TOMO: ICD-10-PCS | Mod: 26,,, | Performed by: RADIOLOGY

## 2023-01-09 PROCEDURE — 77067 SCR MAMMO BI INCL CAD: CPT | Mod: TC

## 2023-01-09 PROCEDURE — 77063 MAMMO DIGITAL SCREENING BILAT WITH TOMO: ICD-10-PCS | Mod: 26,,, | Performed by: RADIOLOGY

## 2023-01-09 PROCEDURE — 77067 SCR MAMMO BI INCL CAD: CPT | Mod: 26,,, | Performed by: RADIOLOGY

## 2023-01-09 PROCEDURE — 77063 BREAST TOMOSYNTHESIS BI: CPT | Mod: 26,,, | Performed by: RADIOLOGY

## 2023-01-17 ENCOUNTER — PATIENT MESSAGE (OUTPATIENT)
Dept: PRIMARY CARE CLINIC | Facility: CLINIC | Age: 53
End: 2023-01-17
Payer: OTHER GOVERNMENT

## 2023-01-23 ENCOUNTER — DOCUMENTATION ONLY (OUTPATIENT)
Dept: PRIMARY CARE CLINIC | Facility: CLINIC | Age: 53
End: 2023-01-23
Payer: OTHER GOVERNMENT

## 2023-01-23 NOTE — PROGRESS NOTES
Subjective:       Patient ID: Irasema Vang is a 52 y.o. female.    Chief Complaint: No chief complaint on file.      HPI   History of Present Illness:   Irasema Vang 52 y.o. female presents today with    I have reviewed record from admit at [facility] and CXR were negative for PE.  CMP was positive for hypokalemia with K of 3.1 and she will need a repeat  bmp    Past Medical History:   Diagnosis Date    Abnormal Pap smear     history of uterine cancer    Abnormal Pap smear of cervix     Asthma     no meds since age 20    Constipation, chronic     Depression     Family history of nephrolithiasis     General anesthetics causing adverse effect in therapeutic use     slow to wake up    History of cervical dysplasia 6/27/2013    Hypertension     Menopausal syndrome (hot flashes) 6/27/2013    Nephrolithiasis 6/27/2013    PONV (postoperative nausea and vomiting)     PTSD (post-traumatic stress disorder)     Renal cell carcinoma     Seizures     1 yr ago- caused by migraine meds--no treatment now    Urinoma     Uterine cancer 2000     Family History   Problem Relation Age of Onset    Cancer Maternal Grandmother     Diabetes Brother     Hypertension Mother     Cancer Father     Breast cancer Paternal Aunt     Ovarian cancer Paternal Aunt     Breast cancer Paternal Aunt     Breast cancer Paternal Aunt     Colon cancer Neg Hx      Social History     Socioeconomic History    Marital status:    Tobacco Use    Smoking status: Never    Smokeless tobacco: Never   Substance and Sexual Activity    Alcohol use: Not Currently     Comment: occassionally     Drug use: Yes     Types: Marijuana     Comment: Liquid Cannabis Oil    Sexual activity: Not Currently     Partners: Male     Outpatient Encounter Medications as of 1/23/2023   Medication Sig Dispense Refill    buPROPion (WELLBUTRIN XL) 150 MG TB24 tablet Take 1 tablet (150 mg total) by mouth once daily. 60 tablet 2    EPINEPHrine (EPIPEN) 0.3 mg/0.3 mL AtIn Inject into  the muscle as needed. 2 each 6    estradiol (DIVIGEL) 1 mg/gram (0.1 %) topical gel Place 1 g onto the skin once daily. 30 g 12    fluvoxaMINE (LUVOX) 100 MG tablet Take 1/2 tablet at bedtime for 7 days then take 1 tablet thereafter. 30 tablet 2    hydroCHLOROthiazide (HYDRODIURIL) 25 MG tablet Take 1 tablet (25 mg total) by mouth once daily. 90 tablet 0    ibuprofen (ADVIL,MOTRIN) 800 MG tablet Take 1 tablet (800 mg total) by mouth every 6 (six) hours as needed for Pain. 30 tablet 1    losartan (COZAAR) 50 MG tablet Take 1 tablet (50 mg total) by mouth once daily. 90 tablet 0    omeprazole (PRILOSEC) 40 MG capsule Take 1 capsule (40 mg total) by mouth once daily. 30 capsule 11    semaglutide (OZEMPIC) 0.25 mg or 0.5 mg(2 mg/1.5 mL) pen injector Inject 0.25 mg into the skin every 7 days 1 pen 5    triamcinolone acetonide 0.1% (KENALOG) 0.1 % cream Apply topically 2 (two) times daily. for 14 days 80 g 2    zolpidem (AMBIEN) 5 MG Tab Take 1/2 to 1 tablet at bedtime as needed for sleep 30 tablet 1     Facility-Administered Encounter Medications as of 1/23/2023   Medication Dose Route Frequency Provider Last Rate Last Admin    lactated ringers infusion   Intravenous Continuous Darby Monroy MD   Stopped at 09/04/18 1347    nozaseptin (NOZIN) nasal    Each Nostril On Call Procedure Darby Monroy MD   Given at 09/04/18 1019    phenazopyridine tablet 200 mg  200 mg Oral On Call Procedure Darby Monroy MD   200 mg at 09/04/18 1108       Review of Systems    Objective:      LMP 01/05/2001   Physical Exam    Results for orders placed or performed in visit on 11/01/22   TSH   Result Value Ref Range    TSH 1.525 0.400 - 4.000 uIU/mL   T4, FREE   Result Value Ref Range    Free T4 0.92 0.71 - 1.51 ng/dL   Hemoglobin A1C   Result Value Ref Range    Hemoglobin A1C 5.1 4.0 - 5.6 %    Estimated Avg Glucose 100 68 - 131 mg/dL     Assessment:       No diagnosis found.    Plan:   There are no  diagnoses linked to this encounter.    Kaya Pal MD

## 2023-01-26 ENCOUNTER — OFFICE VISIT (OUTPATIENT)
Dept: PRIMARY CARE CLINIC | Facility: CLINIC | Age: 53
End: 2023-01-26
Payer: MEDICARE

## 2023-01-26 ENCOUNTER — PATIENT MESSAGE (OUTPATIENT)
Dept: PSYCHIATRY | Facility: CLINIC | Age: 53
End: 2023-01-26
Payer: OTHER GOVERNMENT

## 2023-01-26 DIAGNOSIS — C64.9 RENAL CELL CARCINOMA, UNSPECIFIED LATERALITY: ICD-10-CM

## 2023-01-26 DIAGNOSIS — F33.0 MDD (MAJOR DEPRESSIVE DISORDER), RECURRENT EPISODE, MILD: ICD-10-CM

## 2023-01-26 DIAGNOSIS — E66.01 SEVERE OBESITY: ICD-10-CM

## 2023-01-26 DIAGNOSIS — R59.0 ENLARGED LYMPH NODES IN ARMPIT: Primary | ICD-10-CM

## 2023-01-26 PROCEDURE — 99499 UNLISTED E&M SERVICE: CPT | Mod: 95,,, | Performed by: FAMILY MEDICINE

## 2023-01-26 PROCEDURE — 99214 PR OFFICE/OUTPT VISIT, EST, LEVL IV, 30-39 MIN: ICD-10-PCS | Mod: 95,,, | Performed by: FAMILY MEDICINE

## 2023-01-26 PROCEDURE — 4010F PR ACE/ARB THEARPY RXD/TAKEN: ICD-10-PCS | Mod: CPTII,95,, | Performed by: FAMILY MEDICINE

## 2023-01-26 PROCEDURE — 99499 RISK ADDL DX/OHS AUDIT: ICD-10-PCS | Mod: 95,,, | Performed by: FAMILY MEDICINE

## 2023-01-26 PROCEDURE — 4010F ACE/ARB THERAPY RXD/TAKEN: CPT | Mod: CPTII,95,, | Performed by: FAMILY MEDICINE

## 2023-01-26 PROCEDURE — 99214 OFFICE O/P EST MOD 30 MIN: CPT | Mod: 95,,, | Performed by: FAMILY MEDICINE

## 2023-01-26 RX ORDER — CLINDAMYCIN HYDROCHLORIDE 300 MG/1
300 CAPSULE ORAL EVERY 6 HOURS
Qty: 28 CAPSULE | Refills: 0 | Status: SHIPPED | OUTPATIENT
Start: 2023-01-26 | End: 2023-02-02

## 2023-01-26 NOTE — PROGRESS NOTES
Subjective:    The patient location is: [place] at home  Visit type: Virtual visit with synchronous audio and video  Total time spent with patient: 20 mins  Each patient to whom he or she provides medical services by telemedicine is:  (1) informed of the relationship between the physician and patient and the respective role of any other health care provider with respect to management of the patient; and (2) notified that he or she may decline to receive medical services by telemedicine and may withdraw from such care at any time.     Patient ID: Irasema Vang is a 52 y.o. female.    Chief Complaint: Enlarged Lymph nodes        History of Present Illness:   Irasema Vang 52 y.o. female presents today with     Anxiety: not quite controlled but med was changed by psych changed her med to wellbutirn and buspar a week ago and she is monitoring her sxs.    Recently seen and evaluated at [facility] ER for a PE which was negative but CTA incidentally showed bilateral enlarged axillary LAD which was concerning. Per pt, she had noticed the painful LAD a month ago.  I have reviewed the ER report and CT report vicente axi LAD and the largest is 10 X 14mm to the left.    UTD on mammo       Exam Completion Date:01/16/2023 03:07  EXAM: BBXR CHEST PORTABLE CLINICAL HISTORY: R07.9: CHEST PAIN, UNSPECIFIED FINDINGS: Comparison is made to October 18, 2017. Mediastinal silhouette is within normal limits. The lungs are clear. No pneumothorax or pleural effusion. No acute osseous finding. IMPRESSION: No acute finding the chest. Report Date: 1/16/2023 7:07 AM Electronically Signed By: CLARITA POND Date: 01/16/2023 07:07         Order: BBCT ANGIO CHEST     Exam Completion Date:01/16/2023 03:06  EXAM: CTA ANGIO CHEST W CONTRAST CLINICAL HISTORY: Chest pain, unspecified COMPARISON STUDIES: Chest x-ray 01/16/2023 TECHNIQUE: CT angiogram performed of the chest following administration of IV contrast. Multiplanar MIP reformations  performed. FINDINGS: Heart: The heart is normal size and unremarkable. There are no pericardial effusions. Small amount of coronary arterial calcifications. Mediastinum: There is no mediastinal adenopathy. Vasculature: The vasculature is unremarkable. No pulmonary emboli. No significant aortic atherosclerosis. Lungs: No pleural effusions. No acute appearing infiltrates. Patchy areas of haziness at the dependent portions of the lungs more likely related to atelectasis. Visualized Upper Abdomen: Punctate left renal cortical calcification. Bones: The bones appear unremarkable. Chest Wall: There are mildly enlarged bilateral axillary lymph nodes. A representative left axillary lymph node is 14 x 10 mm. IMPRESSION: No acute findings. Specifically, no evidence of pulmonary emboli. Low-grade bilateral axillary lymphadenopathy.. All CT scans at [this location] are performed using dose modulation techniques as appropriate to a performed exam including the following: automated exposure control; adjustment of the mA and/or kV according to patient size (this includes techniques or standardized protocols for targeted exams where dose is matched to indication / reason for exam; i.e. extremities or head); use of iterative reconstruction technique. Finalized on: 1/16/2023 5:39 AM By: Kierra Fernandez MD [facility]# [id number] 2023-01-16 05:42:[id number] [facility] Electronically Signed By: KIERRA FERNANDEZ Date: 01/16/2023 05:39       Past Medical History:   Diagnosis Date    Abnormal Pap smear     history of uterine cancer    Abnormal Pap smear of cervix     Asthma     no meds since age 20    Constipation, chronic     Depression     Family history of nephrolithiasis     General anesthetics causing adverse effect in therapeutic use     slow to wake up    History of cervical dysplasia 6/27/2013    Hypertension     Menopausal syndrome (hot flashes) 6/27/2013    Nephrolithiasis 6/27/2013    PONV (postoperative nausea and vomiting)     PTSD (post-traumatic  stress disorder)     Renal cell carcinoma     Seizures     1 yr ago- caused by migraine meds--no treatment now    Urinoma     Uterine cancer 2000     Family History   Problem Relation Age of Onset    Cancer Maternal Grandmother     Diabetes Brother     Hypertension Mother     Cancer Father     Breast cancer Paternal Aunt     Ovarian cancer Paternal Aunt     Breast cancer Paternal Aunt     Breast cancer Paternal Aunt     Colon cancer Neg Hx      Social History     Socioeconomic History    Marital status:    Tobacco Use    Smoking status: Never    Smokeless tobacco: Never   Substance and Sexual Activity    Alcohol use: Not Currently     Comment: occassionally     Drug use: Yes     Types: Marijuana     Comment: Liquid Cannabis Oil    Sexual activity: Not Currently     Partners: Male     Outpatient Encounter Medications as of 1/26/2023   Medication Sig Dispense Refill    buPROPion (WELLBUTRIN XL) 150 MG TB24 tablet Take 1 tablet (150 mg total) by mouth once daily. 60 tablet 2    clindamycin (CLEOCIN) 300 MG capsule Take 1 capsule (300 mg total) by mouth every 6 (six) hours. for 7 days 28 capsule 0    EPINEPHrine (EPIPEN) 0.3 mg/0.3 mL AtIn Inject into the muscle as needed. 2 each 6    estradiol (DIVIGEL) 1 mg/gram (0.1 %) topical gel Place 1 g onto the skin once daily. 30 g 12    fluvoxaMINE (LUVOX) 100 MG tablet Take 1/2 tablet at bedtime for 7 days then take 1 tablet thereafter. 30 tablet 2    hydroCHLOROthiazide (HYDRODIURIL) 25 MG tablet Take 1 tablet (25 mg total) by mouth once daily. 90 tablet 0    ibuprofen (ADVIL,MOTRIN) 800 MG tablet Take 1 tablet (800 mg total) by mouth every 6 (six) hours as needed for Pain. 30 tablet 1    losartan (COZAAR) 50 MG tablet Take 1 tablet (50 mg total) by mouth once daily. 90 tablet 0    omeprazole (PRILOSEC) 40 MG capsule Take 1 capsule (40 mg total) by mouth once daily. 30 capsule 11    semaglutide (OZEMPIC) 0.25 mg or 0.5 mg(2 mg/1.5 mL) pen injector Inject 0.25 mg into  the skin every 7 days 1 pen 5    triamcinolone acetonide 0.1% (KENALOG) 0.1 % cream Apply topically 2 (two) times daily. for 14 days 80 g 2    zolpidem (AMBIEN) 5 MG Tab Take 1/2 to 1 tablet at bedtime as needed for sleep 30 tablet 1     Facility-Administered Encounter Medications as of 1/26/2023   Medication Dose Route Frequency Provider Last Rate Last Admin    lactated ringers infusion   Intravenous Continuous Darby Monroy MD   Stopped at 09/04/18 1347    nozaseptin (NOZIN) nasal    Each Nostril On Call Procedure Darby Monroy MD   Given at 09/04/18 1019    phenazopyridine tablet 200 mg  200 mg Oral On Call Procedure Darby Monroy MD   200 mg at 09/04/18 1108       Review of Systems    Review of Systems      A complete 10 point ROS was completed and are positive as per above HPI.    Otherwise negative for fever, diplopia, chest pain, shortness of breath, vomiting, blood in urine, joint pain, skin rash, seizures and unusual bleeding.     Objective:      LMP 01/05/2001   Physical Exam  Lymphadenopathy:      Comments: Axillary LAD-TTP       CONSTITUTIONAL: No apparent distress. Appears comfortable. Does not appear acutely ill or septic. Appears adequately hydrated.  CARDIOVASCULAR: No perioral cyanosis  PULMONARY: Breathing unlabored. No retractions Chest expansion grossly normal.  PSYCHIATRIC: Alert and conversant and grossly oriented. Mood is grossly neutral. Affect appropriate. Judgment and insight grossly intact.  NEUROLOGIC: No focal sensory deficits reported.   Results for orders placed or performed in visit on 11/01/22   TSH   Result Value Ref Range    TSH 1.525 0.400 - 4.000 uIU/mL   T4, FREE   Result Value Ref Range    Free T4 0.92 0.71 - 1.51 ng/dL   Hemoglobin A1C   Result Value Ref Range    Hemoglobin A1C 5.1 4.0 - 5.6 %    Estimated Avg Glucose 100 68 - 131 mg/dL     Assessment:       1. Enlarged lymph nodes in armpit    2. Severe obesity    3. Renal cell  carcinoma, unspecified laterality    4. MDD (major depressive disorder), recurrent episode, mild        Plan:   Enlarged lymph nodes in armpit  -     clindamycin (CLEOCIN) 300 MG capsule; Take 1 capsule (300 mg total) by mouth every 6 (six) hours. for 7 days  Dispense: 28 capsule; Refill: 0  -     US Soft Tissue Axilla, Right; Future; Expected date: 01/26/2023  -     US Soft Tissue Axilla, Left; Future; Expected date: 01/26/2023    Severe obesity    Renal cell carcinoma, unspecified laterality    MDD (major depressive disorder), recurrent episode, mild      I have reviewed all of the patient's clinical history available in care everywhere and Epic and have utilized this in my evaluation and management recommendations today.     Complete abx first and US in 2 weeks if sxs still present.  Treatment options and alternatives were discussed with the patient. Patient was given ample time to ask questions. All questions were answered. Voices understanding and acceptance of this advice. Will call back if any further questions or concerns.    Kaya Pal MD

## 2023-02-01 ENCOUNTER — OFFICE VISIT (OUTPATIENT)
Dept: PRIMARY CARE CLINIC | Facility: CLINIC | Age: 53
End: 2023-02-01
Payer: MEDICARE

## 2023-02-01 ENCOUNTER — PATIENT MESSAGE (OUTPATIENT)
Dept: PRIMARY CARE CLINIC | Facility: CLINIC | Age: 53
End: 2023-02-01

## 2023-02-01 ENCOUNTER — PATIENT OUTREACH (OUTPATIENT)
Dept: ADMINISTRATIVE | Facility: OTHER | Age: 53
End: 2023-02-01
Payer: OTHER GOVERNMENT

## 2023-02-01 ENCOUNTER — TELEPHONE (OUTPATIENT)
Dept: PRIMARY CARE CLINIC | Facility: CLINIC | Age: 53
End: 2023-02-01
Payer: OTHER GOVERNMENT

## 2023-02-01 VITALS
DIASTOLIC BLOOD PRESSURE: 82 MMHG | HEART RATE: 97 BPM | BODY MASS INDEX: 33.38 KG/M2 | OXYGEN SATURATION: 98 % | WEIGHT: 181.38 LBS | SYSTOLIC BLOOD PRESSURE: 116 MMHG | TEMPERATURE: 98 F | HEIGHT: 62 IN

## 2023-02-01 DIAGNOSIS — R73.03 PREDIABETES: Primary | ICD-10-CM

## 2023-02-01 PROCEDURE — 3074F SYST BP LT 130 MM HG: CPT | Mod: CPTII,S$GLB,, | Performed by: NURSE PRACTITIONER

## 2023-02-01 PROCEDURE — 99214 PR OFFICE/OUTPT VISIT, EST, LEVL IV, 30-39 MIN: ICD-10-PCS | Mod: S$GLB,,, | Performed by: NURSE PRACTITIONER

## 2023-02-01 PROCEDURE — 3008F BODY MASS INDEX DOCD: CPT | Mod: CPTII,S$GLB,, | Performed by: NURSE PRACTITIONER

## 2023-02-01 PROCEDURE — 4010F ACE/ARB THERAPY RXD/TAKEN: CPT | Mod: CPTII,S$GLB,, | Performed by: NURSE PRACTITIONER

## 2023-02-01 PROCEDURE — 3079F PR MOST RECENT DIASTOLIC BLOOD PRESSURE 80-89 MM HG: ICD-10-PCS | Mod: CPTII,S$GLB,, | Performed by: NURSE PRACTITIONER

## 2023-02-01 PROCEDURE — 1159F PR MEDICATION LIST DOCUMENTED IN MEDICAL RECORD: ICD-10-PCS | Mod: CPTII,S$GLB,, | Performed by: NURSE PRACTITIONER

## 2023-02-01 PROCEDURE — 1159F MED LIST DOCD IN RCRD: CPT | Mod: CPTII,S$GLB,, | Performed by: NURSE PRACTITIONER

## 2023-02-01 PROCEDURE — 4010F PR ACE/ARB THEARPY RXD/TAKEN: ICD-10-PCS | Mod: CPTII,S$GLB,, | Performed by: NURSE PRACTITIONER

## 2023-02-01 PROCEDURE — 99214 OFFICE O/P EST MOD 30 MIN: CPT | Mod: S$GLB,,, | Performed by: NURSE PRACTITIONER

## 2023-02-01 PROCEDURE — 99999 PR PBB SHADOW E&M-EST. PATIENT-LVL IV: CPT | Mod: PBBFAC,,, | Performed by: NURSE PRACTITIONER

## 2023-02-01 PROCEDURE — 99999 PR PBB SHADOW E&M-EST. PATIENT-LVL IV: ICD-10-PCS | Mod: PBBFAC,,, | Performed by: NURSE PRACTITIONER

## 2023-02-01 PROCEDURE — 3074F PR MOST RECENT SYSTOLIC BLOOD PRESSURE < 130 MM HG: ICD-10-PCS | Mod: CPTII,S$GLB,, | Performed by: NURSE PRACTITIONER

## 2023-02-01 PROCEDURE — 3008F PR BODY MASS INDEX (BMI) DOCUMENTED: ICD-10-PCS | Mod: CPTII,S$GLB,, | Performed by: NURSE PRACTITIONER

## 2023-02-01 PROCEDURE — 3079F DIAST BP 80-89 MM HG: CPT | Mod: CPTII,S$GLB,, | Performed by: NURSE PRACTITIONER

## 2023-02-01 RX ORDER — SEMAGLUTIDE 1.34 MG/ML
0.5 INJECTION, SOLUTION SUBCUTANEOUS
Qty: 1 PEN | Refills: 1 | Status: SHIPPED | OUTPATIENT
Start: 2023-02-01 | End: 2023-03-10

## 2023-02-01 RX ORDER — SEMAGLUTIDE 1.34 MG/ML
1 INJECTION, SOLUTION SUBCUTANEOUS
Qty: 1 PEN | Refills: 2 | Status: SHIPPED | OUTPATIENT
Start: 2023-03-01 | End: 2023-05-29

## 2023-02-01 NOTE — TELEPHONE ENCOUNTER
----- Message from Dulce Lunsford sent at 2/1/2023 10:16 AM CST -----  Contact: pt [phone]  Patient states that she will make it to her appt today.    Thank you

## 2023-02-01 NOTE — PROGRESS NOTES
CHW - Initial Contact    This Community Health Worker completed the Social Determinant of Health questionnaire with patient during clinic visit today.    Pt identified barriers of most importance are. Patient shared no major barriers.   Referrals to community agencies completed with patient consent outside of [facility] include. No outside referrals at this time.  Referrals were put through [facility] - no  Support and Services: none  Other information discussed the patient needs help with. No other information at this time.   Follow up required: Yes  Follow-up Outreach - Due: 2/8/2023

## 2023-02-03 NOTE — PROGRESS NOTES
Subjective:       Patient ID: Irasema Vang is a 52 y.o. female.    Chief Complaint: Other Misc (Discuss Med refills, upper back pain)      History of Present Illness:   Irasema Vang 52 y.o. female presents today with report of upper back pain and for medication management and refills for upper back pain. According to patient she has been having issues with her back for more than 3 months. Denies any known injury or trauma to area. Treatment options and alternatives were discussed with the patient. Patient provided opportunity to ask additional questions.  All questions were answered. Voices understanding and acceptance of this advice. Instructed to call back if any further questions or concerns. I had the opportunity to review patient's medical records including care everywhere, labs, and medication reconciliation to determine medical decision making.      Past Medical History:   Diagnosis Date    Abnormal Pap smear     history of uterine cancer    Abnormal Pap smear of cervix     Asthma     no meds since age 20    Constipation, chronic     Depression     Family history of nephrolithiasis     General anesthetics causing adverse effect in therapeutic use     slow to wake up    History of cervical dysplasia 6/27/2013    Hypertension     Menopausal syndrome (hot flashes) 6/27/2013    Nephrolithiasis 6/27/2013    PONV (postoperative nausea and vomiting)     PTSD (post-traumatic stress disorder)     Renal cell carcinoma     Seizures     1 yr ago- caused by migraine meds--no treatment now    Urinoma     Uterine cancer 2000     Family History   Problem Relation Age of Onset    Cancer Maternal Grandmother     Diabetes Brother     Hypertension Mother     Cancer Father     Breast cancer Paternal Aunt     Ovarian cancer Paternal Aunt     Breast cancer Paternal Aunt     Breast cancer Paternal Aunt     Colon cancer Neg Hx      Social History     Socioeconomic History    Marital status:    Tobacco Use    Smoking  status: Never    Smokeless tobacco: Never   Substance and Sexual Activity    Alcohol use: Not Currently     Comment: occassionally     Drug use: Yes     Types: Marijuana     Comment: Liquid Cannabis Oil    Sexual activity: Not Currently     Partners: Male     Social Determinants of Health     Financial Resource Strain: Low Risk     Difficulty of Paying Living Expenses: Not very hard   Food Insecurity: No Food Insecurity    Worried About Running Out of Food in the Last Year: Never true    Ran Out of Food in the Last Year: Never true   Transportation Needs: No Transportation Needs    Lack of Transportation (Medical): No    Lack of Transportation (Non-Medical): No   Physical Activity: Inactive    Days of Exercise per Week: 0 days    Minutes of Exercise per Session: 0 min   Stress: Stress Concern Present    Feeling of Stress : To some extent   Social Connections: Moderately Isolated    Frequency of Communication with Friends and Family: More than three times a week    Frequency of Social Gatherings with Friends and Family: More than three times a week    Attends Restoration Services: More than 4 times per year    Active Member of Clubs or Organizations: No    Attends Club or Organization Meetings: Never    Marital Status:    Housing Stability: Low Risk     Unable to Pay for Housing in the Last Year: No    Number of Places Lived in the Last Year: 2    Unstable Housing in the Last Year: No     Outpatient Encounter Medications as of 2023   Medication Sig Dispense Refill    buPROPion (WELLBUTRIN XL) 150 MG TB24 tablet Take 1 tablet (150 mg total) by mouth once daily. 60 tablet 2    [] clindamycin (CLEOCIN) 300 MG capsule Take 1 capsule (300 mg total) by mouth every 6 (six) hours. for 7 days 28 capsule 0    EPINEPHrine (EPIPEN) 0.3 mg/0.3 mL AtIn Inject into the muscle as needed. 2 each 6    hydroCHLOROthiazide (HYDRODIURIL) 25 MG tablet Take 1 tablet (25 mg total) by mouth once daily. 90 tablet 0     ibuprofen (ADVIL,MOTRIN) 800 MG tablet Take 1 tablet (800 mg total) by mouth every 6 (six) hours as needed for Pain. 30 tablet 1    losartan (COZAAR) 50 MG tablet Take 1 tablet (50 mg total) by mouth once daily. 90 tablet 0    zolpidem (AMBIEN) 5 MG Tab Take 1/2 to 1 tablet at bedtime as needed for sleep 30 tablet 1    [DISCONTINUED] semaglutide (OZEMPIC) 0.25 mg or 0.5 mg(2 mg/1.5 mL) pen injector Inject 0.25 mg into the skin every 7 days 1 pen 5    estradiol (DIVIGEL) 1 mg/gram (0.1 %) topical gel Place 1 g onto the skin once daily. 30 g 12    fluvoxaMINE (LUVOX) 100 MG tablet Take 1/2 tablet at bedtime for 7 days then take 1 tablet thereafter. (Patient not taking: Reported on 2023) 30 tablet 2    omeprazole (PRILOSEC) 40 MG capsule Take 1 capsule (40 mg total) by mouth once daily. 30 capsule 11    [] potassium chloride (MICRO-K) 10 MEQ CpSR Take 1 capsule (10 mEq total) by mouth 2 (two) times daily. 60 capsule 5    semaglutide (OZEMPIC) 0.25 mg or 0.5 mg(2 mg/1.5 mL) pen injector Inject 0.5 mg into the skin every 7 days. 1 pen 1    [START ON 3/1/2023] semaglutide (OZEMPIC) 1 mg/dose (4 mg/3 mL) Inject 1 mg into the skin every 7 days. 1 pen 2    triamcinolone acetonide 0.1% (KENALOG) 0.1 % cream Apply topically 2 (two) times daily. for 14 days 80 g 2     Facility-Administered Encounter Medications as of 2023   Medication Dose Route Frequency Provider Last Rate Last Admin    lactated ringers infusion   Intravenous Continuous Darby Monroy MD   Stopped at 18 1347    nozaseptin (NOZIN) nasal    Each Nostril On Call Procedure Darby Monroy MD   Given at 18 1019    phenazopyridine tablet 200 mg  200 mg Oral On Call Procedure Darby Monroy MD   200 mg at 18 1108       Review of Systems   Constitutional:  Negative for activity change and unexpected weight change.   HENT:  Negative for hearing loss, rhinorrhea and trouble swallowing.    Eyes:   "Negative for discharge and visual disturbance.   Respiratory:  Negative for chest tightness and wheezing.    Cardiovascular:  Negative for chest pain and palpitations.   Gastrointestinal:  Negative for blood in stool, constipation, diarrhea and vomiting.   Endocrine: Negative for polydipsia and polyuria.   Genitourinary:  Negative for difficulty urinating, dysuria, hematuria and menstrual problem.   Musculoskeletal:  Positive for back pain. Negative for arthralgias, joint swelling and neck pain.   Neurological:  Negative for weakness and headaches.   Psychiatric/Behavioral:  Negative for confusion and dysphoric mood.      Objective:      /82 (BP Location: Left arm, Patient Position: Sitting, BP Method: Medium (Manual))   Pulse 97   Temp 97.5 °F (36.4 °C) (Temporal)   Ht 5' 2" (1.575 m)   Wt 82.3 kg (181 lb 6.4 oz)   LMP 01/05/2001   SpO2 98%   BMI 33.18 kg/m²   Physical Exam  Vitals and nursing note reviewed.   Constitutional:       General: She is not in acute distress.     Appearance: Normal appearance. She is normal weight. She is not ill-appearing or toxic-appearing.   Cardiovascular:      Rate and Rhythm: Normal rate and regular rhythm.      Pulses: Normal pulses.      Heart sounds: Normal heart sounds.   Pulmonary:      Effort: Pulmonary effort is normal.      Breath sounds: Normal breath sounds.   Musculoskeletal:      Thoracic back: Spasms and tenderness present.      Lumbar back: Spasms and tenderness present.   Neurological:      Mental Status: She is alert.       Results for orders placed or performed in visit on 11/01/22   TSH   Result Value Ref Range    TSH 1.525 0.400 - 4.000 uIU/mL   T4, FREE   Result Value Ref Range    Free T4 0.92 0.71 - 1.51 ng/dL   Hemoglobin A1C   Result Value Ref Range    Hemoglobin A1C 5.1 4.0 - 5.6 %    Estimated Avg Glucose 100 68 - 131 mg/dL     Assessment:       1. Prediabetes          Plan:   Irasema was seen today for other misc.    Diagnoses and all orders for " this visit:    Prediabetes    Other orders  -     semaglutide (OZEMPIC) 0.25 mg or 0.5 mg(2 mg/1.5 mL) pen injector; Inject 0.5 mg into the skin every 7 days.  -     semaglutide (OZEMPIC) 1 mg/dose (4 mg/3 mL); Inject 1 mg into the skin every 7 days.          [facility] [address]  [place] [address]  Office [phone]  Fax [phone]

## 2023-02-09 ENCOUNTER — TELEPHONE (OUTPATIENT)
Dept: PRIMARY CARE CLINIC | Facility: CLINIC | Age: 53
End: 2023-02-09
Payer: OTHER GOVERNMENT

## 2023-02-09 ENCOUNTER — HOSPITAL ENCOUNTER (OUTPATIENT)
Dept: RADIOLOGY | Facility: HOSPITAL | Age: 53
Discharge: HOME | End: 2023-02-09
Attending: FAMILY MEDICINE
Payer: MEDICARE

## 2023-02-09 DIAGNOSIS — R59.0 ENLARGED LYMPH NODES IN ARMPIT: ICD-10-CM

## 2023-02-09 PROCEDURE — 76882 US LMTD JT/FCL EVL NVASC XTR: CPT | Mod: TC,LT

## 2023-02-09 PROCEDURE — 76882 US LMTD JT/FCL EVL NVASC XTR: CPT | Mod: TC,RT

## 2023-02-09 PROCEDURE — 76882 US SOFT TISSUE AXILLA, RIGHT: ICD-10-PCS | Mod: 26,RT,, | Performed by: RADIOLOGY

## 2023-02-09 PROCEDURE — 76882 US SOFT TISSUE AXILLA, LEFT: ICD-10-PCS | Mod: 26,LT,, | Performed by: RADIOLOGY

## 2023-02-09 PROCEDURE — 76882 US LMTD JT/FCL EVL NVASC XTR: CPT | Mod: 26,RT,, | Performed by: RADIOLOGY

## 2023-02-09 PROCEDURE — 76882 US LMTD JT/FCL EVL NVASC XTR: CPT | Mod: 26,LT,, | Performed by: RADIOLOGY

## 2023-02-09 NOTE — PROGRESS NOTES
You do have lymph nodes and they look benign.  Now that you have completed the antibiotic, can you still feel them and are they still painful? If so, let us know to guide the next plan of care.  Thank you

## 2023-02-09 NOTE — TELEPHONE ENCOUNTER
Patient called regarding US. Patient informed of results , patient stated she she is not in pain or can feel lymph nodes. Patient informed I will inform provider. Patient voiced understanding.  ----- Message from Kaya Pal MD sent at 2/9/2023  9:24 AM CST -----  You do have lymph nodes and they look benign.  Now that you have completed the antibiotic, can you still feel them and are they still painful? If so, let us know to guide the next plan of care.  Thank you

## 2023-02-10 ENCOUNTER — PATIENT OUTREACH (OUTPATIENT)
Dept: ADMINISTRATIVE | Facility: OTHER | Age: 53
End: 2023-02-10
Payer: OTHER GOVERNMENT

## 2023-02-10 NOTE — PROGRESS NOTES
CHW - Case Closure    This Community Health Worker spoke to patient via telephone today.   Pt reported. No new information.  Pt denied any additional needs at this time and agrees with episode closure at this time.  Provided patient with Community Health Worker's contact information and encouraged her to contact this Community Health Worker if additional needs arise.

## 2023-02-14 ENCOUNTER — TELEPHONE (OUTPATIENT)
Dept: PRIMARY CARE CLINIC | Facility: CLINIC | Age: 53
End: 2023-02-14
Payer: OTHER GOVERNMENT

## 2023-02-22 ENCOUNTER — PES CALL (OUTPATIENT)
Dept: ADMINISTRATIVE | Facility: CLINIC | Age: 53
End: 2023-02-22
Payer: OTHER GOVERNMENT

## 2023-02-27 ENCOUNTER — PATIENT MESSAGE (OUTPATIENT)
Dept: PRIMARY CARE CLINIC | Facility: CLINIC | Age: 53
End: 2023-02-27
Payer: OTHER GOVERNMENT

## 2023-02-28 ENCOUNTER — E-CONSULT (OUTPATIENT)
Dept: HEMATOLOGY/ONCOLOGY | Facility: CLINIC | Age: 53
End: 2023-02-28
Payer: MEDICARE

## 2023-02-28 ENCOUNTER — OFFICE VISIT (OUTPATIENT)
Dept: PRIMARY CARE CLINIC | Facility: CLINIC | Age: 53
End: 2023-02-28
Payer: MEDICARE

## 2023-02-28 DIAGNOSIS — Z85.528 HISTORY OF RENAL CELL CARCINOMA: ICD-10-CM

## 2023-02-28 DIAGNOSIS — R59.0 LYMPHADENOPATHY, AXILLARY: Primary | ICD-10-CM

## 2023-02-28 DIAGNOSIS — N30.01 ACUTE CYSTITIS WITH HEMATURIA: ICD-10-CM

## 2023-02-28 DIAGNOSIS — R59.0 LAD (LYMPHADENOPATHY), AXILLARY: Primary | ICD-10-CM

## 2023-02-28 PROCEDURE — 99451 NTRPROF PH1/NTRNET/EHR 5/>: CPT | Mod: S$GLB,,, | Performed by: INTERNAL MEDICINE

## 2023-02-28 PROCEDURE — 4010F ACE/ARB THERAPY RXD/TAKEN: CPT | Mod: CPTII,95,, | Performed by: FAMILY MEDICINE

## 2023-02-28 PROCEDURE — 4010F PR ACE/ARB THEARPY RXD/TAKEN: ICD-10-PCS | Mod: CPTII,95,, | Performed by: FAMILY MEDICINE

## 2023-02-28 PROCEDURE — 99214 OFFICE O/P EST MOD 30 MIN: CPT | Mod: 95,,, | Performed by: FAMILY MEDICINE

## 2023-02-28 PROCEDURE — 99214 PR OFFICE/OUTPT VISIT, EST, LEVL IV, 30-39 MIN: ICD-10-PCS | Mod: 95,,, | Performed by: FAMILY MEDICINE

## 2023-02-28 PROCEDURE — 99451 PR INTERPROF, PHONE/INTERNET/EHR, CONSULT, >= 5MINS: ICD-10-PCS | Mod: S$GLB,,, | Performed by: INTERNAL MEDICINE

## 2023-02-28 RX ORDER — CEFDINIR 300 MG/1
300 CAPSULE ORAL 2 TIMES DAILY
Qty: 14 CAPSULE | Refills: 0 | Status: SHIPPED | OUTPATIENT
Start: 2023-02-28 | End: 2023-03-08

## 2023-02-28 NOTE — PROGRESS NOTES
Subjective:    The patient location is: [place] at home  Visit type: Virtual visit with synchronous audio and video  Total time spent with patient:20 mins  Each patient to whom he or she provides medical services by telemedicine is:  (1) informed of the relationship between the physician and patient and the respective role of any other health care provider with respect to management of the patient; and (2) notified that he or she may decline to receive medical services by telemedicine and may withdraw from such care at any time.     Patient ID: Irasema Vang is a 52 y.o. female.    Chief Complaint: LAD        History of Present Illness:   Irasema Vang 52 y.o. female presents today with   Persistent Left axillary LAD, getting larger. Completed a course of abx. Non tender. US showed enlarged Lymph nodes bilaterally.  She has history of Left renal cell carcinoma in 2017 and sees Dr Camreon Gamino.    Also c/o UTI x one week. Dysuria with hematuria but hematuria has been present since the ca.    Past Medical History:   Diagnosis Date    Abnormal Pap smear     history of uterine cancer    Abnormal Pap smear of cervix     Asthma     no meds since age 20    Constipation, chronic     Depression     Family history of nephrolithiasis     General anesthetics causing adverse effect in therapeutic use     slow to wake up    History of cervical dysplasia 6/27/2013    Hypertension     Menopausal syndrome (hot flashes) 6/27/2013    Nephrolithiasis 6/27/2013    PONV (postoperative nausea and vomiting)     PTSD (post-traumatic stress disorder)     Renal cell carcinoma     Seizures     1 yr ago- caused by migraine meds--no treatment now    Urinoma     Uterine cancer 2000     Family History   Problem Relation Age of Onset    Cancer Maternal Grandmother     Diabetes Brother     Hypertension Mother     Cancer Father     Breast cancer Paternal Aunt     Ovarian cancer Paternal Aunt     Breast cancer Paternal Aunt     Breast  cancer Paternal Aunt     Colon cancer Neg Hx      Social History     Socioeconomic History    Marital status:    Tobacco Use    Smoking status: Never    Smokeless tobacco: Never   Substance and Sexual Activity    Alcohol use: Not Currently     Comment: occassionally     Drug use: Yes     Types: Marijuana     Comment: Liquid Cannabis Oil    Sexual activity: Not Currently     Partners: Male     Social Determinants of Health     Financial Resource Strain: Low Risk     Difficulty of Paying Living Expenses: Not very hard   Food Insecurity: No Food Insecurity    Worried About Running Out of Food in the Last Year: Never true    Ran Out of Food in the Last Year: Never true   Transportation Needs: No Transportation Needs    Lack of Transportation (Medical): No    Lack of Transportation (Non-Medical): No   Physical Activity: Inactive    Days of Exercise per Week: 0 days    Minutes of Exercise per Session: 0 min   Stress: Stress Concern Present    Feeling of Stress : To some extent   Social Connections: Moderately Isolated    Frequency of Communication with Friends and Family: More than three times a week    Frequency of Social Gatherings with Friends and Family: More than three times a week    Attends [employer] Services: More than 4 times per year    Active Member of Clubs or Organizations: No    Attends Club or Organization Meetings: Never    Marital Status:    Housing Stability: Low Risk     Unable to Pay for Housing in the Last Year: No    Number of Places Lived in the Last Year: 2    Unstable Housing in the Last Year: No     Outpatient Encounter Medications as of 2/28/2023   Medication Sig Dispense Refill    buPROPion (WELLBUTRIN XL) 150 MG TB24 tablet Take 1 tablet (150 mg total) by mouth once daily. 60 tablet 2    cefdinir (OMNICEF) 300 MG capsule Take 1 capsule (300 mg total) by mouth 2 (two) times daily. for 7 days 14 capsule 0    EPINEPHrine (EPIPEN) 0.3 mg/0.3 mL AtIn Inject into the muscle as  needed. 2 each 6    estradiol (DIVIGEL) 1 mg/gram (0.1 %) topical gel Place 1 g onto the skin once daily. 30 g 12    fluvoxaMINE (LUVOX) 100 MG tablet Take 1/2 tablet at bedtime for 7 days then take 1 tablet thereafter. (Patient not taking: Reported on 2/1/2023) 30 tablet 2    hydroCHLOROthiazide (HYDRODIURIL) 25 MG tablet Take 1 tablet (25 mg total) by mouth once daily. 90 tablet 0    ibuprofen (ADVIL,MOTRIN) 800 MG tablet Take 1 tablet (800 mg total) by mouth every 6 (six) hours as needed for Pain. 30 tablet 1    losartan (COZAAR) 50 MG tablet Take 1 tablet (50 mg total) by mouth once daily. 90 tablet 0    omeprazole (PRILOSEC) 40 MG capsule Take 1 capsule (40 mg total) by mouth once daily. 30 capsule 11    semaglutide (OZEMPIC) 0.25 mg or 0.5 mg(2 mg/1.5 mL) pen injector Inject 0.5 mg into the skin every 7 days. 1 pen 1    [START ON 3/1/2023] semaglutide (OZEMPIC) 1 mg/dose (4 mg/3 mL) Inject 1 mg into the skin every 7 days. 1 pen 2    triamcinolone acetonide 0.1% (KENALOG) 0.1 % cream Apply topically 2 (two) times daily. for 14 days 80 g 2    zolpidem (AMBIEN) 5 MG Tab Take 1/2 to 1 tablet at bedtime as needed for sleep 30 tablet 1     Facility-Administered Encounter Medications as of 2/28/2023   Medication Dose Route Frequency Provider Last Rate Last Admin    lactated ringers infusion   Intravenous Continuous Darby Monroy MD   Stopped at 09/04/18 1347    nozaseptin (NOZIN) nasal    Each Nostril On Call Procedure Darby Monroy MD   Given at 09/04/18 1019    phenazopyridine tablet 200 mg  200 mg Oral On Call Procedure Darby Monroy MD   200 mg at 09/04/18 1108       Review of Systems   Constitutional:  Negative for activity change and unexpected weight change.   HENT:  Negative for hearing loss, rhinorrhea and trouble swallowing.    Eyes:  Negative for discharge and visual disturbance.   Respiratory:  Negative for chest tightness and wheezing.    Cardiovascular:  Negative  for chest pain and palpitations.   Gastrointestinal:  Positive for constipation. Negative for blood in stool, diarrhea and vomiting.   Endocrine: Positive for polyuria. Negative for polydipsia.   Genitourinary:  Positive for dysuria. Negative for difficulty urinating, hematuria and menstrual problem.   Musculoskeletal:  Negative for arthralgias, joint swelling and neck pain.   Neurological:  Negative for weakness and headaches.   Psychiatric/Behavioral:  Positive for dysphoric mood. Negative for confusion.      Review of Systems      A complete 10 point ROS was completed and are positive as per above HPI.    Otherwise negative for fever, diplopia, chest pain, shortness of breath, vomiting, blood in urine, skin rash, seizures and unusual bleeding.     Objective:      LMP 01/05/2001   Physical Exam    CONSTITUTIONAL: No apparent distress. Appears comfortable. Does not appear acutely ill or septic. Appears adequately hydrated.  CARDIOVASCULAR: No perioral cyanosis  PULMONARY: Breathing unlabored. No retractions Chest expansion grossly normal.  PSYCHIATRIC: Alert and conversant and grossly oriented. Mood is grossly neutral. Affect appropriate. Judgment and insight grossly intact.  NEUROLOGIC: No focal sensory deficits reported.   Results for orders placed or performed in visit on 11/01/22   TSH   Result Value Ref Range    TSH 1.525 0.400 - 4.000 uIU/mL   T4, FREE   Result Value Ref Range    Free T4 0.92 0.71 - 1.51 ng/dL   Hemoglobin A1C   Result Value Ref Range    Hemoglobin A1C 5.1 4.0 - 5.6 %    Estimated Avg Glucose 100 68 - 131 mg/dL     Assessment:       1. LAD (lymphadenopathy), axillary    2. Acute cystitis with hematuria    3. History of renal cell carcinoma        Plan:   LAD (lymphadenopathy), axillary  -     Sedimentation rate; Future; Expected date: 02/28/2023  -     C-Reactive Protein; Future; Expected date: 02/28/2023  -     LACTATE DEHYDROGENASE; Future; Expected date: 02/28/2023  -     JANICE by IFA,  w/Rflx; Future; Expected date: 02/28/2023  -     CBC Auto Differential; Future; Expected date: 02/28/2023  -     CT Chest Abdomen Pelvis W W/O Contrast (XPD); Future; Expected date: 02/28/2023  -     Basic Metabolic Panel; Future; Expected date: 05/31/2023    Acute cystitis with hematuria  -     cefdinir (OMNICEF) 300 MG capsule; Take 1 capsule (300 mg total) by mouth 2 (two) times daily. for 7 days  Dispense: 14 capsule; Refill: 0    History of renal cell carcinoma  -     CT Chest Abdomen Pelvis W W/O Contrast (XPD); Future; Expected date: 02/28/2023    Oncology e consult today.  Will come into the clinic tomorrow.  I have reviewed all of the patient's clinical history available in care everywhere and Epic and have utilized this in my evaluation and management recommendations today.   Treatment options and alternatives were discussed with the patient. Patient was given ample time to ask questions. All questions were answered. Voices understanding and acceptance of this advice. Will call back if any further questions or concerns.  Kaya Pal MD

## 2023-02-28 NOTE — PROGRESS NOTES
[facility] - Hem/Onc  4th Fl  Response for E-Consult     Patient Name: Irasema Vang  MRN: [id number]  Primary Care Provider: [facility]   Requesting Provider: Kaya Pal MD      Findings:  52-year-old female with history of renal carcinoma with bilateral axillary adenopathy.  I would agree I would do mammograms if they have not already been done with breast exam.  In addition I would do CT chest abdomen pelvis previous history of renal cancer.  If nothing to suggest anything abnormal than obviously consideration of axillary biopsy would be appropriate course of action either by surgery or Interventional Radiology    I did not speak to the requesting provider verbally about this.     Total time of Consultation: 15 minute    Percentage of time spent on verbal/written discussion: 50%     *This eConsult is based on the clinical data available to me and is furnished without benefit of a physical examination. The eConsult will need to be interpreted in light of any clinical issues or changes in patient status not available to me at the time of filing this eConsults. Significant changes in patient condition or level of acuity should result in immediate formal consultation and reevaluation. Please alert me if you have further questions.    Thank you for your consult.     Edilson Clifford MD  [facility] - [facility]/On[facility]  [facility]

## 2023-03-01 ENCOUNTER — LAB VISIT (OUTPATIENT)
Dept: LAB | Facility: HOSPITAL | Age: 53
End: 2023-03-01
Attending: FAMILY MEDICINE
Payer: MEDICARE

## 2023-03-01 DIAGNOSIS — R59.0 LAD (LYMPHADENOPATHY), AXILLARY: ICD-10-CM

## 2023-03-01 LAB
BASOPHILS # BLD AUTO: 0.02 K/UL (ref 0–0.2)
BASOPHILS NFR BLD: 0.5 % (ref 0–1.9)
DIFFERENTIAL METHOD: ABNORMAL
EOSINOPHIL # BLD AUTO: 0.3 K/UL (ref 0–0.5)
EOSINOPHIL NFR BLD: 6.8 % (ref 0–8)
ERYTHROCYTE [DISTWIDTH] IN BLOOD BY AUTOMATED COUNT: 13.4 % (ref 11.5–14.5)
ERYTHROCYTE [SEDIMENTATION RATE] IN BLOOD BY PHOTOMETRIC METHOD: 9 MM/HR (ref 0–36)
HCT VFR BLD AUTO: 38.7 % (ref 37–48.5)
HGB BLD-MCNC: 12.3 G/DL (ref 12–16)
IMM GRANULOCYTES # BLD AUTO: 0.01 K/UL (ref 0–0.04)
IMM GRANULOCYTES NFR BLD AUTO: 0.2 % (ref 0–0.5)
LYMPHOCYTES # BLD AUTO: 1.8 K/UL (ref 1–4.8)
LYMPHOCYTES NFR BLD: 42.4 % (ref 18–48)
MCH RBC QN AUTO: 28.5 PG (ref 27–31)
MCHC RBC AUTO-ENTMCNC: 31.8 G/DL (ref 32–36)
MCV RBC AUTO: 90 FL (ref 82–98)
MONOCYTES # BLD AUTO: 0.4 K/UL (ref 0.3–1)
MONOCYTES NFR BLD: 10.7 % (ref 4–15)
NEUTROPHILS # BLD AUTO: 1.6 K/UL (ref 1.8–7.7)
NEUTROPHILS NFR BLD: 39.4 % (ref 38–73)
NRBC BLD-RTO: 0 /100 WBC
PLATELET # BLD AUTO: 262 K/UL (ref 150–450)
PMV BLD AUTO: 10.5 FL (ref 9.2–12.9)
RBC # BLD AUTO: 4.32 M/UL (ref 4–5.4)
WBC # BLD AUTO: 4.13 K/UL (ref 3.9–12.7)

## 2023-03-01 PROCEDURE — 85025 COMPLETE CBC W/AUTO DIFF WBC: CPT | Performed by: FAMILY MEDICINE

## 2023-03-01 PROCEDURE — 36415 COLL VENOUS BLD VENIPUNCTURE: CPT | Mod: PN | Performed by: FAMILY MEDICINE

## 2023-03-01 PROCEDURE — 86038 ANTINUCLEAR ANTIBODIES: CPT | Performed by: FAMILY MEDICINE

## 2023-03-01 PROCEDURE — 86140 C-REACTIVE PROTEIN: CPT | Performed by: FAMILY MEDICINE

## 2023-03-01 PROCEDURE — 83615 LACTATE (LD) (LDH) ENZYME: CPT | Performed by: FAMILY MEDICINE

## 2023-03-01 PROCEDURE — 85652 RBC SED RATE AUTOMATED: CPT | Performed by: FAMILY MEDICINE

## 2023-03-01 PROCEDURE — 86039 ANTINUCLEAR ANTIBODIES (ANA): CPT | Performed by: FAMILY MEDICINE

## 2023-03-02 LAB
ANA PATTERN 1: NORMAL
ANA SER QL IF: POSITIVE
ANA TITR SER IF: NORMAL {TITER}
CRP SERPL-MCNC: 3.4 MG/L (ref 0–8.2)
LDH SERPL L TO P-CCNC: 188 U/L (ref 110–260)

## 2023-03-03 ENCOUNTER — TELEPHONE (OUTPATIENT)
Dept: PRIMARY CARE CLINIC | Facility: CLINIC | Age: 53
End: 2023-03-03
Payer: OTHER GOVERNMENT

## 2023-03-03 ENCOUNTER — OFFICE VISIT (OUTPATIENT)
Dept: PSYCHIATRY | Facility: CLINIC | Age: 53
End: 2023-03-03
Payer: MEDICARE

## 2023-03-03 DIAGNOSIS — F41.9 ANXIETY: ICD-10-CM

## 2023-03-03 DIAGNOSIS — F32.A CHRONIC DEPRESSION: Primary | ICD-10-CM

## 2023-03-03 PROCEDURE — 99214 PR OFFICE/OUTPT VISIT, EST, LEVL IV, 30-39 MIN: ICD-10-PCS | Mod: 95,,, | Performed by: PSYCHIATRY & NEUROLOGY

## 2023-03-03 PROCEDURE — 4010F PR ACE/ARB THEARPY RXD/TAKEN: ICD-10-PCS | Mod: CPTII,95,, | Performed by: PSYCHIATRY & NEUROLOGY

## 2023-03-03 PROCEDURE — 4010F ACE/ARB THERAPY RXD/TAKEN: CPT | Mod: CPTII,95,, | Performed by: PSYCHIATRY & NEUROLOGY

## 2023-03-03 PROCEDURE — 99214 OFFICE O/P EST MOD 30 MIN: CPT | Mod: 95,,, | Performed by: PSYCHIATRY & NEUROLOGY

## 2023-03-03 RX ORDER — ZOLPIDEM TARTRATE 5 MG/1
TABLET ORAL
Qty: 30 TABLET | Refills: 2 | Status: SHIPPED | OUTPATIENT
Start: 2023-03-03 | End: 2024-02-20 | Stop reason: ALTCHOICE

## 2023-03-03 RX ORDER — BUPROPION HYDROCHLORIDE 300 MG/1
300 TABLET ORAL DAILY
Qty: 30 TABLET | Refills: 2 | Status: SHIPPED | OUTPATIENT
Start: 2023-03-03 | End: 2023-07-03 | Stop reason: SDUPTHER
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
## 2023-03-03 NOTE — PROGRESS NOTES
I have reviewed all your lab results.   Inflammatory markers were negative but JANICE which is used for autoimmune is positive. Positive JANICE by itself does not count, so we have to wait on the specifics.

## 2023-03-06 ENCOUNTER — HOSPITAL ENCOUNTER (OUTPATIENT)
Dept: RADIOLOGY | Facility: HOSPITAL | Age: 53
Discharge: HOME | End: 2023-03-06
Attending: FAMILY MEDICINE
Payer: MEDICARE

## 2023-03-06 DIAGNOSIS — R59.0 LAD (LYMPHADENOPATHY), AXILLARY: ICD-10-CM

## 2023-03-06 DIAGNOSIS — Z85.528 HISTORY OF RENAL CELL CARCINOMA: ICD-10-CM

## 2023-03-06 PROCEDURE — 71260 CT THORAX DX C+: CPT | Mod: TC

## 2023-03-06 PROCEDURE — 71260 CT THORAX DX C+: CPT | Mod: 26,,, | Performed by: RADIOLOGY

## 2023-03-06 PROCEDURE — 74177 CT CHEST ABDOMEN PELVIS WITH CONTRAST (XPD): ICD-10-PCS | Mod: 26,,, | Performed by: RADIOLOGY

## 2023-03-06 PROCEDURE — 25500020 PHARM REV CODE 255: Performed by: FAMILY MEDICINE

## 2023-03-06 PROCEDURE — 74177 CT ABD & PELVIS W/CONTRAST: CPT | Mod: 26,,, | Performed by: RADIOLOGY

## 2023-03-06 PROCEDURE — 71260 CT CHEST ABDOMEN PELVIS WITH CONTRAST (XPD): ICD-10-PCS | Mod: 26,,, | Performed by: RADIOLOGY

## 2023-03-06 PROCEDURE — 74177 CT ABD & PELVIS W/CONTRAST: CPT | Mod: TC

## 2023-03-06 RX ADMIN — IOHEXOL 30 ML: 350 INJECTION, SOLUTION INTRAVENOUS at 10:03

## 2023-03-06 RX ADMIN — IOHEXOL 100 ML: 350 INJECTION, SOLUTION INTRAVENOUS at 11:03

## 2023-03-06 NOTE — PROGRESS NOTES
Working [facility].     Per med card pt on losartan.  Snap shot-med dispense history indicates pt last filled 90 day supply, 5/31/22.     Malnutrition...

## 2023-03-07 ENCOUNTER — TELEPHONE (OUTPATIENT)
Dept: PRIMARY CARE CLINIC | Facility: CLINIC | Age: 53
End: 2023-03-07
Payer: OTHER GOVERNMENT

## 2023-03-07 NOTE — PROGRESS NOTES
CT chest abd and pelvis is normal. It shows that the axillary LAD but they do not appear to be cancerous which is good and I can refer you to oncology to get a second opinion and to see if a biopsy is needed. Thanks

## 2023-03-07 NOTE — TELEPHONE ENCOUNTER
Pt called regarding ct. Pt informed of results. Pt voiced understanding.  ----- Message from Kaya Pal MD sent at 3/7/2023  4:03 PM CST -----  CT chest abd and pelvis is normal. It shows that the axillary LAD but they do not appear to be cancerous which is good and I can refer you to oncology to get a second opinion and to see if a biopsy is needed. Thanks

## 2023-03-09 ENCOUNTER — PATIENT MESSAGE (OUTPATIENT)
Dept: PRIMARY CARE CLINIC | Facility: CLINIC | Age: 53
End: 2023-03-09
Payer: OTHER GOVERNMENT

## 2023-03-10 ENCOUNTER — PATIENT OUTREACH (OUTPATIENT)
Dept: ADMINISTRATIVE | Facility: HOSPITAL | Age: 53
End: 2023-03-10
Payer: OTHER GOVERNMENT

## 2023-03-10 NOTE — PROGRESS NOTES
[facility] DM Statin report: Per chart review, patient is not diabetic. Patient is currently not taking a statin, Pravastatin was stopped 4/2022.

## 2023-03-25 ENCOUNTER — PATIENT MESSAGE (OUTPATIENT)
Dept: PRIMARY CARE CLINIC | Facility: CLINIC | Age: 53
End: 2023-03-25
Payer: OTHER GOVERNMENT

## 2023-03-27 DIAGNOSIS — I10 ESSENTIAL HYPERTENSION: ICD-10-CM

## 2023-03-29 RX ORDER — HYDROCHLOROTHIAZIDE 25 MG/1
25 TABLET ORAL DAILY
Qty: 90 TABLET | Refills: 0 | Status: SHIPPED | OUTPATIENT
Start: 2023-03-29 | End: 2023-06-26 | Stop reason: SDUPTHER

## 2023-03-29 RX ORDER — LOSARTAN POTASSIUM 50 MG/1
50 TABLET ORAL DAILY
Qty: 90 TABLET | Refills: 0 | Status: SHIPPED | OUTPATIENT
Start: 2023-03-29 | End: 2023-06-26 | Stop reason: SDUPTHER

## 2023-04-05 ENCOUNTER — PES CALL (OUTPATIENT)
Dept: ADMINISTRATIVE | Facility: CLINIC | Age: 53
End: 2023-04-05
Payer: OTHER GOVERNMENT

## 2023-05-15 ENCOUNTER — PATIENT MESSAGE (OUTPATIENT)
Dept: PRIMARY CARE CLINIC | Facility: CLINIC | Age: 53
End: 2023-05-15
Payer: OTHER GOVERNMENT

## 2023-05-18 ENCOUNTER — OFFICE VISIT (OUTPATIENT)
Dept: PRIMARY CARE CLINIC | Facility: CLINIC | Age: 53
End: 2023-05-18
Payer: MEDICARE

## 2023-05-18 DIAGNOSIS — E66.9 OBESITY (BMI 30.0-34.9): ICD-10-CM

## 2023-05-18 DIAGNOSIS — R73.03 PREDIABETES: Primary | ICD-10-CM

## 2023-05-18 PROCEDURE — 1160F PR REVIEW ALL MEDS BY PRESCRIBER/CLIN PHARMACIST DOCUMENTED: ICD-10-PCS | Mod: CPTII,95,, | Performed by: NURSE PRACTITIONER

## 2023-05-18 PROCEDURE — 1159F PR MEDICATION LIST DOCUMENTED IN MEDICAL RECORD: ICD-10-PCS | Mod: CPTII,95,, | Performed by: NURSE PRACTITIONER

## 2023-05-18 PROCEDURE — 4010F ACE/ARB THERAPY RXD/TAKEN: CPT | Mod: CPTII,95,, | Performed by: NURSE PRACTITIONER

## 2023-05-18 PROCEDURE — 99214 OFFICE O/P EST MOD 30 MIN: CPT | Mod: 95,,, | Performed by: NURSE PRACTITIONER

## 2023-05-18 PROCEDURE — 99214 PR OFFICE/OUTPT VISIT, EST, LEVL IV, 30-39 MIN: ICD-10-PCS | Mod: 95,,, | Performed by: NURSE PRACTITIONER

## 2023-05-18 PROCEDURE — 1160F RVW MEDS BY RX/DR IN RCRD: CPT | Mod: CPTII,95,, | Performed by: NURSE PRACTITIONER

## 2023-05-18 PROCEDURE — 4010F PR ACE/ARB THEARPY RXD/TAKEN: ICD-10-PCS | Mod: CPTII,95,, | Performed by: NURSE PRACTITIONER

## 2023-05-18 PROCEDURE — 1159F MED LIST DOCD IN RCRD: CPT | Mod: CPTII,95,, | Performed by: NURSE PRACTITIONER

## 2023-05-18 RX ORDER — SEMAGLUTIDE 2.68 MG/ML
2 INJECTION, SOLUTION SUBCUTANEOUS
Qty: 3 EACH | Refills: 3 | Status: SHIPPED | OUTPATIENT
Start: 2023-05-18 | End: 2023-09-29

## 2023-05-22 NOTE — PROGRESS NOTES
Subjective:       Patient ID: Irasema Vang is a 52 y.o. female.    Chief Complaint: Medication Management and Refills for Pre-diabetes/Obesity  The patient location is:[place], [place]    Visit type: audiovisual-Synchronous      Face to Face time with patient: 11 min  12 minutes of total time spent on the encounter, which includes face to face time and non-face to face time preparing to see the patient (eg, review of tests), Obtaining and/or reviewing separately obtained history, Documenting clinical information in the electronic or other health record, Independently interpreting results (not separately reported) and communicating results to the patient/family/caregiver, or Care coordination (not separately reported).         Each patient to whom he or she provides medical services by telemedicine is:  (1) informed of the relationship between the physician and patient and the respective role of any other health care provider with respect to management of the patient; and (2) notified that he or she may decline to receive medical services by telemedicine and may withdraw from such care at any time.     History of Present Illness:   Irasema Vang 52 y.o. female presents today with reports of prediabetes and obesity. Patient denies any other problems or concerns at this time. Treatment options and alternatives were discussed with the patient. Patient provided opportunity to ask additional questions.  All questions were answered. Voices understanding and acceptance of this advice. Instructed to call back if any further questions or concerns.      Past Medical History:   Diagnosis Date    Abnormal Pap smear     history of uterine cancer    Abnormal Pap smear of cervix     Asthma     no meds since age 20    Constipation, chronic     Depression     Family history of nephrolithiasis     General anesthetics causing adverse effect in therapeutic use     slow to wake up    History of cervical dysplasia 6/27/2013     Hypertension     Menopausal syndrome (hot flashes) 6/27/2013    Nephrolithiasis 6/27/2013    PONV (postoperative nausea and vomiting)     PTSD (post-traumatic stress disorder)     Renal cell carcinoma     Seizures     1 yr ago- caused by migraine meds--no treatment now    Urinoma     Uterine cancer 2000     Family History   Problem Relation Age of Onset    Cancer Maternal Grandmother     Diabetes Brother     Hypertension Mother     Cancer Father     Breast cancer Paternal Aunt     Ovarian cancer Paternal Aunt     Breast cancer Paternal Aunt     Breast cancer Paternal Aunt     Colon cancer Neg Hx      Social History     Socioeconomic History    Marital status:    Tobacco Use    Smoking status: Never    Smokeless tobacco: Never   Substance and Sexual Activity    Alcohol use: Not Currently     Comment: occassionally     Drug use: Yes     Types: Marijuana     Comment: Liquid Cannabis Oil    Sexual activity: Not Currently     Partners: Male     Social Determinants of Health     Financial Resource Strain: Low Risk     Difficulty of Paying Living Expenses: Not very hard   Food Insecurity: No Food Insecurity    Worried About Running Out of Food in the Last Year: Never true    Ran Out of Food in the Last Year: Never true   Transportation Needs: No Transportation Needs    Lack of Transportation (Medical): No    Lack of Transportation (Non-Medical): No   Physical Activity: Inactive    Days of Exercise per Week: 0 days    Minutes of Exercise per Session: 0 min   Stress: Stress Concern Present    Feeling of Stress : To some extent   Social Connections: Moderately Isolated    Frequency of Communication with Friends and Family: More than three times a week    Frequency of Social Gatherings with Friends and Family: More than three times a week    Attends Voodoo Services: More than 4 times per year    Active Member of Clubs or Organizations: No    Attends Club or Organization Meetings: Never    Marital Status:     Housing Stability: Low Risk     Unable to Pay for Housing in the Last Year: No    Number of Places Lived in the Last Year: 2    Unstable Housing in the Last Year: No     Outpatient Encounter Medications as of 5/18/2023   Medication Sig Dispense Refill    buPROPion (WELLBUTRIN XL) 300 MG 24 hr tablet Take 1 tablet (300 mg total) by mouth once daily. 30 tablet 2    EPINEPHrine (EPIPEN) 0.3 mg/0.3 mL AtIn Inject into the muscle as needed. 2 each 6    estradiol (DIVIGEL) 1 mg/gram (0.1 %) topical gel Place 1 g onto the skin once daily. 30 g 12    fluvoxaMINE (LUVOX) 100 MG tablet Take 1/2 tablet at bedtime for 7 days then take 1 tablet thereafter. (Patient not taking: Reported on 2/1/2023) 30 tablet 2    hydroCHLOROthiazide (HYDRODIURIL) 25 MG tablet Take 1 tablet (25 mg total) by mouth once daily. 90 tablet 0    ibuprofen (ADVIL,MOTRIN) 800 MG tablet Take 1 tablet (800 mg total) by mouth every 6 (six) hours as needed for Pain. 30 tablet 1    losartan (COZAAR) 50 MG tablet Take 1 tablet (50 mg total) by mouth once daily. 90 tablet 0    omeprazole (PRILOSEC) 40 MG capsule Take 1 capsule (40 mg total) by mouth once daily. 30 capsule 11    semaglutide (OZEMPIC) 1 mg/dose (4 mg/3 mL) Inject 1 mg into the skin every 7 days. 1 pen 2    semaglutide (OZEMPIC) 2 mg/dose (8 mg/3 mL) PnIj Inject 2 mg into the skin every 7 days. 3 each 3    triamcinolone acetonide 0.1% (KENALOG) 0.1 % cream Apply topically 2 (two) times daily. for 14 days 80 g 2    zolpidem (AMBIEN) 5 MG Tab Take 1/2 to 1 tablet at bedtime as needed for sleep 30 tablet 2     Facility-Administered Encounter Medications as of 5/18/2023   Medication Dose Route Frequency Provider Last Rate Last Admin    lactated ringers infusion   Intravenous Continuous Darby Monroy MD   Stopped at 09/04/18 1347    nozaseptin (NOZIN) nasal    Each Nostril On Call Procedure Darby Monroy MD   Given at 09/04/18 1019    phenazopyridine tablet 200 mg  200 mg  Oral On Call Procedure Darby Monroy MD   200 mg at 09/04/18 1108       Review of Systems   Constitutional:  Negative for activity change and unexpected weight change.   HENT:  Negative for hearing loss, rhinorrhea and trouble swallowing.    Eyes:  Negative for discharge and visual disturbance.   Respiratory:  Negative for chest tightness and wheezing.    Cardiovascular:  Negative for chest pain and palpitations.   Gastrointestinal:  Positive for constipation. Negative for blood in stool, diarrhea and vomiting.   Endocrine: Negative for polydipsia and polyuria.   Genitourinary:  Negative for difficulty urinating, dysuria, hematuria and menstrual problem.   Musculoskeletal:  Negative for arthralgias, joint swelling and neck pain.   Neurological:  Negative for weakness and headaches.   Psychiatric/Behavioral:  Negative for confusion and dysphoric mood.      Objective:      LMP 01/05/2001   Physical Exam  Constitutional:       Appearance: Normal appearance. She is obese.   Neurological:      Mental Status: She is alert.       Results for orders placed or performed in visit on 03/01/23   Sedimentation rate   Result Value Ref Range    Sed Rate 9 0 - 36 mm/Hr   C-Reactive Protein   Result Value Ref Range    CRP 3.4 0.0 - 8.2 mg/L   LACTATE DEHYDROGENASE   Result Value Ref Range     110 - 260 U/L   CBC Auto Differential   Result Value Ref Range    WBC 4.13 3.90 - 12.70 K/uL    RBC 4.32 4.00 - 5.40 M/uL    Hemoglobin 12.3 12.0 - 16.0 g/dL    Hematocrit 38.7 37.0 - 48.5 %    MCV 90 82 - 98 fL    MCH 28.5 27.0 - 31.0 pg    MCHC 31.8 (L) 32.0 - 36.0 g/dL    RDW 13.4 11.5 - 14.5 %    Platelets 262 150 - 450 K/uL    MPV 10.5 9.2 - 12.9 fL    Immature Granulocytes 0.2 0.0 - 0.5 %    Gran # (ANC) 1.6 (L) 1.8 - 7.7 K/uL    Immature Grans (Abs) 0.01 0.00 - 0.04 K/uL    Lymph # 1.8 1.0 - 4.8 K/uL    Mono # 0.4 0.3 - 1.0 K/uL    Eos # 0.3 0.0 - 0.5 K/uL    Baso # 0.02 0.00 - 0.20 K/uL    nRBC 0 0 /100 WBC    Gran %  39.4 38.0 - 73.0 %    Lymph % 42.4 18.0 - 48.0 %    Mono % 10.7 4.0 - 15.0 %    Eosinophil % 6.8 0.0 - 8.0 %    Basophil % 0.5 0.0 - 1.9 %    Differential Method Automated    JANICE Screen w/Reflex   Result Value Ref Range    JANICE Screen Positive (A) Negative <1:80   JANICE Pattern 1   Result Value Ref Range    JANICE PATTERN 1 Speckled    JANICE Titer 1   Result Value Ref Range    JANICE Titer 1 1:640      Assessment:       1. Prediabetes    2. Obesity (BMI 30.0-34.9)        Plan:   Prediabetes    Obesity (BMI 30.0-34.9)    Other orders  -     semaglutide (OZEMPIC) 2 mg/dose (8 mg/3 mL) PnIj; Inject 2 mg into the skin every 7 days.  Dispense: 3 each; Refill: 3             [facility]   [address] [place] [address] [address]  [place] [address]  Office [phone]  Fax [phone]

## 2023-06-08 ENCOUNTER — HOSPITAL ENCOUNTER (OUTPATIENT)
Dept: RADIOLOGY | Facility: HOSPITAL | Age: 53
Discharge: HOME | End: 2023-06-08
Attending: NURSE PRACTITIONER
Payer: OTHER GOVERNMENT

## 2023-06-08 DIAGNOSIS — Z01.89 ENCOUNTER FOR OTHER SPECIFIED SPECIAL EXAMINATIONS: ICD-10-CM

## 2023-06-08 PROCEDURE — 76856 US PELVIS COMP WITH TRANSVAG NON-OB (XPD): ICD-10-PCS | Mod: 26,,, | Performed by: RADIOLOGY

## 2023-06-08 PROCEDURE — 76830 TRANSVAGINAL US NON-OB: CPT | Mod: 26,,, | Performed by: RADIOLOGY

## 2023-06-08 PROCEDURE — 76856 US EXAM PELVIC COMPLETE: CPT | Mod: TC

## 2023-06-08 PROCEDURE — 76856 US EXAM PELVIC COMPLETE: CPT | Mod: 26,,, | Performed by: RADIOLOGY

## 2023-06-08 PROCEDURE — 76830 US PELVIS COMP WITH TRANSVAG NON-OB (XPD): ICD-10-PCS | Mod: 26,,, | Performed by: RADIOLOGY

## 2023-06-16 DIAGNOSIS — K21.9 GASTROESOPHAGEAL REFLUX DISEASE, ESOPHAGITIS PRESENCE NOT SPECIFIED: ICD-10-CM

## 2023-06-19 RX ORDER — OMEPRAZOLE 40 MG/1
40 CAPSULE, DELAYED RELEASE ORAL DAILY
Qty: 30 CAPSULE | Refills: 11 | OUTPATIENT
Start: 2023-06-19 | End: 2024-06-18

## 2023-06-19 NOTE — TELEPHONE ENCOUNTER
Spoke with patient on [phone] in reference to refills. Patient has been advised she will need an appointment for additional refills. Patient voices understanding and states she will go on the patient portal to schedule an appointment.

## 2023-06-26 DIAGNOSIS — I10 ESSENTIAL HYPERTENSION: ICD-10-CM

## 2023-06-26 NOTE — TELEPHONE ENCOUNTER
Refill Routing Note   Refill Routing Note   Medication(s) are not appropriate for processing by [facility] for the following reason(s):      Required labs outdated    [facility] action(s):  Defer None identified     Medication Therapy Plan: LOV 2/28/23 with Kaya Pal MD  Medication reconciliation completed: No     Appointments  past 12m or future 3m with PCP    Date Provider   Last Visit   2/28/2023 Kaya Pal MD   Next Visit   Visit date not found Kaya Pal MD   ED visits in past 90 days: 0        Note composed:6:21 PM 06/26/2023

## 2023-06-29 RX ORDER — LOSARTAN POTASSIUM 50 MG/1
50 TABLET ORAL DAILY
Qty: 90 TABLET | Refills: 3 | Status: SHIPPED | OUTPATIENT
Start: 2023-06-29 | End: 2024-06-28

## 2023-06-29 RX ORDER — HYDROCHLOROTHIAZIDE 25 MG/1
25 TABLET ORAL DAILY
Qty: 90 TABLET | Refills: 3 | Status: SHIPPED | OUTPATIENT
Start: 2023-06-29 | End: 2023-08-08 | Stop reason: SINTOL

## 2023-07-03 RX ORDER — BUPROPION HYDROCHLORIDE 300 MG/1
300 TABLET ORAL DAILY
Qty: 30 TABLET | Refills: 2 | Status: SHIPPED | OUTPATIENT
Start: 2023-07-03 | End: 2023-10-31 | Stop reason: ALTCHOICE

## 2023-07-03 RX ORDER — FLUVOXAMINE MALEATE 100 MG/1
TABLET, COATED ORAL
Qty: 30 TABLET | Refills: 2 | Status: SHIPPED | OUTPATIENT
Start: 2023-07-03 | End: 2023-08-14 | Stop reason: SDUPTHER

## 2023-07-06 ENCOUNTER — TELEPHONE (OUTPATIENT)
Dept: PSYCHIATRY | Facility: CLINIC | Age: 53
End: 2023-07-06
Payer: OTHER GOVERNMENT

## 2023-07-06 ENCOUNTER — PATIENT MESSAGE (OUTPATIENT)
Dept: PSYCHIATRY | Facility: CLINIC | Age: 53
End: 2023-07-06
Payer: OTHER GOVERNMENT

## 2023-07-07 RX ORDER — VILAZODONE HYDROCHLORIDE 20 MG/1
20 TABLET ORAL DAILY
Qty: 30 TABLET | Refills: 2 | Status: SHIPPED | OUTPATIENT
Start: 2023-07-07 | End: 2023-10-18 | Stop reason: SDUPTHER

## 2023-07-10 ENCOUNTER — OFFICE VISIT (OUTPATIENT)
Dept: HEMATOLOGY/ONCOLOGY | Facility: CLINIC | Age: 53
End: 2023-07-10
Payer: OTHER GOVERNMENT

## 2023-07-10 VITALS
DIASTOLIC BLOOD PRESSURE: 72 MMHG | OXYGEN SATURATION: 99 % | WEIGHT: 143.94 LBS | HEIGHT: 61 IN | SYSTOLIC BLOOD PRESSURE: 107 MMHG | HEART RATE: 89 BPM | TEMPERATURE: 97 F | BODY MASS INDEX: 27.18 KG/M2

## 2023-07-10 DIAGNOSIS — I10 HYPERTENSION GOAL BP (BLOOD PRESSURE) < 140/90: ICD-10-CM

## 2023-07-10 DIAGNOSIS — Z84.1 FAMILY HISTORY OF NEPHROLITHIASIS: ICD-10-CM

## 2023-07-10 DIAGNOSIS — Z87.410 HISTORY OF CERVICAL DYSPLASIA: ICD-10-CM

## 2023-07-10 DIAGNOSIS — C64.2 RENAL CELL CARCINOMA OF LEFT KIDNEY: Primary | ICD-10-CM

## 2023-07-10 DIAGNOSIS — R59.0 LYMPHADENOPATHY, AXILLARY: ICD-10-CM

## 2023-07-10 PROCEDURE — 99203 OFFICE O/P NEW LOW 30 MIN: CPT | Mod: S$PBB,,, | Performed by: INTERNAL MEDICINE

## 2023-07-10 PROCEDURE — 99999 PR PBB SHADOW E&M-EST. PATIENT-LVL V: CPT | Mod: PBBFAC,,, | Performed by: INTERNAL MEDICINE

## 2023-07-10 PROCEDURE — 99203 PR OFFICE/OUTPT VISIT, NEW, LEVL III, 30-44 MIN: ICD-10-PCS | Mod: S$PBB,,, | Performed by: INTERNAL MEDICINE

## 2023-07-10 PROCEDURE — 99999 PR PBB SHADOW E&M-EST. PATIENT-LVL V: ICD-10-PCS | Mod: PBBFAC,,, | Performed by: INTERNAL MEDICINE

## 2023-07-10 PROCEDURE — 99215 OFFICE O/P EST HI 40 MIN: CPT | Mod: PBBFAC | Performed by: INTERNAL MEDICINE

## 2023-07-10 NOTE — PROGRESS NOTES
Subjective:       Patient ID: Irasema Vang is a 52 y.o. female.    Chief Complaint: Results    HPI:  52-year-old female previous history of right renal clear cell carcinoma.  Patient has a significant family history of malignancy.  [facility]    Past Medical History:   Diagnosis Date    Abnormal Pap smear     history of uterine cancer    Abnormal Pap smear of cervix     Asthma     no meds since age 20    Constipation, chronic     Depression     Family history of nephrolithiasis     General anesthetics causing adverse effect in therapeutic use     slow to wake up    History of cervical dysplasia 6/27/2013    Hypertension     Menopausal syndrome (hot flashes) 6/27/2013    Nephrolithiasis 6/27/2013    PONV (postoperative nausea and vomiting)     PTSD (post-traumatic stress disorder)     Renal cell carcinoma     Seizures     1 yr ago- caused by migraine meds--no treatment now    Urinoma     Uterine cancer 2000     Family History   Problem Relation Age of Onset    Cancer Maternal Grandmother     Diabetes Brother     Hypertension Mother     Cancer Father     Breast cancer Paternal Aunt     Ovarian cancer Paternal Aunt     Breast cancer Paternal Aunt     Breast cancer Paternal Aunt     Colon cancer Neg Hx      Social History     Socioeconomic History    Marital status:    Tobacco Use    Smoking status: Never    Smokeless tobacco: Never   Substance and Sexual Activity    Alcohol use: Not Currently     Comment: occassionally     Drug use: Yes     Types: Marijuana     Comment: Liquid Cannabis Oil    Sexual activity: Not Currently     Partners: Male     Social Determinants of Health     Financial Resource Strain: Low Risk     Difficulty of Paying Living Expenses: Not very hard   Food Insecurity: No Food Insecurity    Worried About Running Out of Food in the Last Year: Never true    Ran Out of Food in the Last Year: Never true   Transportation Needs: No Transportation Needs    Lack of  Transportation (Medical): No    Lack of Transportation (Non-Medical): No   Physical Activity: Inactive    Days of Exercise per Week: 0 days    Minutes of Exercise per Session: 0 min   Stress: Stress Concern Present    Feeling of Stress : To some extent   Social Connections: Moderately Isolated    Frequency of Communication with Friends and Family: More than three times a week    Frequency of Social Gatherings with Friends and Family: More than three times a week    Attends Yazidi Services: More than 4 times per year    Active Member of Clubs or Organizations: No    Attends Club or Organization Meetings: Never    Marital Status:    Housing Stability: Low Risk     Unable to Pay for Housing in the Last Year: No    Number of Places Lived in the Last Year: 2    Unstable Housing in the Last Year: No     Past Surgical History:   Procedure Laterality Date    COLONOSCOPY  05/17/13    non-bleeding AVMs    COLONOSCOPY N/A 1/4/2023    Procedure: COLONOSCOPY;  Surgeon: Dalila Hernandez MD;  Location: [facility];  Service: Endoscopy;  Laterality: N/A;    COLONOSCOPY W/ POLYPECTOMY      cystoscopy with stent placement      ESOPHAGOGASTRODUODENOSCOPY  05/17/13    HYSTERECTOMY      LAPAROSCOPIC LYSIS OF ADHESIONS N/A 9/4/2018    Procedure: LYSIS, ADHESIONS, LAPAROSCOPIC;  Surgeon: Darby Monroy MD;  Location: [facility];  Service: OB/GYN;  Laterality: N/A;    LAPAROSCOPIC OOPHORECTOMY Left 9/4/2018    Procedure: OOPHORECTOMY, LAPAROSCOPIC;  Surgeon: Darby Monroy MD;  Location: [facility];  Service: OB/GYN;  Laterality: Left;    LAPAROSCOPIC SURGICAL REMOVAL OF CYST OF OVARY Right 9/4/2018    Procedure: EXCISION, CYST, OVARY, LAPAROSCOPIC;  Surgeon: Darby Monroy MD;  Location: [facility];  Service: OB/GYN;  Laterality: Right;    Laparoscopy  09/05/2017    OOPHORECTOMY      1 ovary removed    URETEROSCOPY         Labs:  Lab Results   Component Value Date    WBC 4.13 03/01/2023    HGB 12.3 03/01/2023     HCT 38.7 03/01/2023    MCV 90 03/01/2023     03/01/2023     BMP  Lab Results   Component Value Date     06/25/2022    K 3.4 (L) 06/25/2022     06/25/2022    CO2 25 06/25/2022    BUN 14 06/25/2022    CREATININE 0.8 06/25/2022    CALCIUM 9.5 06/25/2022    ANIONGAP 11 06/25/2022    ESTGFRAFRICA >60 06/25/2022    EGFRNONAA >60 06/25/2022     Lab Results   Component Value Date    ALT 30 06/25/2022    AST 28 06/25/2022    ALKPHOS 70 06/25/2022    BILITOT 0.5 06/25/2022       No results found for: IRON, TIBC, FERRITIN, SATURATEDIRO  Lab Results   Component Value Date    BFNASDPI36 1669 (H) 08/11/2018     No results found for: FOLATE  Lab Results   Component Value Date    TSH 1.525 11/01/2022         Review of Systems   Constitutional:  Negative for activity change, appetite change, chills, diaphoresis, fatigue, fever and unexpected weight change.   HENT:  Negative for congestion, dental problem, drooling, ear discharge, ear pain, facial swelling, hearing loss, mouth sores, nosebleeds, postnasal drip, rhinorrhea, sinus pressure, sneezing, sore throat, tinnitus, trouble swallowing and voice change.    Eyes:  Negative for photophobia, pain, discharge, redness, itching and visual disturbance.   Respiratory:  Negative for cough, choking, chest tightness, shortness of breath, wheezing and stridor.    Cardiovascular:  Positive for chest pain. Negative for palpitations and leg swelling.        The patient reportedly has lymphadenopathy in his scheduled for biopsy in [facility]   Gastrointestinal:  Negative for abdominal distention, abdominal pain, anal bleeding, blood in stool, constipation, diarrhea, nausea, rectal pain and vomiting.   Endocrine: Negative for cold intolerance, heat intolerance, polydipsia, polyphagia and polyuria.   Genitourinary:  Negative for decreased urine volume, difficulty urinating, dyspareunia, dysuria, enuresis, flank pain, frequency, genital sores, hematuria, menstrual  problem, pelvic pain, urgency, vaginal bleeding, vaginal discharge and vaginal pain.   Musculoskeletal:  Negative for arthralgias, back pain, gait problem, joint swelling, myalgias, neck pain and neck stiffness.   Skin:  Negative for color change, pallor and rash.   Allergic/Immunologic: Negative for environmental allergies, food allergies and immunocompromised state.   Neurological:  Negative for dizziness, tremors, seizures, syncope, facial asymmetry, speech difficulty, weakness, light-headedness, numbness and headaches.   Hematological:  Negative for adenopathy. Does not bruise/bleed easily.   Psychiatric/Behavioral:  Negative for agitation, behavioral problems, confusion, decreased concentration, dysphoric mood, hallucinations, self-injury, sleep disturbance and suicidal ideas. The patient is not nervous/anxious and is not hyperactive.      Objective:      Physical Exam  Vitals reviewed.   Constitutional:       General: She is not in acute distress.     Appearance: She is well-developed. She is obese. She is not diaphoretic.   HENT:      Head: Normocephalic and atraumatic.      Right Ear: External ear normal.      Left Ear: External ear normal.      Nose: Nose normal.      Right Sinus: No maxillary sinus tenderness or frontal sinus tenderness.      Left Sinus: No maxillary sinus tenderness or frontal sinus tenderness.      Mouth/Throat:      Pharynx: No oropharyngeal exudate.   Eyes:      General: Lids are normal. No scleral icterus.        Right eye: No discharge.         Left eye: No discharge.      Conjunctiva/sclera: Conjunctivae normal.      Right eye: Right conjunctiva is not injected. No hemorrhage.     Left eye: Left conjunctiva is not injected. No hemorrhage.     Pupils: Pupils are equal, round, and reactive to light.   Neck:      Thyroid: No thyromegaly.      Vascular: No JVD.      Trachea: No tracheal deviation.   Cardiovascular:      Rate and Rhythm: Normal rate.   Pulmonary:      Effort: Pulmonary  effort is normal. No respiratory distress.      Breath sounds: No stridor.   Chest:      Chest wall: No tenderness.   Abdominal:      General: Bowel sounds are normal. There is no distension.      Palpations: Abdomen is soft. There is no hepatomegaly, splenomegaly or mass.      Tenderness: There is no abdominal tenderness. There is no rebound.   Musculoskeletal:         General: No tenderness. Normal range of motion.      Cervical back: Normal range of motion and neck supple.   Lymphadenopathy:      Cervical: No cervical adenopathy.      Upper Body:      Right upper body: No supraclavicular adenopathy.      Left upper body: No supraclavicular adenopathy.   Skin:     General: Skin is dry.      Findings: No erythema or rash.   Neurological:      Mental Status: She is alert and oriented to person, place, and time.      Cranial Nerves: No cranial nerve deficit.      Coordination: Coordination normal.   Psychiatric:         Behavior: Behavior normal.         Thought Content: Thought content normal.         Judgment: Judgment normal.           Assessment:      1. Renal cell carcinoma of left kidney    2. Lymphadenopathy, axillary    3. Family history of nephrolithiasis    4. History of cervical dysplasia    5. Hypertension goal BP (blood pressure) < 140/90           Med Onc Chart Routing      Follow up with physician 4 weeks. Review in 4 weeks for follow-up of germ line testing   Follow up with SHANNAN    Infusion scheduling note    Injection scheduling note    Labs    Imaging    Pharmacy appointment    Other referrals             Plan:     Review of imaging demonstrates no clear evidence of malignancy however patient has reportedly to have lymph node biopsies done in [facility] system I have asked her to bring those to me because of her extensive family history of malignancy with breast cancer in her with renal carcinoma will proceed with germ line testing and review        Edilson Clifford Jr, MD FACP

## 2023-07-11 ENCOUNTER — TELEPHONE (OUTPATIENT)
Dept: HEMATOLOGY/ONCOLOGY | Facility: CLINIC | Age: 53
End: 2023-07-11
Payer: OTHER GOVERNMENT

## 2023-07-11 NOTE — TELEPHONE ENCOUNTER
Amy drawn on 07/10/23. Patient tolerated well, no distress noted.    Tracking #: [id number]   Confirmation: [id number]

## 2023-07-20 ENCOUNTER — LAB VISIT (OUTPATIENT)
Dept: LAB | Facility: HOSPITAL | Age: 53
End: 2023-07-20
Attending: INTERNAL MEDICINE
Payer: OTHER GOVERNMENT

## 2023-07-20 DIAGNOSIS — R59.0 LAD (LYMPHADENOPATHY), AXILLARY: ICD-10-CM

## 2023-07-20 LAB
ANION GAP SERPL CALC-SCNC: 9 MMOL/L (ref 8–16)
BUN SERPL-MCNC: 13 MG/DL (ref 6–20)
CALCIUM SERPL-MCNC: 9.5 MG/DL (ref 8.7–10.5)
CHLORIDE SERPL-SCNC: 100 MMOL/L (ref 95–110)
CO2 SERPL-SCNC: 31 MMOL/L (ref 23–29)
CREAT SERPL-MCNC: 0.9 MG/DL (ref 0.5–1.4)
EST. GFR  (NO RACE VARIABLE): >60 ML/MIN/1.73 M^2
GLUCOSE SERPL-MCNC: 93 MG/DL (ref 70–110)
POTASSIUM SERPL-SCNC: 3 MMOL/L (ref 3.5–5.1)
SODIUM SERPL-SCNC: 140 MMOL/L (ref 136–145)

## 2023-07-20 PROCEDURE — 80048 BASIC METABOLIC PNL TOTAL CA: CPT | Performed by: FAMILY MEDICINE

## 2023-07-20 PROCEDURE — 36415 COLL VENOUS BLD VENIPUNCTURE: CPT | Performed by: FAMILY MEDICINE

## 2023-07-23 DIAGNOSIS — E83.42 HYPOMAGNESEMIA: ICD-10-CM

## 2023-07-23 DIAGNOSIS — T50.2X5A DIURETIC-INDUCED HYPOKALEMIA: Primary | ICD-10-CM

## 2023-07-23 DIAGNOSIS — E87.6 DIURETIC-INDUCED HYPOKALEMIA: Primary | ICD-10-CM

## 2023-07-23 RX ORDER — POTASSIUM CHLORIDE 20 MEQ/1
20 TABLET, EXTENDED RELEASE ORAL 3 TIMES DAILY
Qty: 90 TABLET | Refills: 2 | Status: SHIPPED | OUTPATIENT
Start: 2023-07-23 | End: 2023-08-24

## 2023-07-23 RX ORDER — LANOLIN ALCOHOL/MO/W.PET/CERES
400 CREAM (GRAM) TOPICAL DAILY
Qty: 90 TABLET | Refills: 0 | Status: SHIPPED | OUTPATIENT
Start: 2023-07-23 | End: 2023-09-14 | Stop reason: ALTCHOICE

## 2023-07-24 NOTE — PROGRESS NOTES
Abnormal lab result, Rx have been sent to the pharmacy-for potassium and magnesium.  Your lab shows that your potassium level is low.  Prolonged low level of potassium can cause muscle weakness, irregular heartbeat, reduced kidney function and glucose intolerance.  Intentionally eat foods that are high in potassium such as beans, banana, sweet potatoes and avocado.  You will need to take Potassium 20meq 3 x a day until your level is corrected.   Then you would take potassium 20meq once a day.  Repeat BMP  and magnesium in one week. Thank you.

## 2023-07-25 ENCOUNTER — HOSPITAL ENCOUNTER (OUTPATIENT)
Dept: RADIOLOGY | Facility: HOSPITAL | Age: 53
Discharge: HOME | End: 2023-07-25
Attending: FAMILY MEDICINE
Payer: OTHER GOVERNMENT

## 2023-07-25 ENCOUNTER — TELEPHONE (OUTPATIENT)
Dept: PRIMARY CARE CLINIC | Facility: CLINIC | Age: 53
End: 2023-07-25
Payer: OTHER GOVERNMENT

## 2023-07-25 DIAGNOSIS — R59.0 AXILLARY LYMPHADENOPATHY: ICD-10-CM

## 2023-07-25 PROCEDURE — 77066 DX MAMMO INCL CAD BI: CPT | Mod: 26,,, | Performed by: STUDENT IN AN ORGANIZED HEALTH CARE EDUCATION/TRAINING PROGRAM

## 2023-07-25 PROCEDURE — 76642 US BREAST LEFT LIMITED: ICD-10-PCS | Mod: 26,LT,, | Performed by: STUDENT IN AN ORGANIZED HEALTH CARE EDUCATION/TRAINING PROGRAM

## 2023-07-25 PROCEDURE — 77062 MAMMO DIGITAL DIAGNOSTIC BILAT WITH TOMO: ICD-10-PCS | Mod: 26,,, | Performed by: STUDENT IN AN ORGANIZED HEALTH CARE EDUCATION/TRAINING PROGRAM

## 2023-07-25 PROCEDURE — 77066 MAMMO DIGITAL DIAGNOSTIC BILAT WITH TOMO: ICD-10-PCS | Mod: 26,,, | Performed by: STUDENT IN AN ORGANIZED HEALTH CARE EDUCATION/TRAINING PROGRAM

## 2023-07-25 PROCEDURE — 77062 BREAST TOMOSYNTHESIS BI: CPT | Mod: 26,,, | Performed by: STUDENT IN AN ORGANIZED HEALTH CARE EDUCATION/TRAINING PROGRAM

## 2023-07-25 PROCEDURE — 76642 ULTRASOUND BREAST LIMITED: CPT | Mod: TC,LT

## 2023-07-25 PROCEDURE — 77066 DX MAMMO INCL CAD BI: CPT | Mod: TC

## 2023-07-25 PROCEDURE — 76642 ULTRASOUND BREAST LIMITED: CPT | Mod: 26,LT,, | Performed by: STUDENT IN AN ORGANIZED HEALTH CARE EDUCATION/TRAINING PROGRAM

## 2023-07-25 NOTE — TELEPHONE ENCOUNTER
Pt informed of results. Pt voiced understanding.  ----- Message from Kaya Pal MD sent at 7/23/2023  7:48 PM CDT -----  Abnormal lab result, Rx have been sent to the pharmacy-for potassium and magnesium.  Your lab shows that your potassium level is low.  Prolonged low level of potassium can cause muscle weakness, irregular heartbeat, reduced kidney function and glucose intolerance.  Intentionally eat foods that are high in potassium such as beans, banana, sweet potatoes and avocado.  You will need to take Potassium 20meq 3 x a day until your level is corrected.   Then you would take potassium 20meq once a day.  Repeat BMP  and magnesium in one week. Thank you.

## 2023-08-01 ENCOUNTER — OFFICE VISIT (OUTPATIENT)
Dept: URGENT CARE | Facility: CLINIC | Age: 53
End: 2023-08-01
Payer: OTHER GOVERNMENT

## 2023-08-01 VITALS
BODY MASS INDEX: 27 KG/M2 | SYSTOLIC BLOOD PRESSURE: 93 MMHG | DIASTOLIC BLOOD PRESSURE: 57 MMHG | RESPIRATION RATE: 17 BRPM | HEIGHT: 61 IN | TEMPERATURE: 98 F | HEART RATE: 83 BPM | OXYGEN SATURATION: 99 % | WEIGHT: 143 LBS

## 2023-08-01 DIAGNOSIS — L50.9 URTICARIA: ICD-10-CM

## 2023-08-01 DIAGNOSIS — L08.9 BACTERIAL SKIN INFECTION: Primary | ICD-10-CM

## 2023-08-01 DIAGNOSIS — B96.89 BACTERIAL SKIN INFECTION: Primary | ICD-10-CM

## 2023-08-01 DIAGNOSIS — R21 ERYTHEMATOUS RASH: ICD-10-CM

## 2023-08-01 PROCEDURE — 99214 PR OFFICE/OUTPT VISIT, EST, LEVL IV, 30-39 MIN: ICD-10-PCS | Mod: S$GLB,,, | Performed by: PHYSICIAN ASSISTANT

## 2023-08-01 PROCEDURE — 99214 OFFICE O/P EST MOD 30 MIN: CPT | Mod: S$GLB,,, | Performed by: PHYSICIAN ASSISTANT

## 2023-08-01 RX ORDER — HYDROXYZINE HYDROCHLORIDE 25 MG/1
25 TABLET, FILM COATED ORAL 3 TIMES DAILY PRN
Qty: 15 TABLET | Refills: 0 | Status: SHIPPED | OUTPATIENT
Start: 2023-08-01 | End: 2023-08-06

## 2023-08-01 RX ORDER — CLINDAMYCIN HYDROCHLORIDE 300 MG/1
300 CAPSULE ORAL EVERY 8 HOURS
Qty: 15 CAPSULE | Refills: 0 | Status: SHIPPED | OUTPATIENT
Start: 2023-08-01 | End: 2023-08-06

## 2023-08-01 NOTE — PROGRESS NOTES
"Subjective:      Patient ID: Irasema Vang is a 52 y.o. female.    Vitals:  height is 5' 1" (1.549 m) and weight is 64.9 kg (143 lb). Her oral temperature is 98.2 °F (36.8 °C). Her blood pressure is 93/57 (abnormal) and her pulse is 83. Her respiration is 17 and oxygen saturation is 99%.     Chief Complaint: Rash (Left arm, chest, and left breast. )    53 y/o female with significant PMH of renal cell carcinoma s/p left axillary lymph node biopsy 7/13, presents to the clinic today with a rash that is itching and painful on her left arm and into left breast area that began yesterday. Pt reports she has been having some soreness in the left arm and chest region since the procedure. The red rash began yesterday along with the PND, subjective fever, and feeling "like my throat is just closing up." Pt reports taking benadryl for the rash and using some hydrocortisone with mild relief. Reports the rash itches and wakes her up at night. Denies any SOB, chest pain, numbness or tingling in the arm, n/v, chills, arm swelling. Denies any exposure to new cleaning/hygiene products, foods, or medications.   [facility] [place] Reports she has reached out to her surgeon who is out of the office for the week.   Mammogram and US of left breast on 7/25 with no evidence of malignancy; MRI was ordered to further investigate.     Rash  This is a new problem. The current episode started yesterday. The problem has been rapidly worsening since onset. The affected locations include the left axilla, chest and left arm. The rash is characterized by redness, pain and itchiness. She was exposed to nothing. Pertinent negatives include no anorexia, congestion, cough, diarrhea, eye pain, facial edema, fatigue, fever, joint pain, nail changes, rhinorrhea, shortness of breath, sore throat or vomiting. Past treatments include antihistamine (Benadryl & Hydrocortisone cream). The treatment provided mild relief. Her " past medical history is significant for allergies, asthma and eczema. There is no history of varicella.       Constitution: Negative for chills, sweating, fatigue and fever.   HENT:  Positive for postnasal drip. Negative for ear pain, ear discharge, drooling, congestion, sinus pressure, sore throat and trouble swallowing.    Neck: neck negative.   Cardiovascular: Negative.    Eyes:  Negative for eye pain.   Respiratory:  Negative for chest tightness, cough, shortness of breath, stridor and wheezing.    Gastrointestinal:  Negative for nausea, vomiting and diarrhea.   Skin:  Positive for rash, erythema and hives. Negative for wound and skin thickening/induration.   Allergic/Immunologic: Positive for hives and itching. Negative for sneezing.   Neurological:  Negative for dizziness, loss of consciousness, numbness and tingling.      Objective:     Physical Exam   Constitutional: She is oriented to person, place, and time. She appears well-developed. She is cooperative.  Non-toxic appearance. She does not appear ill. No distress.   HENT:   Head: Normocephalic and atraumatic.   Ears:   Right Ear: Hearing, tympanic membrane, external ear and ear canal normal.   Left Ear: Hearing, tympanic membrane, external ear and ear canal normal.   Nose: Congestion present. No mucosal edema or rhinorrhea. Right sinus exhibits no maxillary sinus tenderness and no frontal sinus tenderness. Left sinus exhibits no maxillary sinus tenderness and no frontal sinus tenderness.   Mouth/Throat: Uvula is midline and mucous membranes are normal. No trismus in the jaw. No uvula swelling. Posterior oropharyngeal erythema present. No oropharyngeal exudate or posterior oropharyngeal edema.   Eyes: Conjunctivae and lids are normal. No scleral icterus. Extraocular movement intact   Neck: Trachea normal and phonation normal. Neck supple. No edema present. No erythema present. No neck rigidity present.   Cardiovascular: Normal rate, regular rhythm, normal  heart sounds and normal pulses.   Pulmonary/Chest: Effort normal and breath sounds normal. No respiratory distress. She has no decreased breath sounds. She has no rhonchi. She exhibits tenderness. Left breast exhibits tenderness. No breast swelling.       Abdominal: Normal appearance.   Genitourinary: No breast swelling.   Musculoskeletal: Normal range of motion.         General: No deformity. Normal range of motion.   Neurological: She is alert and oriented to person, place, and time. She exhibits normal muscle tone. Coordination normal.   Skin: Skin is warm, dry, intact, not diaphoretic, not pale, rash, urticarial, not vesicular and no abscessed. Capillary refill takes less than 2 seconds. erythema No lesion         Comments: Erythematous patches consistent with urticarial rash on left arm, left breast, and left side of chest. No edema, decreased ROM, decreased sensation.     Psychiatric: Her speech is normal and behavior is normal. Judgment and thought content normal.   Nursing note and vitals reviewed.        Assessment:     1. Bacterial skin infection    2. Urticaria    3. Erythematous rash        Plan:       Bacterial skin infection  -     clindamycin (CLEOCIN) 300 MG capsule; Take 1 capsule (300 mg total) by mouth every 8 (eight) hours. for 5 days  Dispense: 15 capsule; Refill: 0    Urticaria  -     hydrOXYzine HCL (ATARAX) 25 MG tablet; Take 1 tablet (25 mg total) by mouth 3 (three) times daily as needed for Itching.  Dispense: 15 tablet; Refill: 0    Erythematous rash  -     hydrOXYzine HCL (ATARAX) 25 MG tablet; Take 1 tablet (25 mg total) by mouth 3 (three) times daily as needed for Itching.  Dispense: 15 tablet; Refill: 0        Medical Decision Making:   Initial Assessment:   Erythematous rash consistent with Urticaria. Biopsy incision appears to be healing well with no drainage, erythema, or edema at incision site. However, pt does have induration and c/o increased pain that is now radiating down arm  and into left breast.   Differential Diagnosis:   Cellulitis/post-op wound infection, DVT, Urticaria, Contact dermatitis  Urgent Care Management:  NAD. Rash present for 3 days, no evidence of anaphylaxis. Pt prescribed antibiotic clindamycin for possible cellulitis given increased pain which is now radiating from incision site. Advised to take full course of abx. Erythema of skin not consistent with cellulitis, especially given itchiness, and may be secondary allergic reaction. Also instructed to use hydroxyzine prn for itching. Informed this may cause drowsiness. Hydrocortisone on arm 2 times a day for itching and drying out rash (do not apply to incision). Take ibuprofen/tylenol for pain or fever per label instructions.    RTC or f/u with PCP vs surgeon if symptoms do not improve. Advised patient to f/u with her surgeon to make sure they are aware of symptoms and the plan. Instructed to go to the Emergency Room or call 911 for any worsening symptoms, chest pain, shortness of breath, fever, or increased pain.    Patient verbalized understanding and is in agreement with plan.

## 2023-08-01 NOTE — PROGRESS NOTES
"Subjective:      Patient ID: Irasema Vang is a 52 y.o. female.    Vitals:  height is 5' 1" (1.549 m) and weight is 64.9 kg (143 lb).     Chief Complaint: Rash (Left arm, chest, and left breast. )    Pt presents to the clinic today with a rash on her left arm. Left upper chest, and left breast that began on yesterday. Pt states that she head a lymph node removed from the left axillary on 07/13/23 and is waiting for results.    Rash  This is a new problem. The current episode started yesterday. The problem has been rapidly worsening since onset. The affected locations include the left axilla, chest and left arm. The rash is characterized by redness, swelling, pain and itchiness. She was exposed to nothing. Pertinent negatives include no anorexia, congestion, cough, diarrhea, eye pain, facial edema, fatigue, fever, joint pain, nail changes, rhinorrhea, shortness of breath, sore throat or vomiting. Past treatments include antihistamine (Hydrocortisone cream). The treatment provided mild relief. Her past medical history is significant for allergies, asthma and eczema. There is no history of varicella.     Constitution: Negative for fatigue and fever.   HENT:  Negative for congestion and sore throat.    Eyes:  Negative for eye pain.   Respiratory:  Negative for cough and shortness of breath.    Gastrointestinal:  Negative for vomiting and diarrhea.   Skin:  Positive for rash.    Objective:     Physical Exam    Assessment:     No diagnosis found.    Plan:       There are no diagnoses linked to this encounter.                  "

## 2023-08-01 NOTE — PATIENT INSTRUCTIONS
Please follow up with your Primary care provider within 2-5 days if your signs and symptoms have not resolved.    If your condition worsens or you develop new symptoms, we recommend that you receive another evaluation at the Emergency Room immediately or contact your primary medical clinic to discuss your concerns.   You must understand that you have received an Urgent Care treatment only and that you may be released before all of your medical problems are known or treated. You, the patient, will arrange for follow up care as instructed.     RED FLAGS/WARNING SYMPTOMS DISCUSSED WITH PATIENT THAT WOULD WARRANT EMERGENT MEDICAL ATTENTION. PATIENT VERBALIZED UNDERSTANDING.     
show

## 2023-08-04 ENCOUNTER — TELEPHONE (OUTPATIENT)
Dept: URGENT CARE | Facility: CLINIC | Age: 53
End: 2023-08-04
Payer: OTHER GOVERNMENT

## 2023-08-07 ENCOUNTER — LAB VISIT (OUTPATIENT)
Dept: LAB | Facility: HOSPITAL | Age: 53
End: 2023-08-07
Attending: FAMILY MEDICINE
Payer: OTHER GOVERNMENT

## 2023-08-07 DIAGNOSIS — E83.42 HYPOMAGNESEMIA: ICD-10-CM

## 2023-08-07 DIAGNOSIS — E87.6 DIURETIC-INDUCED HYPOKALEMIA: ICD-10-CM

## 2023-08-07 DIAGNOSIS — T50.2X5A DIURETIC-INDUCED HYPOKALEMIA: ICD-10-CM

## 2023-08-07 LAB
ANION GAP SERPL CALC-SCNC: 9 MMOL/L (ref 8–16)
BUN SERPL-MCNC: 10 MG/DL (ref 6–20)
CALCIUM SERPL-MCNC: 9 MG/DL (ref 8.7–10.5)
CHLORIDE SERPL-SCNC: 105 MMOL/L (ref 95–110)
CO2 SERPL-SCNC: 26 MMOL/L (ref 23–29)
CREAT SERPL-MCNC: 0.7 MG/DL (ref 0.5–1.4)
EST. GFR  (NO RACE VARIABLE): >60 ML/MIN/1.73 M^2
GLUCOSE SERPL-MCNC: 81 MG/DL (ref 70–110)
MAGNESIUM SERPL-MCNC: 2 MG/DL (ref 1.6–2.6)
POTASSIUM SERPL-SCNC: 3.3 MMOL/L (ref 3.5–5.1)
SODIUM SERPL-SCNC: 140 MMOL/L (ref 136–145)

## 2023-08-07 PROCEDURE — 80048 BASIC METABOLIC PNL TOTAL CA: CPT | Performed by: FAMILY MEDICINE

## 2023-08-07 PROCEDURE — 36415 COLL VENOUS BLD VENIPUNCTURE: CPT | Mod: PN | Performed by: FAMILY MEDICINE

## 2023-08-07 PROCEDURE — 83735 ASSAY OF MAGNESIUM: CPT | Performed by: FAMILY MEDICINE

## 2023-08-08 ENCOUNTER — HOSPITAL ENCOUNTER (OUTPATIENT)
Dept: RADIOLOGY | Facility: HOSPITAL | Age: 53
Discharge: HOME | End: 2023-08-08
Attending: NURSE PRACTITIONER
Payer: OTHER GOVERNMENT

## 2023-08-08 DIAGNOSIS — T50.2X5A DIURETIC-INDUCED HYPOKALEMIA: Primary | ICD-10-CM

## 2023-08-08 DIAGNOSIS — Z01.89 ENCOUNTER FOR OTHER SPECIFIED SPECIAL EXAMINATIONS: ICD-10-CM

## 2023-08-08 DIAGNOSIS — I10 ESSENTIAL HYPERTENSION: ICD-10-CM

## 2023-08-08 DIAGNOSIS — R59.9 ENLARGED LYMPH NODES: ICD-10-CM

## 2023-08-08 DIAGNOSIS — E87.6 DIURETIC-INDUCED HYPOKALEMIA: Primary | ICD-10-CM

## 2023-08-08 PROCEDURE — 77049 MRI BREAST C-+ W/CAD BI: CPT | Mod: TC

## 2023-08-08 PROCEDURE — 77049 MRI BREAST W/WO CONTRAST, W/CAD, BILATERAL: ICD-10-PCS | Mod: 26,,, | Performed by: RADIOLOGY

## 2023-08-08 PROCEDURE — A9577 INJ MULTIHANCE: HCPCS

## 2023-08-08 PROCEDURE — 77049 MRI BREAST C-+ W/CAD BI: CPT | Mod: 26,,, | Performed by: RADIOLOGY

## 2023-08-08 PROCEDURE — 25500020 PHARM REV CODE 255

## 2023-08-08 RX ORDER — SPIRONOLACTONE 50 MG/1
50 TABLET, FILM COATED ORAL DAILY
Qty: 30 TABLET | Refills: 2 | Status: SHIPPED | OUTPATIENT
Start: 2023-08-08 | End: 2023-11-26 | Stop reason: SDUPTHER

## 2023-08-08 RX ADMIN — GADOBENATE DIMEGLUMINE 14 ML: 529 INJECTION, SOLUTION INTRAVENOUS at 03:08

## 2023-08-09 NOTE — PROGRESS NOTES
Abnormal lab result, Rx have been sent to the pharmacy.  Your lab shows that your potassium level is still low.  Prolonged low level of potassium can cause muscle weakness, irregular heartbeat, reduced kidney function and glucose intolerance.  Intentionally eat foods that are high in potassium such as beans, banana, sweet potatoes and avocado.  STOP taking hydrochlorothiazide and replace with spironolactone-see your pharmacy.  Continue your potassium as ordered for one week and go to once a day until you run out or you recheck your level.   Repeat BMP in one month. Thank you.

## 2023-08-10 ENCOUNTER — DOCUMENTATION ONLY (OUTPATIENT)
Dept: HEMATOLOGY/ONCOLOGY | Facility: CLINIC | Age: 53
End: 2023-08-10
Payer: OTHER GOVERNMENT

## 2023-08-10 ENCOUNTER — TELEPHONE (OUTPATIENT)
Dept: PRIMARY CARE CLINIC | Facility: CLINIC | Age: 53
End: 2023-08-10
Payer: OTHER GOVERNMENT

## 2023-08-10 NOTE — PROGRESS NOTES
Called and spoke with Diamante Smiley @ [phone] to inquire about u/s orders needed. Diamante states PCP has to place the order. Called PCP, Dr. Jennifer Pelletier and spoke with nurse Pitcher. Explained my role as NN and reason for call: to request orders for left breast u/s, u/s guided bx and mri guided bx. Explained that pt had breast mri 8/8 and recs were the above. Nurse Pitcher and Dr. Pelletier on the call and state they will reach out to referral mgmt to make sure they send what's needed. I offered to fax the MRI results to them so they can see the recs, she gave fax # [phone] and asked for my direct number, which I gave. States they will get it taken care of and call me back. Gave them our [facility] rep's fax# [phone]. MRI results from 8/8 faxed,   Oncology Navigation   Intake  Date Worked: 08/10/23     Treatment                              Acuity      Follow Up  No follow-ups on file.     Will follow.

## 2023-08-14 RX ORDER — FLUVOXAMINE MALEATE 100 MG/1
TABLET, COATED ORAL
Qty: 30 TABLET | Refills: 2 | Status: SHIPPED | OUTPATIENT
Start: 2023-08-14 | End: 2024-02-20 | Stop reason: ALTCHOICE

## 2023-08-22 ENCOUNTER — HOSPITAL ENCOUNTER (OUTPATIENT)
Dept: RADIOLOGY | Facility: HOSPITAL | Age: 53
Discharge: HOME | End: 2023-08-22
Attending: NURSE PRACTITIONER
Payer: OTHER GOVERNMENT

## 2023-08-22 ENCOUNTER — TELEPHONE (OUTPATIENT)
Dept: RADIOLOGY | Facility: HOSPITAL | Age: 53
End: 2023-08-22
Payer: OTHER GOVERNMENT

## 2023-08-22 DIAGNOSIS — R92.8 ABNORMAL MRI, BREAST: ICD-10-CM

## 2023-08-22 PROCEDURE — 76642 US BREAST LEFT LIMITED: ICD-10-PCS | Mod: 26,LT,, | Performed by: STUDENT IN AN ORGANIZED HEALTH CARE EDUCATION/TRAINING PROGRAM

## 2023-08-22 PROCEDURE — 76642 ULTRASOUND BREAST LIMITED: CPT | Mod: TC,LT

## 2023-08-22 PROCEDURE — 76642 ULTRASOUND BREAST LIMITED: CPT | Mod: 26,LT,, | Performed by: STUDENT IN AN ORGANIZED HEALTH CARE EDUCATION/TRAINING PROGRAM

## 2023-08-22 NOTE — TELEPHONE ENCOUNTER
Spoke with patient on the phone regarding breast ultrasound, patient stated that she will get MRI guided biopsy done at [facility]. Patient has my contact information.

## 2023-09-14 ENCOUNTER — OFFICE VISIT (OUTPATIENT)
Dept: PRIMARY CARE CLINIC | Facility: CLINIC | Age: 53
End: 2023-09-14
Payer: MEDICARE

## 2023-09-14 VITALS
WEIGHT: 139.63 LBS | HEIGHT: 61 IN | SYSTOLIC BLOOD PRESSURE: 108 MMHG | DIASTOLIC BLOOD PRESSURE: 72 MMHG | BODY MASS INDEX: 26.36 KG/M2 | TEMPERATURE: 98 F

## 2023-09-14 DIAGNOSIS — I10 ESSENTIAL HYPERTENSION: Primary | ICD-10-CM

## 2023-09-14 DIAGNOSIS — R59.0 LYMPHADENOPATHY, AXILLARY: ICD-10-CM

## 2023-09-14 DIAGNOSIS — E87.6 HYPOKALEMIA: ICD-10-CM

## 2023-09-14 DIAGNOSIS — E88.819 INSULIN RESISTANCE: ICD-10-CM

## 2023-09-14 PROCEDURE — 3078F DIAST BP <80 MM HG: CPT | Mod: HCNC,CPTII,S$GLB, | Performed by: FAMILY MEDICINE

## 2023-09-14 PROCEDURE — 3008F BODY MASS INDEX DOCD: CPT | Mod: HCNC,CPTII,S$GLB, | Performed by: FAMILY MEDICINE

## 2023-09-14 PROCEDURE — 99999 PR PBB SHADOW E&M-EST. PATIENT-LVL IV: ICD-10-PCS | Mod: PBBFAC,HCNC,, | Performed by: FAMILY MEDICINE

## 2023-09-14 PROCEDURE — 1159F PR MEDICATION LIST DOCUMENTED IN MEDICAL RECORD: ICD-10-PCS | Mod: HCNC,CPTII,S$GLB, | Performed by: FAMILY MEDICINE

## 2023-09-14 PROCEDURE — 3074F PR MOST RECENT SYSTOLIC BLOOD PRESSURE < 130 MM HG: ICD-10-PCS | Mod: HCNC,CPTII,S$GLB, | Performed by: FAMILY MEDICINE

## 2023-09-14 PROCEDURE — 4010F PR ACE/ARB THEARPY RXD/TAKEN: ICD-10-PCS | Mod: HCNC,CPTII,S$GLB, | Performed by: FAMILY MEDICINE

## 2023-09-14 PROCEDURE — 3078F PR MOST RECENT DIASTOLIC BLOOD PRESSURE < 80 MM HG: ICD-10-PCS | Mod: HCNC,CPTII,S$GLB, | Performed by: FAMILY MEDICINE

## 2023-09-14 PROCEDURE — 99214 OFFICE O/P EST MOD 30 MIN: CPT | Mod: HCNC,S$GLB,, | Performed by: FAMILY MEDICINE

## 2023-09-14 PROCEDURE — 4010F ACE/ARB THERAPY RXD/TAKEN: CPT | Mod: HCNC,CPTII,S$GLB, | Performed by: FAMILY MEDICINE

## 2023-09-14 PROCEDURE — 1159F MED LIST DOCD IN RCRD: CPT | Mod: HCNC,CPTII,S$GLB, | Performed by: FAMILY MEDICINE

## 2023-09-14 PROCEDURE — 99999 PR PBB SHADOW E&M-EST. PATIENT-LVL IV: CPT | Mod: PBBFAC,HCNC,, | Performed by: FAMILY MEDICINE

## 2023-09-14 PROCEDURE — 3008F PR BODY MASS INDEX (BMI) DOCUMENTED: ICD-10-PCS | Mod: HCNC,CPTII,S$GLB, | Performed by: FAMILY MEDICINE

## 2023-09-14 PROCEDURE — 3074F SYST BP LT 130 MM HG: CPT | Mod: HCNC,CPTII,S$GLB, | Performed by: FAMILY MEDICINE

## 2023-09-14 PROCEDURE — 99214 PR OFFICE/OUTPT VISIT, EST, LEVL IV, 30-39 MIN: ICD-10-PCS | Mod: HCNC,S$GLB,, | Performed by: FAMILY MEDICINE

## 2023-09-14 RX ORDER — POTASSIUM CHLORIDE 20 MEQ/15ML
20 SOLUTION ORAL DAILY
Qty: 473 ML | Refills: 0 | Status: SHIPPED | OUTPATIENT
Start: 2023-09-14 | End: 2023-10-26

## 2023-09-14 NOTE — PROGRESS NOTES
Subjective:       Patient ID: Irasema Vang is a 52 y.o. female.    Chief Complaint: Follow-up (Discuss medication )      History of Present Illness:   Irasema Vang 52 y.o. female presents today with     Hypokalemia due to diuretic:  HCTZ replaced with spironolactone. Latest lab k was normal at 3.5. Unable to swallow tab, asking for liquid. Further repletion not needed due to spironolactone.  BP is normal.  Insulin resistance: Lost more than she wanted with the Ozempic. But that's also because of recent tummy tuck.    Left axillary lymphadenopathy-biopsy done and waiting on result, site with delayed healing.   Still has right axilla lymphadenopathy, tender and not as big as it was. [facility]. Wants it biopsied too.    The 10-year ASCVD risk score (Chrissy ASHRAF, et al., 2019) is: 3.1%    Values used to calculate the score:      Age: 52 years      Sex: Female      Is Non- : Yes      Diabetic: No      Tobacco smoker: No      Systolic Blood Pressure: 108 mmHg      Is BP treated: Yes      HDL Cholesterol: 40 mg/dL      Total Cholesterol: 202 mg/dL     Past Medical History:   Diagnosis Date    Abnormal Pap smear     history of uterine cancer    Abnormal Pap smear of cervix     Asthma     no meds since age 20    Constipation, chronic     Depression     Family history of nephrolithiasis     General anesthetics causing adverse effect in therapeutic use     slow to wake up    History of cervical dysplasia 6/27/2013    Hypertension     Menopausal syndrome (hot flashes) 6/27/2013    Nephrolithiasis 6/27/2013    PONV (postoperative nausea and vomiting)     PTSD (post-traumatic stress disorder)     Renal cell carcinoma     Seizures     1 yr ago- caused by migraine meds--no treatment now    Urinoma     Uterine cancer 2000     Family History   Problem Relation Age of Onset    Cancer Maternal Grandmother     Diabetes Brother     Hypertension Mother     Cancer Father     Breast cancer  Paternal Aunt     Ovarian cancer Paternal Aunt     Breast cancer Paternal Aunt     Breast cancer Paternal Aunt     Colon cancer Neg Hx      Social History     Socioeconomic History    Marital status:    Tobacco Use    Smoking status: Never    Smokeless tobacco: Never   Substance and Sexual Activity    Alcohol use: Not Currently     Comment: occassionally     Drug use: Yes     Types: Marijuana     Comment: Liquid Cannabis Oil    Sexual activity: Not Currently     Partners: Male     Social Determinants of Health     Financial Resource Strain: Low Risk  (2/1/2023)    Overall Financial Resource Strain (CARDIA)     Difficulty of Paying Living Expenses: Not very hard   Food Insecurity: No Food Insecurity (2/1/2023)    Hunger Vital Sign     Worried About Running Out of Food in the Last Year: Never true     Ran Out of Food in the Last Year: Never true   Transportation Needs: No Transportation Needs (2/1/2023)    PRAPARE - Transportation     Lack of Transportation (Medical): No     Lack of Transportation (Non-Medical): No   Physical Activity: Inactive (2/1/2023)    Exercise Vital Sign     Days of Exercise per Week: 0 days     Minutes of Exercise per Session: 0 min   Stress: Stress Concern Present (2/1/2023)    [employer] - Occupational Stress Questionnaire     Feeling of Stress : To some extent   Social Connections: Moderately Isolated (2/1/2023)    Social Connection and Isolation Panel [NHANES]     Frequency of Communication with Friends and Family: More than three times a week     Frequency of Social Gatherings with Friends and Family: More than three times a week     Attends [employer] Services: More than 4 times per year     Active Member of Clubs or Organizations: No     Attends Club or Organization Meetings: Never     Marital Status:    Housing Stability: Low Risk  (2/1/2023)    Housing Stability Vital Sign     Unable to Pay for Housing in the Last Year: No     Number of  Places Lived in the Last Year: 2     Unstable Housing in the Last Year: No     Outpatient Encounter Medications as of 2023   Medication Sig Dispense Refill    EPINEPHrine (EPIPEN) 0.3 mg/0.3 mL AtIn Inject into the muscle as needed. 2 each 6    fluvoxaMINE (LUVOX) 100 MG tablet Take 1/2 tablet at bedtime for 7 days then take 1 tablet thereafter. 30 tablet 2    ibuprofen (ADVIL,MOTRIN) 800 MG tablet Take 1 tablet (800 mg total) by mouth every 6 (six) hours as needed for Pain. 30 tablet 1    losartan (COZAAR) 50 MG tablet Take 1 tablet (50 mg total) by mouth once daily. 90 tablet 3    semaglutide (OZEMPIC) 2 mg/dose (8 mg/3 mL) PnIj Inject 2 mg into the skin every 7 days. 3 each 3    spironolactone (ALDACTONE) 50 MG tablet Take 1 tablet (50 mg total) by mouth once daily. 30 tablet 2    vilazodone (VIIBRYD) 20 mg Tab Take 1 tablet (20 mg total) by mouth once daily. 30 tablet 2    zolpidem (AMBIEN) 5 MG Tab Take 1/2 to 1 tablet at bedtime as needed for sleep 30 tablet 2    buPROPion (WELLBUTRIN XL) 300 MG 24 hr tablet Take 1 tablet (300 mg total) by mouth once daily. (Patient not taking: Reported on 2023) 30 tablet 2    estradiol (DIVIGEL) 1 mg/gram (0.1 %) topical gel Place 1 g onto the skin once daily. 30 g 12    potassium chloride 10% (KAYCIEL) 20 mEq/15 mL oral solution Take 15 mLs (20 mEq total) by mouth once daily. 473 mL 0    [] potassium chloride SA (K-DUR,KLOR-CON) 20 MEQ tablet Take 1 tablet (20 mEq total) by mouth 3 (three) times daily. 90 tablet 2    triamcinolone acetonide 0.1% (KENALOG) 0.1 % cream Apply topically 2 (two) times daily. for 14 days 80 g 2    [DISCONTINUED] magnesium oxide (MAG-OX) 400 mg (241.3 mg magnesium) tablet Take 1 tablet (400 mg total) by mouth once daily. (Patient not taking: Reported on 2023) 90 tablet 0    [DISCONTINUED] omeprazole (PRILOSEC) 40 MG capsule Take 1 capsule (40 mg total) by mouth once daily. 30 capsule 11     Facility-Administered Encounter  "Medications as of 9/14/2023   Medication Dose Route Frequency Provider Last Rate Last Admin    lactated ringers infusion   Intravenous Continuous Darby Monroy MD   Stopped at 09/04/18 1347    nozaseptin (NOZIN) nasal    Each Nostril On Call Procedure Darby Monroy MD   Given at 09/04/18 1019    phenazopyridine tablet 200 mg  200 mg Oral On Call Procedure Darby Monroy MD   200 mg at 09/04/18 1108       Review of Systems      A complete 10 point ROS was completed and are positive as per above HPI. Otherwise negative for fever, diplopia, chest pain, shortness of breath, vomiting, blood in urine, joint pain, skin rash, seizures and unusual bleeding.     Objective:      /72 (BP Location: Right arm, Patient Position: Sitting, BP Method: Large (Manual))   Temp 98.1 °F (36.7 °C) (Oral)   Ht 5' 1" (1.549 m)   Wt 63.3 kg (139 lb 9.6 oz)   LMP 01/05/2001   BMI 26.38 kg/m²   Physical Exam    CONSTITUTIONAL: No apparent distress. Appears comfortable. Does not appear acutely ill or septic. Appears adequately hydrated.  CARDIOVASCULAR: No perioral cyanosis  PULMONARY: Breathing unlabored. No retractions Chest expansion grossly normal.  PSYCHIATRIC: Alert and conversant and grossly oriented. Mood is grossly neutral. Affect appropriate. Judgment and insight grossly intact.  NEUROLOGIC: No focal sensory deficits reported.   Results for orders placed or performed in visit on 08/07/23   Basic Metabolic Panel   Result Value Ref Range    Sodium 140 136 - 145 mmol/L    Potassium 3.3 (L) 3.5 - 5.1 mmol/L    Chloride 105 95 - 110 mmol/L    CO2 26 23 - 29 mmol/L    Glucose 81 70 - 110 mg/dL    BUN 10 6 - 20 mg/dL    Creatinine 0.7 0.5 - 1.4 mg/dL    Calcium 9.0 8.7 - 10.5 mg/dL    Anion Gap 9 8 - 16 mmol/L    eGFR >60.0 >60 mL/min/1.73 m^2   Magnesium   Result Value Ref Range    Magnesium 2.0 1.6 - 2.6 mg/dL     Assessment:       1. Essential hypertension    2. Hypokalemia    3. Insulin " resistance    4. Lymphadenopathy, axillary        Plan:   Essential hypertension  -     Hypertension Digital Medicine (HDMP) Enrollment Order  -     Hypertension Digital Medicine (HDMP): Assign Onboarding Questionnaires    Hypokalemia  -     potassium chloride 10% (KAYCIEL) 20 mEq/15 mL oral solution; Take 15 mLs (20 mEq total) by mouth once daily.  Dispense: 473 mL; Refill: 0    Insulin resistance  Comments:  doing well, down from size 12 to 3. ok to take med every 2 weeks to maintain.    Lymphadenopathy, axillary  Comments:  bilateral axillary        I have reviewed all of the patient's clinical history available in care everywhere and Epic and have utilized this in my evaluation and management recommendations today.      Treatment options and alternatives were discussed with the patient. Patient was given ample time to ask questions. All questions were answered. Voices understanding and acceptance of this advice. Will call back if any further questions or concerns.                Kaya Pal MD  [facility],

## 2023-10-18 RX ORDER — VILAZODONE HYDROCHLORIDE 20 MG/1
20 TABLET ORAL DAILY
Qty: 30 TABLET | Refills: 2 | Status: SHIPPED | OUTPATIENT
Start: 2023-10-18 | End: 2024-02-16 | Stop reason: SDUPTHER

## 2023-10-19 ENCOUNTER — PATIENT MESSAGE (OUTPATIENT)
Dept: PRIMARY CARE CLINIC | Facility: CLINIC | Age: 53
End: 2023-10-19
Payer: OTHER GOVERNMENT

## 2023-10-25 ENCOUNTER — PATIENT MESSAGE (OUTPATIENT)
Dept: PRIMARY CARE CLINIC | Facility: CLINIC | Age: 53
End: 2023-10-25
Payer: OTHER GOVERNMENT

## 2023-10-25 ENCOUNTER — HOSPITAL ENCOUNTER (EMERGENCY)
Facility: HOSPITAL | Age: 53
Discharge: HOME | End: 2023-10-25
Attending: EMERGENCY MEDICINE
Payer: OTHER GOVERNMENT

## 2023-10-25 VITALS
HEIGHT: 61 IN | RESPIRATION RATE: 16 BRPM | OXYGEN SATURATION: 98 % | TEMPERATURE: 98 F | BODY MASS INDEX: 26.1 KG/M2 | HEART RATE: 67 BPM | WEIGHT: 138.25 LBS | SYSTOLIC BLOOD PRESSURE: 120 MMHG | DIASTOLIC BLOOD PRESSURE: 81 MMHG

## 2023-10-25 DIAGNOSIS — R31.9 URINARY TRACT INFECTION WITH HEMATURIA, SITE UNSPECIFIED: Primary | ICD-10-CM

## 2023-10-25 DIAGNOSIS — N39.0 URINARY TRACT INFECTION WITH HEMATURIA, SITE UNSPECIFIED: Primary | ICD-10-CM

## 2023-10-25 LAB
ALBUMIN SERPL BCP-MCNC: 3.7 G/DL (ref 3.5–5.2)
ALP SERPL-CCNC: 65 U/L (ref 55–135)
ALT SERPL W/O P-5'-P-CCNC: 65 U/L (ref 10–44)
ANION GAP SERPL CALC-SCNC: 9 MMOL/L (ref 8–16)
AST SERPL-CCNC: 54 U/L (ref 10–40)
BACTERIA #/AREA URNS HPF: ABNORMAL /HPF
BASOPHILS # BLD AUTO: 0.01 K/UL (ref 0–0.2)
BASOPHILS NFR BLD: 0.2 % (ref 0–1.9)
BILIRUB SERPL-MCNC: 0.8 MG/DL (ref 0.1–1)
BILIRUB UR QL STRIP: NEGATIVE
BUN SERPL-MCNC: 12 MG/DL (ref 6–20)
CALCIUM SERPL-MCNC: 9.7 MG/DL (ref 8.7–10.5)
CHLORIDE SERPL-SCNC: 102 MMOL/L (ref 95–110)
CLARITY UR: CLEAR
CO2 SERPL-SCNC: 27 MMOL/L (ref 23–29)
COLOR UR: YELLOW
CREAT SERPL-MCNC: 0.8 MG/DL (ref 0.5–1.4)
DIFFERENTIAL METHOD: ABNORMAL
EOSINOPHIL # BLD AUTO: 0.1 K/UL (ref 0–0.5)
EOSINOPHIL NFR BLD: 1.8 % (ref 0–8)
ERYTHROCYTE [DISTWIDTH] IN BLOOD BY AUTOMATED COUNT: 12.1 % (ref 11.5–14.5)
EST. GFR  (NO RACE VARIABLE): >60 ML/MIN/1.73 M^2
GLUCOSE SERPL-MCNC: 96 MG/DL (ref 70–110)
GLUCOSE UR QL STRIP: NEGATIVE
HCT VFR BLD AUTO: 36.5 % (ref 37–48.5)
HGB BLD-MCNC: 12.3 G/DL (ref 12–16)
HGB UR QL STRIP: ABNORMAL
HYALINE CASTS #/AREA URNS LPF: 1 /LPF
IMM GRANULOCYTES # BLD AUTO: 0.01 K/UL (ref 0–0.04)
IMM GRANULOCYTES NFR BLD AUTO: 0.2 % (ref 0–0.5)
INFLUENZA A, MOLECULAR: NEGATIVE
INFLUENZA B, MOLECULAR: NEGATIVE
KETONES UR QL STRIP: NEGATIVE
LACTATE SERPL-SCNC: 1 MMOL/L (ref 0.5–2.2)
LEUKOCYTE ESTERASE UR QL STRIP: ABNORMAL
LIPASE SERPL-CCNC: 76 U/L (ref 4–60)
LYMPHOCYTES # BLD AUTO: 1 K/UL (ref 1–4.8)
LYMPHOCYTES NFR BLD: 21.9 % (ref 18–48)
MCH RBC QN AUTO: 29.4 PG (ref 27–31)
MCHC RBC AUTO-ENTMCNC: 33.7 G/DL (ref 32–36)
MCV RBC AUTO: 87 FL (ref 82–98)
MICROSCOPIC COMMENT: ABNORMAL
MONOCYTES # BLD AUTO: 0.7 K/UL (ref 0.3–1)
MONOCYTES NFR BLD: 15.7 % (ref 4–15)
NEUTROPHILS # BLD AUTO: 2.6 K/UL (ref 1.8–7.7)
NEUTROPHILS NFR BLD: 60.2 % (ref 38–73)
NITRITE UR QL STRIP: NEGATIVE
NRBC BLD-RTO: 0 /100 WBC
PH UR STRIP: 6 [PH] (ref 5–8)
PLATELET # BLD AUTO: 154 K/UL (ref 150–450)
PMV BLD AUTO: 10.4 FL (ref 9.2–12.9)
POTASSIUM SERPL-SCNC: 3.4 MMOL/L (ref 3.5–5.1)
PROT SERPL-MCNC: 9.1 G/DL (ref 6–8.4)
PROT UR QL STRIP: ABNORMAL
RBC # BLD AUTO: 4.19 M/UL (ref 4–5.4)
RBC #/AREA URNS HPF: 18 /HPF (ref 0–4)
SARS-COV-2 RDRP RESP QL NAA+PROBE: NEGATIVE
SODIUM SERPL-SCNC: 138 MMOL/L (ref 136–145)
SP GR UR STRIP: 1.03 (ref 1–1.03)
SPECIMEN SOURCE: NORMAL
SQUAMOUS #/AREA URNS HPF: 2 /HPF
URN SPEC COLLECT METH UR: ABNORMAL
UROBILINOGEN UR STRIP-ACNC: NEGATIVE EU/DL
WBC # BLD AUTO: 4.39 K/UL (ref 3.9–12.7)
WBC #/AREA URNS HPF: 100 /HPF (ref 0–5)
WBC CLUMPS URNS QL MICRO: ABNORMAL

## 2023-10-25 PROCEDURE — 80053 COMPREHEN METABOLIC PANEL: CPT | Performed by: PHYSICIAN ASSISTANT

## 2023-10-25 PROCEDURE — 87040 BLOOD CULTURE FOR BACTERIA: CPT | Performed by: PHYSICIAN ASSISTANT

## 2023-10-25 PROCEDURE — 96365 THER/PROPH/DIAG IV INF INIT: CPT

## 2023-10-25 PROCEDURE — 99285 EMERGENCY DEPT VISIT HI MDM: CPT | Mod: 25

## 2023-10-25 PROCEDURE — 83605 ASSAY OF LACTIC ACID: CPT | Performed by: PHYSICIAN ASSISTANT

## 2023-10-25 PROCEDURE — 25000003 PHARM REV CODE 250: Performed by: PHYSICIAN ASSISTANT

## 2023-10-25 PROCEDURE — 85025 COMPLETE CBC W/AUTO DIFF WBC: CPT | Performed by: PHYSICIAN ASSISTANT

## 2023-10-25 PROCEDURE — 87086 URINE CULTURE/COLONY COUNT: CPT | Performed by: PHYSICIAN ASSISTANT

## 2023-10-25 PROCEDURE — 87502 INFLUENZA DNA AMP PROBE: CPT | Performed by: PHYSICIAN ASSISTANT

## 2023-10-25 PROCEDURE — 63600175 PHARM REV CODE 636 W HCPCS: Performed by: PHYSICIAN ASSISTANT

## 2023-10-25 PROCEDURE — 81000 URINALYSIS NONAUTO W/SCOPE: CPT | Performed by: PHYSICIAN ASSISTANT

## 2023-10-25 PROCEDURE — U0002 COVID-19 LAB TEST NON-CDC: HCPCS | Performed by: PHYSICIAN ASSISTANT

## 2023-10-25 PROCEDURE — 83690 ASSAY OF LIPASE: CPT | Performed by: PHYSICIAN ASSISTANT

## 2023-10-25 RX ADMIN — CEFTRIAXONE SODIUM 1 G: 1 INJECTION, POWDER, FOR SOLUTION INTRAMUSCULAR; INTRAVENOUS at 02:10

## 2023-10-25 RX ADMIN — SODIUM CHLORIDE 1000 ML: 9 INJECTION, SOLUTION INTRAVENOUS at 02:10

## 2023-10-26 ENCOUNTER — PATIENT OUTREACH (OUTPATIENT)
Dept: EMERGENCY MEDICINE | Facility: HOSPITAL | Age: 53
End: 2023-10-26
Payer: OTHER GOVERNMENT

## 2023-10-26 LAB — BACTERIA UR CULT: NO GROWTH

## 2023-10-26 NOTE — ED PROVIDER NOTES
History      Chief Complaint   Patient presents with    Flank Pain     Patient c/o intermittent right sided flank pain and chills.       Review of patient's allergies indicates:   Allergen Reactions    Shellfish containing products Rash and Hives    Latex, natural rubber Hives    Nitrofurantoin monohyd/m-cryst      urticaria    Peanut Hives    Codeine Itching and Anxiety    Gloves, latex with aloe vera Rash    Lisinopril Rash and Other (See Comments)     cough  Other reaction(s): Cough        HPI   HPI    10/25/2023, 8:40 PM   History obtained from the patient      History of Present Illness: Irasema Vang is a 52 y.o. female patient who presents to the Emergency Department for right sided flank pain and chills.  She was prescribed cefdinir for uti and has taken one dose.    No vomiting.  No further complaints or concerns at this time.           PCP: [facility]       Past Medical History:  Past Medical History:   Diagnosis Date    Abnormal Pap smear     history of uterine cancer    Abnormal Pap smear of cervix     Asthma     no meds since age 20    Constipation, chronic     Depression     Family history of nephrolithiasis     General anesthetics causing adverse effect in therapeutic use     slow to wake up    History of cervical dysplasia 6/27/2013    Hypertension     Menopausal syndrome (hot flashes) 6/27/2013    Nephrolithiasis 6/27/2013    PONV (postoperative nausea and vomiting)     PTSD (post-traumatic stress disorder)     Renal cell carcinoma     Seizures     1 yr ago- caused by migraine meds--no treatment now    Urinoma     Uterine cancer 2000         Past Surgical History:  Past Surgical History:   Procedure Laterality Date    COLONOSCOPY  05/17/2013    non-bleeding AVMs    COLONOSCOPY N/A 01/04/2023    Procedure: COLONOSCOPY;  Surgeon: Dalila Hernandez MD;  Location: [facility];  Service: Endoscopy;  Laterality: N/A;    COLONOSCOPY W/ POLYPECTOMY      cystoscopy with  stent placement      ESOPHAGOGASTRODUODENOSCOPY  05/17/2013    HYSTERECTOMY      LAPAROSCOPIC LYSIS OF ADHESIONS N/A 09/04/2018    Procedure: LYSIS, ADHESIONS, LAPAROSCOPIC;  Surgeon: Darby Monroy MD;  Location: [facility] OR;  Service: OB/GYN;  Laterality: N/A;    LAPAROSCOPIC OOPHORECTOMY Left 09/04/2018    Procedure: OOPHORECTOMY, LAPAROSCOPIC;  Surgeon: Darby Monroy MD;  Location: [facility] OR;  Service: OB/GYN;  Laterality: Left;    LAPAROSCOPIC SURGICAL REMOVAL OF CYST OF OVARY Right 09/04/2018    Procedure: EXCISION, CYST, OVARY, LAPAROSCOPIC;  Surgeon: Darby Monroy MD;  Location: [facility] OR;  Service: OB/GYN;  Laterality: Right;    Laparoscopy  09/05/2017    OOPHORECTOMY      1 ovary removed    SURGICAL REMOVAL OF LYMPH NODE Left 07/13/2023    URETEROSCOPY             Family History:  Family History   Problem Relation Age of Onset    Cancer Maternal Grandmother     Diabetes Brother     Hypertension Mother     Cancer Father     Breast cancer Paternal Aunt     Ovarian cancer Paternal Aunt     Breast cancer Paternal Aunt     Breast cancer Paternal Aunt     Colon cancer Neg Hx            Social History:  Social History     Tobacco Use    Smoking status: Never    Smokeless tobacco: Never   Substance and Sexual Activity    Alcohol use: Not Currently     Comment: occassionally     Drug use: Yes     Types: Marijuana     Comment: Liquid Cannabis Oil    Sexual activity: Not Currently     Partners: Male       ROS     Review of Systems   Constitutional:  Positive for chills.   Genitourinary:  Positive for flank pain.       Physical Exam      Initial Vitals [10/25/23 1147]   BP Pulse Resp Temp SpO2   120/78 72 16 97.9 °F (36.6 °C) 100 %      MAP       --         Physical Exam  Vital signs and nursing notes reviewed.  Constitutional: Patient is in NAD. Awake and alert. Well-developed and well-nourished.  Head: Atraumatic. Normocephalic.  Eyes:  EOM intact. Conjunctivae nl. No scleral icterus.  ENT:  "Mucous membranes are moist.   Neck: Supple.  No meningismus  Cardiovascular: Regular rate and rhythm. No murmurs, rubs, or gallops.   Pulmonary/Chest: No respiratory distress. Clear to auscultation bilaterally. No wheezing, rales, or rhonchi.  Abdominal: Soft. Non-distended. No TTP. No rebound, guarding, or rigidity. Good bowel sounds.  Genitourinary: No CVA tenderness  Musculoskeletal: Moves all extremities. No edema.   Skin: Warm and dry.  Neurological: Awake and alert. No acute focal neurological deficits are appreciated.  Psychiatric: Normal affect. Good eye contact. Appropriate in content.      ED Course          Procedures  ED Vital Signs:  Vitals:    10/25/23 1147 10/25/23 1632   BP: 120/78 120/81   Pulse: 72 67   Resp: 16 16   Temp: 97.9 °F (36.6 °C) 98.1 °F (36.7 °C)   TempSrc: Oral Oral   SpO2: 100% 98%   Weight: 62.7 kg (138 lb 3.7 oz)    Height: 5' 1" (1.549 m)          Results for orders placed or performed during the hospital encounter of 10/25/23   Influenza A & B by Molecular    Specimen: Nasopharyngeal Swab   Result Value Ref Range    Influenza A, Molecular Negative Negative    Influenza B, Molecular Negative Negative    Flu A & B Source NP    CBC auto differential   Result Value Ref Range    WBC 4.39 3.90 - 12.70 K/uL    RBC 4.19 4.00 - 5.40 M/uL    Hemoglobin 12.3 12.0 - 16.0 g/dL    Hematocrit 36.5 (L) 37.0 - 48.5 %    MCV 87 82 - 98 fL    MCH 29.4 27.0 - 31.0 pg    MCHC 33.7 32.0 - 36.0 g/dL    RDW 12.1 11.5 - 14.5 %    Platelets 154 150 - 450 K/uL    MPV 10.4 9.2 - 12.9 fL    Immature Granulocytes 0.2 0.0 - 0.5 %    Gran # (ANC) 2.6 1.8 - 7.7 K/uL    Immature Grans (Abs) 0.01 0.00 - 0.04 K/uL    Lymph # 1.0 1.0 - 4.8 K/uL    Mono # 0.7 0.3 - 1.0 K/uL    Eos # 0.1 0.0 - 0.5 K/uL    Baso # 0.01 0.00 - 0.20 K/uL    nRBC 0 0 /100 WBC    Gran % 60.2 38.0 - 73.0 %    Lymph % 21.9 18.0 - 48.0 %    Mono % 15.7 (H) 4.0 - 15.0 %    Eosinophil % 1.8 0.0 - 8.0 %    Basophil % 0.2 0.0 - 1.9 %    " Differential Method Automated    Comprehensive metabolic panel   Result Value Ref Range    Sodium 138 136 - 145 mmol/L    Potassium 3.4 (L) 3.5 - 5.1 mmol/L    Chloride 102 95 - 110 mmol/L    CO2 27 23 - 29 mmol/L    Glucose 96 70 - 110 mg/dL    BUN 12 6 - 20 mg/dL    Creatinine 0.8 0.5 - 1.4 mg/dL    Calcium 9.7 8.7 - 10.5 mg/dL    Total Protein 9.1 (H) 6.0 - 8.4 g/dL    Albumin 3.7 3.5 - 5.2 g/dL    Total Bilirubin 0.8 0.1 - 1.0 mg/dL    Alkaline Phosphatase 65 55 - 135 U/L    AST 54 (H) 10 - 40 U/L    ALT 65 (H) 10 - 44 U/L    eGFR >60 >60 mL/min/1.73 m^2    Anion Gap 9 8 - 16 mmol/L   Lactic acid, plasma #1   Result Value Ref Range    Lactate (Lactic Acid) 1.0 0.5 - 2.2 mmol/L   Urinalysis, Reflex to Urine Culture Urine, Clean Catch    Specimen: Urine   Result Value Ref Range    Specimen UA Urine, Clean Catch     Color, UA Yellow Yellow, Straw, Marie    Appearance, UA Clear Clear    pH, UA 6.0 5.0 - 8.0    Specific Gravity, UA 1.030 1.005 - 1.030    Protein, UA 1+ (A) Negative    Glucose, UA Negative Negative    Ketones, UA Negative Negative    Bilirubin (UA) Negative Negative    Occult Blood UA 1+ (A) Negative    Nitrite, UA Negative Negative    Urobilinogen, UA Negative <2.0 EU/dL    Leukocytes, UA 3+ (A) Negative   COVID-19 Rapid Screening   Result Value Ref Range    SARS-CoV-2 RNA, Amplification, Qual Negative Negative   Urinalysis Microscopic   Result Value Ref Range    RBC, UA 18 (H) 0 - 4 /hpf    WBC,  (H) 0 - 5 /hpf    WBC Clumps, UA Moderate (A) None-Rare    Bacteria Rare None-Occ /hpf    Squam Epithel, UA 2 /hpf    Hyaline Casts, UA 1 0-1/lpf /lpf    Microscopic Comment SEE COMMENT    Lipase   Result Value Ref Range    Lipase 76 (H) 4 - 60 U/L             Imaging Results:  Imaging Results              CT Renal Stone Study ABD Pelvis WO (Final result)  Result time 10/25/23 14:52:36      Final result by Conrad Manley III, MD (10/25/23 14:52:36)                   Impression:      Findings  which can be seen with pancreatitis.  Please correlate with appropriate laboratory analysis.  No evidence of pancreatic ductal dilatation, abnormal fluid collection or pancreatic calcification.    No renal calculi or hydronephrosis.    Moderate to large stool burden.    Small umbilical hernia containing fat, unchanged.    Status post hysterectomy.      Electronically signed by: Zeke Manley  Date:    10/25/2023  Time:    14:52               Narrative:    EXAMINATION:  CT RENAL STONE STUDY ABD PELVIS WO    CLINICAL HISTORY:  Flank pain, kidney stone suspected;    TECHNIQUE:  Low dose axial images, sagittal and coronal reformations were obtained from the lung bases to the pubic symphysis.  Contrast was not administered.    COMPARISON:  CT abdomen and pelvis 03/06/2023    FINDINGS:  The visualized lung bases are unremarkable.  Unchanged low attenuation along the medial aspect of the right kidney.  No obstructive renal calculi or hydronephrosis.  Multiple phleboliths within the pelvis along with iliac artery calcification, unchanged.  Numerous injection granulomas throughout the lower back and gluteal fat bilaterally.    No aortic aneurysm.  The liver, spleen and gallbladder are unremarkable.    The margins of the pancreas are ill-defined.  This can be seen with pancreatitis.  Please correlate with appropriate laboratory analysis.  No abnormal fluid collection.    Moderate to large stool burden.  No bowel obstruction, free air, free fluid or adenopathy.  Status post hysterectomy.  Small umbilical hernia containing fat, unchanged.  The appendix is normal.  Degenerative changes within the lumbar spine.                                       X-Ray Chest AP Portable (Final result)  Result time 10/25/23 13:34:32      Final result by Aniceto Fernandez MD (10/25/23 13:34:32)                   Impression:      No acute findings.      Electronically signed by: Aniceto Fernandez MD  Date:    10/25/2023  Time:    13:34                Narrative:    EXAMINATION:  XR CHEST AP PORTABLE    CLINICAL HISTORY:  fever;    TECHNIQUE:  Single frontal view of the chest was performed.    COMPARISON:  01/10/2018    FINDINGS:  The cardiomediastinal silhouette is normal.    The lungs are clear.  No pleural effusions.    No acute osseous findings.  No advanced arthritic changes.                                         The Emergency Provider reviewed the vital signs and test results, which are outlined above.    ED Discussion             Medication(s) given in the ER:  Medications   cefTRIAXone (ROCEPHIN) 1 g in dextrose 5 % in water (D5W) 100 mL IVPB (MB+) (0 g Intravenous Stopped 10/25/23 1459)   sodium chloride 0.9% bolus 1,000 mL 1,000 mL (0 mLs Intravenous Stopped 10/25/23 1500)            Follow-up Information       [facility] - Emergency Dept..    Specialty: Emergency Medicine  Why: If symptoms worsen  Contact information:  [address]  [facility] [address] [phone]             [facility] In 2 days.    Contact information:  [address] [facility] [id number] [phone]                                    Medication List        ASK your doctor about these medications      buPROPion 300 MG 24 hr tablet  Commonly known as: WELLBUTRIN XL  Take 1 tablet (300 mg total) by mouth once daily.     DivigeL 1 mg/gram (0.1 %) topical gel  Generic drug: estradioL  Place 1 g onto the skin once daily.     EPINEPHrine 0.3 mg/0.3 mL Atin  Commonly known as: EPIPEN  Inject into the muscle as needed.     fluvoxaMINE 100 MG tablet  Commonly known as: LUVOX  Take 1/2 tablet at bedtime for 7 days then take 1 tablet thereafter.     ibuprofen 800 MG tablet  Commonly known as: ADVIL,MOTRIN  Take 1 tablet (800 mg total) by mouth every 6 (six) hours as needed for Pain.     losartan 50 MG tablet  Commonly known as: COZAAR  Take 1 tablet (50 mg total) by mouth once daily.     potassium chloride 10% 20 mEq/15 mL oral  solution  Commonly known as: KAYCIEL  Take 15 mLs (20 mEq total) by mouth once daily.     spironolactone 50 MG tablet  Commonly known as: ALDACTONE  Take 1 tablet (50 mg total) by mouth once daily.     triamcinolone acetonide 0.1% 0.1 % cream  Commonly known as: KENALOG  Apply topically 2 (two) times daily. for 14 days     vilazodone 20 mg Tab  Commonly known as: VIIBRYD  Take 1 tablet (20 mg total) by mouth once daily.     zolpidem 5 MG Tab  Commonly known as: AMBIEN  Take 1/2 to 1 tablet at bedtime as needed for sleep                  Medical Decision Making      Discussed possible early pancreatitis, clear liquids for a couple of days.  Continue cefdinir but return to ER if worse.  Have urine rechecked by PCP in a couple of days      All findings were reviewed with the patient/family in detail.   All remaining questions and concerns were addressed at that time.  Patient/family has been counseled regarding the need for follow-up as well as the indication to return to the emergency room should new or worrisome developments occur.        MDM     Amount and/or Complexity of Data Reviewed  Clinical lab tests: ordered and reviewed                   Clinical Impression:        ICD-10-CM ICD-9-CM   1. Urinary tract infection with hematuria, site unspecified  N39.0 599.0    R31.9 599.70               Faith Brown PA-C  10/25/23 2042

## 2023-10-26 NOTE — PROGRESS NOTES
Spoke with patient and she is scheduled for her post ED 7-day follow up with Dr Pal on 10/31/23 at 1 pm. Patient will receive an appointment reminder.

## 2023-10-30 ENCOUNTER — PATIENT OUTREACH (OUTPATIENT)
Dept: EMERGENCY MEDICINE | Facility: HOSPITAL | Age: 53
End: 2023-10-30
Payer: OTHER GOVERNMENT

## 2023-10-30 LAB
BACTERIA BLD CULT: NORMAL
BACTERIA BLD CULT: NORMAL

## 2023-10-30 NOTE — PROGRESS NOTES
ED navigator reminded patient about her appointment for tomorrow 10/31/23 at 1 pm with Dr Pal through voicemail, as she did not answer the call. ED navigator to close encounter at this time.

## 2023-10-31 ENCOUNTER — OFFICE VISIT (OUTPATIENT)
Dept: PRIMARY CARE CLINIC | Facility: CLINIC | Age: 53
End: 2023-10-31
Payer: OTHER GOVERNMENT

## 2023-10-31 VITALS
WEIGHT: 142 LBS | TEMPERATURE: 98 F | OXYGEN SATURATION: 97 % | BODY MASS INDEX: 26.81 KG/M2 | SYSTOLIC BLOOD PRESSURE: 119 MMHG | DIASTOLIC BLOOD PRESSURE: 78 MMHG | HEIGHT: 61 IN | HEART RATE: 55 BPM

## 2023-10-31 DIAGNOSIS — Z12.11 COLON CANCER SCREENING: ICD-10-CM

## 2023-10-31 DIAGNOSIS — K58.1 IRRITABLE BOWEL SYNDROME WITH CONSTIPATION: Chronic | ICD-10-CM

## 2023-10-31 DIAGNOSIS — K85.80 OTHER ACUTE PANCREATITIS WITHOUT INFECTION OR NECROSIS: ICD-10-CM

## 2023-10-31 DIAGNOSIS — R23.2 HOT FLASHES: Primary | ICD-10-CM

## 2023-10-31 DIAGNOSIS — N39.0 RECURRENT UTI: ICD-10-CM

## 2023-10-31 PROBLEM — K85.90 ACUTE PANCREATITIS WITHOUT INFECTION OR NECROSIS: Status: ACTIVE | Noted: 2023-10-31

## 2023-10-31 PROBLEM — E66.01 SEVERE OBESITY: Status: RESOLVED | Noted: 2023-01-26 | Resolved: 2023-10-31

## 2023-10-31 PROCEDURE — 99999 PR PBB SHADOW E&M-EST. PATIENT-LVL IV: CPT | Mod: PBBFAC,,, | Performed by: FAMILY MEDICINE

## 2023-10-31 PROCEDURE — 99214 OFFICE O/P EST MOD 30 MIN: CPT | Mod: S$GLB,,, | Performed by: FAMILY MEDICINE

## 2023-10-31 PROCEDURE — 99214 PR OFFICE/OUTPT VISIT, EST, LEVL IV, 30-39 MIN: ICD-10-PCS | Mod: S$PBB,,, | Performed by: FAMILY MEDICINE

## 2023-10-31 PROCEDURE — 99214 OFFICE O/P EST MOD 30 MIN: CPT | Mod: PBBFAC,PN | Performed by: FAMILY MEDICINE

## 2023-10-31 PROCEDURE — 99999 PR PBB SHADOW E&M-EST. PATIENT-LVL IV: ICD-10-PCS | Mod: PBBFAC,,, | Performed by: FAMILY MEDICINE

## 2023-10-31 NOTE — PROGRESS NOTES
Subjective:       Patient ID: Irasema Vang is a 52 y.o. female.    Chief Complaint: Follow-up ER visits      History of Present Illness:   Irasema Vang 52 y.o. female presents today with     Since her last visi, she has been to the ER twice for UTI and pancreatitis with flank pain.  Sxs is resolving and she is still on abx for the E coli UTI.  CT showed a lot of stool burden.   She has IBS-c and on Linzess   Also complaining of hot flashes. She is on SSRIs which is been managed by psych.     Answers submitted by the patient for this visit:  Painful Urination Questionnaire (Submitted on 10/26/2023)  Chief Complaint: Dysuria  Chronicity: chronic  Onset: in the past 7 days  Frequency: intermittently  Progression since onset: rapidly worsening  Pain - numeric: 9/10  Fever: no fever  Sexually active?: Yes  History of pyelonephritis?: Yes  discharge: No  hesitancy: No  possible pregnancy: No  sweats: Yes  weight loss: Yes  withholding: No  behavior changes: Yes  Treatments tried: antibiotics  Improvement on treatment: mild  Pain severity: moderate  catheterization: No  diabetes insipidus: No  diabetes mellitus: No  genitourinary reflux: No  hypertension: Yes  recurrent UTIs: Yes  single kidney: No  STD: No  urinary stasis: No  urological procedure: Yes  kidney stones: Yes    Past Medical History:   Diagnosis Date    Abnormal Pap smear     history of uterine cancer    Abnormal Pap smear of cervix     Asthma     no meds since age 20    Constipation, chronic     Depression     Family history of nephrolithiasis     General anesthetics causing adverse effect in therapeutic use     slow to wake up    History of cervical dysplasia 6/27/2013    Hypertension     Menopausal syndrome (hot flashes) 6/27/2013    Nephrolithiasis 6/27/2013    PONV (postoperative nausea and vomiting)     PTSD (post-traumatic stress disorder)     Renal cell carcinoma     Seizures     1 yr ago- caused by migraine meds--no treatment  now    Urinoma     Uterine cancer 2000     Family History   Problem Relation Age of Onset    Cancer Maternal Grandmother     Diabetes Brother     Hypertension Mother     Cancer Father     Breast cancer Paternal Aunt     Ovarian cancer Paternal Aunt     Breast cancer Paternal Aunt     Breast cancer Paternal Aunt     Colon cancer Neg Hx      Social History     Socioeconomic History    Marital status:    Tobacco Use    Smoking status: Never    Smokeless tobacco: Never   Substance and Sexual Activity    Alcohol use: Not Currently     Comment: occassionally     Drug use: Yes     Types: Marijuana     Comment: Liquid Cannabis Oil    Sexual activity: Not Currently     Partners: Male     Social Determinants of Health     Financial Resource Strain: Low Risk  (2/1/2023)    Overall Financial Resource Strain (CARDIA)     Difficulty of Paying Living Expenses: Not very hard   Food Insecurity: No Food Insecurity (2/1/2023)    Hunger Vital Sign     Worried About Running Out of Food in the Last Year: Never true     Ran Out of Food in the Last Year: Never true   Transportation Needs: No Transportation Needs (2/1/2023)    PRAPARE - Transportation     Lack of Transportation (Medical): No     Lack of Transportation (Non-Medical): No   Physical Activity: Inactive (2/1/2023)    Exercise Vital Sign     Days of Exercise per Week: 0 days     Minutes of Exercise per Session: 0 min   Stress: Stress Concern Present (2/1/2023)    [employer] - Occupational Stress Questionnaire     Feeling of Stress : To some extent   Social Connections: Moderately Isolated (2/1/2023)    Social Connection and Isolation Panel [NHANES]     Frequency of Communication with Friends and Family: More than three times a week     Frequency of Social Gatherings with Friends and Family: More than three times a week     Attends Sabianism Services: More than 4 times per year     Active Member of Clubs or  Organizations: No     Attends Club or Organization Meetings: Never     Marital Status:    Housing Stability: Low Risk  (2023)    Housing Stability Vital Sign     Unable to Pay for Housing in the Last Year: No     Number of Places Lived in the Last Year: 2     Unstable Housing in the Last Year: No     Outpatient Encounter Medications as of 10/31/2023   Medication Sig Dispense Refill    EPINEPHrine (EPIPEN) 0.3 mg/0.3 mL AtIn Inject into the muscle as needed. 2 each 6    ibuprofen (ADVIL,MOTRIN) 800 MG tablet Take 1 tablet (800 mg total) by mouth every 6 (six) hours as needed for Pain. 30 tablet 1    losartan (COZAAR) 50 MG tablet Take 1 tablet (50 mg total) by mouth once daily. 90 tablet 3    spironolactone (ALDACTONE) 50 MG tablet Take 1 tablet (50 mg total) by mouth once daily. 30 tablet 2    vilazodone (VIIBRYD) 20 mg Tab Take 1 tablet (20 mg total) by mouth once daily. 30 tablet 2    zolpidem (AMBIEN) 5 MG Tab Take 1/2 to 1 tablet at bedtime as needed for sleep 30 tablet 2    estradiol (DIVIGEL) 1 mg/gram (0.1 %) topical gel Place 1 g onto the skin once daily. 30 g 12    fluvoxaMINE (LUVOX) 100 MG tablet Take 1/2 tablet at bedtime for 7 days then take 1 tablet thereafter. 30 tablet 2    [] potassium chloride 10% (KAYCIEL) 20 mEq/15 mL oral solution Take 15 mLs (20 mEq total) by mouth once daily. 473 mL 0    triamcinolone acetonide 0.1% (KENALOG) 0.1 % cream Apply topically 2 (two) times daily. for 14 days 80 g 2    [DISCONTINUED] buPROPion (WELLBUTRIN XL) 300 MG 24 hr tablet Take 1 tablet (300 mg total) by mouth once daily. (Patient not taking: Reported on 2023) 30 tablet 2     Facility-Administered Encounter Medications as of 10/31/2023   Medication Dose Route Frequency Provider Last Rate Last Admin    nozaseptin (NOZIN) nasal    Each Nostril On Call Procedure Darby Monroy MD   Given at 18 1019    phenazopyridine tablet 200 mg  200 mg Oral On  "Call Procedure Darby Monroy MD   200 mg at 09/04/18 1108    [DISCONTINUED] lactated ringers infusion   Intravenous Continuous Darby Monroy MD   Stopped at 09/04/18 1347       Review of Systems   Constitutional:  Positive for chills.   Gastrointestinal:  Positive for nausea. Negative for constipation and vomiting.   Genitourinary:  Positive for dysuria, frequency and hematuria.   Skin:  Negative for rash.       Objective:      /78 (BP Location: Right arm, Patient Position: Sitting)   Pulse (!) 55   Temp 97.7 °F (36.5 °C)   Ht 5' 1" (1.549 m)   Wt 64.4 kg (142 lb)   LMP 01/05/2001   SpO2 97%   BMI 26.83 kg/m²   Physical Exam  Vitals and nursing note reviewed.   Constitutional:       General: She is not in acute distress.     Appearance: She is well-developed.   HENT:      Head: Normocephalic and atraumatic.      Right Ear: External ear normal.      Left Ear: External ear normal.   Eyes:      Conjunctiva/sclera: Conjunctivae normal.   Neck:      Thyroid: No thyromegaly.   Cardiovascular:      Rate and Rhythm: Regular rhythm. Tachycardia present.      Pulses: Normal pulses.      Heart sounds: Normal heart sounds. No murmur heard.  Pulmonary:      Effort: Pulmonary effort is normal. No respiratory distress.      Breath sounds: Normal breath sounds.   Abdominal:      Tenderness: There is abdominal tenderness in the right upper quadrant, epigastric area and periumbilical area. There is no guarding. Negative signs include McBurney's sign.       Genitourinary:     Comments: deferred  Musculoskeletal:         General: No deformity.      Cervical back: Neck supple.   Lymphadenopathy:      Head:      Right side of head: No submandibular adenopathy.      Left side of head: No submandibular adenopathy.      Cervical: No cervical adenopathy.   Skin:     General: Skin is warm and dry.   Neurological:      Mental Status: She is alert and oriented to person, place, and time.   Psychiatric:        "  Behavior: Behavior normal.       Results for orders placed or performed during the hospital encounter of 10/25/23   Blood culture x two cultures. Draw prior to antibiotics.    Specimen: Peripheral, Antecubital, Right; Blood   Result Value Ref Range    Blood Culture, Routine No growth after 5 days.    Blood culture x two cultures. Draw prior to antibiotics.    Specimen: Peripheral, Antecubital, Right; Blood   Result Value Ref Range    Blood Culture, Routine No growth after 5 days.    Influenza A & B by Molecular    Specimen: Nasopharyngeal Swab   Result Value Ref Range    Influenza A, Molecular Negative Negative    Influenza B, Molecular Negative Negative    Flu A & B Source NP    Urine culture    Specimen: Urine   Result Value Ref Range    Urine Culture, Routine No growth    CBC auto differential   Result Value Ref Range    WBC 4.39 3.90 - 12.70 K/uL    RBC 4.19 4.00 - 5.40 M/uL    Hemoglobin 12.3 12.0 - 16.0 g/dL    Hematocrit 36.5 (L) 37.0 - 48.5 %    MCV 87 82 - 98 fL    MCH 29.4 27.0 - 31.0 pg    MCHC 33.7 32.0 - 36.0 g/dL    RDW 12.1 11.5 - 14.5 %    Platelets 154 150 - 450 K/uL    MPV 10.4 9.2 - 12.9 fL    Immature Granulocytes 0.2 0.0 - 0.5 %    Gran # (ANC) 2.6 1.8 - 7.7 K/uL    Immature Grans (Abs) 0.01 0.00 - 0.04 K/uL    Lymph # 1.0 1.0 - 4.8 K/uL    Mono # 0.7 0.3 - 1.0 K/uL    Eos # 0.1 0.0 - 0.5 K/uL    Baso # 0.01 0.00 - 0.20 K/uL    nRBC 0 0 /100 WBC    Gran % 60.2 38.0 - 73.0 %    Lymph % 21.9 18.0 - 48.0 %    Mono % 15.7 (H) 4.0 - 15.0 %    Eosinophil % 1.8 0.0 - 8.0 %    Basophil % 0.2 0.0 - 1.9 %    Differential Method Automated    Comprehensive metabolic panel   Result Value Ref Range    Sodium 138 136 - 145 mmol/L    Potassium 3.4 (L) 3.5 - 5.1 mmol/L    Chloride 102 95 - 110 mmol/L    CO2 27 23 - 29 mmol/L    Glucose 96 70 - 110 mg/dL    BUN 12 6 - 20 mg/dL    Creatinine 0.8 0.5 - 1.4 mg/dL    Calcium 9.7 8.7 - 10.5 mg/dL    Total Protein 9.1 (H) 6.0 - 8.4 g/dL    Albumin 3.7 3.5 - 5.2 g/dL     Total Bilirubin 0.8 0.1 - 1.0 mg/dL    Alkaline Phosphatase 65 55 - 135 U/L    AST 54 (H) 10 - 40 U/L    ALT 65 (H) 10 - 44 U/L    eGFR >60 >60 mL/min/1.73 m^2    Anion Gap 9 8 - 16 mmol/L   Lactic acid, plasma #1   Result Value Ref Range    Lactate (Lactic Acid) 1.0 0.5 - 2.2 mmol/L   Urinalysis, Reflex to Urine Culture Urine, Clean Catch    Specimen: Urine   Result Value Ref Range    Specimen UA Urine, Clean Catch     Color, UA Yellow Yellow, Straw, Marie    Appearance, UA Clear Clear    pH, UA 6.0 5.0 - 8.0    Specific Gravity, UA 1.030 1.005 - 1.030    Protein, UA 1+ (A) Negative    Glucose, UA Negative Negative    Ketones, UA Negative Negative    Bilirubin (UA) Negative Negative    Occult Blood UA 1+ (A) Negative    Nitrite, UA Negative Negative    Urobilinogen, UA Negative <2.0 EU/dL    Leukocytes, UA 3+ (A) Negative   COVID-19 Rapid Screening   Result Value Ref Range    SARS-CoV-2 RNA, Amplification, Qual Negative Negative   Urinalysis Microscopic   Result Value Ref Range    RBC, UA 18 (H) 0 - 4 /hpf    WBC,  (H) 0 - 5 /hpf    WBC Clumps, UA Moderate (A) None-Rare    Bacteria Rare None-Occ /hpf    Squam Epithel, UA 2 /hpf    Hyaline Casts, UA 1 0-1/lpf /lpf    Microscopic Comment SEE COMMENT    Lipase   Result Value Ref Range    Lipase 76 (H) 4 - 60 U/L     Assessment:       1. Hot flashes    2. Recurrent UTI    3. Colon cancer screening    4. Other acute pancreatitis without infection or necrosis    5. Irritable bowel syndrome with constipation        Plan:   1. Hot flashes  Overview:  New symptoms, chronic, due to medication, will discuss meds with psychiatrist  Cont vaginal estrogen.      2. Recurrent UTI  Overview:  Acute; due to E coli, Complete abx,       3. Colon cancer screening  -     Cologuard Screening (Multitarget Stool DNA); Future; Expected date: 10/31/2023    4. Other acute pancreatitis without infection or necrosis  Overview:  Acute, resolving, due to weight loss, Ozempic, she has  stopped inj, still on liquid diet,  upgrade meal as tolerated. RTC if sxs persists.      5. Irritable bowel syndrome with constipation  Overview:  -stool burden noted on CT, discussed maintenance of bowel protocol, cont Linzess           I have reviewed all of the patient's clinical history available in care everywhere and Epic and have utilized this in my evaluation and management recommendations today.      Treatment options and alternatives were discussed with the patient. Patient was given ample time to ask questions. All questions were answered. Voices understanding and acceptance of this advice. Will call back if any further questions or concerns.                Kaya Pal MD  [facility],

## 2023-11-06 PROBLEM — N63.25 BREAST LUMP ON LEFT SIDE AT 6 O'CLOCK POSITION: Status: ACTIVE | Noted: 2023-11-06

## 2023-11-06 NOTE — ASSESSMENT & PLAN NOTE
She has a fairly extensive breast and axillary history of her as of recent.    Please see discussion about lymph nodes below.    I reviewed her films and agree that the asymmetry in the left breast appeared extremely benign.  Upon review of her MRI guided biopsy, the area of 1st concern did not enhance as strongly at the time of biopsy and therefore was felt to be benign.  A second area did enhance more brightly and therefore it is the area that underwent core biopsy showing a benign results.  I feel that this area has been completely addressed and shows nothing suspicious at all.  I have discussed this case with the radiologist and they feel that nothing else surgically is required at this time.  I would like to repeat her mammogram in 6 months.  If there are changes we could certainly address those at that time.  I have encouraged her to examine herself monthly and report any changes in the interim.

## 2023-11-06 NOTE — PROGRESS NOTES
[facility]  Zeke Doherty MD, FACS  Tess Kessler NP-C      Date of Service: 11/7/2023    Chief Complaint:   Irasema Vang is a 52 y.o. female presenting today to discuss her recent breast history.  She reports no interval changes on her self-breast examination.     History of Present Illness:   Irasema Vang is a 52 y.o. female who presents on November 7, 2023 with a left breast mass that enhanced on MRI. MRI guided core biopsy showed benign changes with nothing atypical or cancerous.  [facility] area [facility]. MD:::Kaya Pal MD    Past Medical History:   Diagnosis Date    Abnormal Pap smear     history of uterine cancer    Abnormal Pap smear of cervix     Asthma     no meds since age 20    Breast lump on left side at 6 o'clock position 11/6/2023    Constipation, chronic     Depression     Family history of nephrolithiasis     General anesthetics causing adverse effect in therapeutic use     slow to wake up    History of cervical dysplasia 6/27/2013    Hypertension     LAD (lymphadenopathy) 11/7/2023    Menopausal syndrome (hot flashes) 6/27/2013    Nephrolithiasis 6/27/2013    PONV (postoperative nausea and vomiting)     PTSD (post-traumatic stress disorder)     Renal cell carcinoma     Seizures     1 yr ago- caused by migraine meds--no treatment now    Urinoma     Uterine cancer 2000      Past Surgical History:   Procedure Laterality Date    COLONOSCOPY  05/17/2013    non-bleeding AVMs    COLONOSCOPY N/A 01/04/2023    Procedure: COLONOSCOPY;  Surgeon: Dalila Hernandez MD;  Location: [facility];  Service: Endoscopy;  Laterality: N/A;    COLONOSCOPY W/ POLYPECTOMY      cystoscopy with stent placement      ESOPHAGOGASTRODUODENOSCOPY  05/17/2013    HYSTERECTOMY      LAPAROSCOPIC LYSIS OF ADHESIONS N/A 09/04/2018    Procedure: LYSIS, ADHESIONS, LAPAROSCOPIC;  Surgeon: Darby Monroy MD;  Location: [facility]  OR;  Service: OB/GYN;  Laterality: N/A;    LAPAROSCOPIC OOPHORECTOMY Left 09/04/2018    Procedure: OOPHORECTOMY, LAPAROSCOPIC;  Surgeon: Darby Monroy MD;  Location: [facility] OR;  Service: OB/GYN;  Laterality: Left;    LAPAROSCOPIC SURGICAL REMOVAL OF CYST OF OVARY Right 09/04/2018    Procedure: EXCISION, CYST, OVARY, LAPAROSCOPIC;  Surgeon: Darby Monroy MD;  Location: [facility] OR;  Service: OB/GYN;  Laterality: Right;    Laparoscopy  09/05/2017    OOPHORECTOMY      1 ovary removed    SURGICAL REMOVAL OF LYMPH NODE Left 07/13/2023    URETEROSCOPY          Current Outpatient Medications:     EPINEPHrine (EPIPEN) 0.3 mg/0.3 mL AtIn, Inject into the muscle as needed., Disp: 2 each, Rfl: 6    estradioL (ESTRACE) 0.01 % (0.1 mg/gram) vaginal cream, Place 1 g vaginally once daily., Disp: , Rfl:     fluvoxaMINE (LUVOX) 100 MG tablet, Take 1/2 tablet at bedtime for 7 days then take 1 tablet thereafter., Disp: 30 tablet, Rfl: 2    ibuprofen (ADVIL,MOTRIN) 800 MG tablet, Take 1 tablet (800 mg total) by mouth every 6 (six) hours as needed for Pain., Disp: 30 tablet, Rfl: 1    linaclotide (LINZESS ORAL), Take by mouth., Disp: , Rfl:     losartan (COZAAR) 50 MG tablet, Take 1 tablet (50 mg total) by mouth once daily., Disp: 90 tablet, Rfl: 3    spironolactone (ALDACTONE) 50 MG tablet, Take 1 tablet (50 mg total) by mouth once daily., Disp: 30 tablet, Rfl: 2    vilazodone (VIIBRYD) 20 mg Tab, Take 1 tablet (20 mg total) by mouth once daily., Disp: 30 tablet, Rfl: 2    zolpidem (AMBIEN) 5 MG Tab, Take 1/2 to 1 tablet at bedtime as needed for sleep, Disp: 30 tablet, Rfl: 2    triamcinolone acetonide 0.1% (KENALOG) 0.1 % cream, Apply topically 2 (two) times daily. for 14 days, Disp: 80 g, Rfl: 2  No current facility-administered medications for this visit.    Facility-Administered Medications Ordered in Other Visits:     nozaseptin (NOZIN) nasal , , Each Nostril, On Call Procedure, Darby Monroy MD,  Given at 09/04/18 1019    phenazopyridine tablet 200 mg, 200 mg, Oral, On Call Procedure, Darby Monroy MD, 200 mg at 09/04/18 1108   Review of patient's allergies indicates:   Allergen Reactions    Shellfish containing products Rash and Hives    Latex, natural rubber Hives    Nitrofurantoin monohyd/m-cryst      urticaria    Peanut Hives    Codeine Itching and Anxiety    Gloves, latex with aloe vera Rash    Lisinopril Rash and Other (See Comments)     cough  Other reaction(s): Cough      Social History     Tobacco Use    Smoking status: Never    Smokeless tobacco: Never   Substance Use Topics    Alcohol use: Not Currently     Comment: occassionally       Family History   Problem Relation Age of Onset    Hypertension Mother     Cancer Father     Diabetes Brother     Breast cancer Maternal Grandmother     Breast cancer Paternal Aunt     Ovarian cancer Paternal Aunt     Breast cancer Paternal Aunt     Breast cancer Paternal Aunt     Colon cancer Neg Hx         Review of Systems   Integumentary:  Negative for color change, rash, mole/lesion, breast mass, breast discharge and breast tenderness.   Breast: Negative for mass and tenderness     Physical Exam   Constitutional: She appears well-developed. She is cooperative.   HENT: Normocephalic.   Cardiovascular:  Normal rate and regular rhythm.            Pulmonary/Chest: She exhibits no tenderness and no bony tenderness.   Abdominal: Soft. Normal appearance.   Musculoskeletal: Intact with no deficits  Neurological: She is alert.   Skin: No rash noted.   Breast and Chest Wall Evaluation:   Right breast exhibits no mass, no nipple discharge, no skin change and some  tenderness.     Left breast exhibits no mass, no nipple discharge, no skin change and some tenderness.     Lymphadenopathy: No suspicious supraclavicular or axillary adenopathy. Tender in left axilla    MAMMOGRAM REPORT: stable asymmetry in the left breast    ULTRASOUND REPORT: nothing suspicious in  the area of MRI concern    MRI REPORT: non-mass-like enhancement in the left lower central breast    PATHOLOGY REPORT: benign fibroadenomatoid changes with nothing atypical or cancerous    NOTE:::We viewed her films together at today's visit.  We discussed the multiple views obtained and the important findings.  Even benign changes were mentioned and her questions were answered.  She knows that she may receive a formal letter or report from the Radiologist.  She is to contact us if she has questions.    ASSESSMENT and PLAN OF CARE     1. Breast lump on left side at 6 o'clock position  Assessment & Plan:  She has a fairly extensive breast and axillary history of her as of recent.    Please see discussion about lymph nodes below.    I reviewed her films and agree that the asymmetry in the left breast appeared extremely benign.  Upon review of her MRI guided biopsy, the area of 1st concern did not enhance as strongly at the time of biopsy and therefore was felt to be benign.  A second area did enhance more brightly and therefore it is the area that underwent core biopsy showing a benign results.  I feel that this area has been completely addressed and shows nothing suspicious at all.  I have discussed this case with the radiologist and they feel that nothing else surgically is required at this time.  I would like to repeat her mammogram in 6 months.  If there are changes we could certainly address those at that time.  I have encouraged her to examine herself monthly and report any changes in the interim.      2. LAD (lymphadenopathy)  Assessment & Plan:  The lymph nodes appeared to be reactive.  Upon questioning she had copious shots in each arm.  We certainly saw lot of lymphadenopathy that was reactive in nature following coated vaccination this.  Because her lymph nodes have been sampled and showed nothing atypical that should require no further follow-up.        Medical Decision Making: It is my impression that  this patient suffers all conditions contained in this medical document.  Each of these conditions did affect our plan of care and my medical decision making today.  It is my opinion that the medical decision making concerning this patient was of moderate to high difficulty based on the aforementioned conditions.  Any further recommendations will be communicated to the patient by me.  I have reviewed and verified her allergies, list of medications, medical and surgical histories, social history, and a pertinent review of symptoms.    Follow up:  March 2024 and prn for PEFRANCESCO (D)

## 2023-11-07 ENCOUNTER — OFFICE VISIT (OUTPATIENT)
Dept: SURGERY | Facility: CLINIC | Age: 53
End: 2023-11-07
Payer: OTHER GOVERNMENT

## 2023-11-07 DIAGNOSIS — R59.1 LAD (LYMPHADENOPATHY): ICD-10-CM

## 2023-11-07 DIAGNOSIS — N63.25 BREAST LUMP ON LEFT SIDE AT 6 O'CLOCK POSITION: ICD-10-CM

## 2023-11-07 PROCEDURE — 99204 PR OFFICE/OUTPT VISIT, NEW, LEVL IV, 45-59 MIN: ICD-10-PCS | Mod: S$PBB,,, | Performed by: SPECIALIST

## 2023-11-07 PROCEDURE — 99999 PR PBB SHADOW E&M-EST. PATIENT-LVL III: CPT | Mod: PBBFAC,,, | Performed by: SPECIALIST

## 2023-11-07 PROCEDURE — 99213 OFFICE O/P EST LOW 20 MIN: CPT | Mod: PBBFAC,PN | Performed by: SPECIALIST

## 2023-11-07 PROCEDURE — 99999 PR PBB SHADOW E&M-EST. PATIENT-LVL III: ICD-10-PCS | Mod: PBBFAC,,, | Performed by: SPECIALIST

## 2023-11-07 PROCEDURE — 99204 OFFICE O/P NEW MOD 45 MIN: CPT | Mod: S$GLB,,, | Performed by: SPECIALIST

## 2023-11-07 RX ORDER — ESTRADIOL 0.1 MG/G
1 CREAM VAGINAL DAILY
COMMUNITY

## 2023-11-07 NOTE — ASSESSMENT & PLAN NOTE
The lymph nodes appeared to be reactive.  Upon questioning she had copious shots in each arm.  We certainly saw lot of lymphadenopathy that was reactive in nature following coated vaccination this.  Because her lymph nodes have been sampled and showed nothing atypical that should require no further follow-up.

## 2023-11-13 LAB — NONINV COLON CA DNA+OCC BLD SCRN STL QL: NEGATIVE

## 2023-11-26 DIAGNOSIS — I10 ESSENTIAL HYPERTENSION: ICD-10-CM

## 2023-11-27 NOTE — TELEPHONE ENCOUNTER
Refill Routing Note   Medication(s) are not appropriate for processing by [facility] for the following reason(s):      - NO PCP LISTED IN EPIC; ROUTING TO DR WEINSTEIN AS LAST PRESCRIBING PROVIDER    [facility] action(s):  Route          Medication reconciliation completed: No     Appointments  past 12m or future 3m with PCP    Date Provider   Last Visit   10/31/2023 Kaya Weinstein MD   Next Visit   Visit date not found Kaya Weinstein MD   ED visits in past 90 days: 1        Note composed:10:50 AM 11/27/2023

## 2023-11-29 RX ORDER — SPIRONOLACTONE 50 MG/1
50 TABLET, FILM COATED ORAL DAILY
Qty: 90 TABLET | Refills: 1 | Status: SHIPPED | OUTPATIENT
Start: 2023-11-29 | End: 2024-05-30

## 2024-01-27 ENCOUNTER — OFFICE VISIT (OUTPATIENT)
Dept: URGENT CARE | Facility: CLINIC | Age: 54
End: 2024-01-27
Payer: MEDICARE

## 2024-01-27 VITALS
HEART RATE: 102 BPM | OXYGEN SATURATION: 98 % | SYSTOLIC BLOOD PRESSURE: 115 MMHG | HEIGHT: 62 IN | BODY MASS INDEX: 26.31 KG/M2 | DIASTOLIC BLOOD PRESSURE: 76 MMHG | TEMPERATURE: 98 F | RESPIRATION RATE: 16 BRPM | WEIGHT: 142.94 LBS

## 2024-01-27 DIAGNOSIS — H66.91 RIGHT OTITIS MEDIA, UNSPECIFIED OTITIS MEDIA TYPE: ICD-10-CM

## 2024-01-27 DIAGNOSIS — R05.9 COUGH, UNSPECIFIED TYPE: ICD-10-CM

## 2024-01-27 DIAGNOSIS — J06.9 VIRAL URI: Primary | ICD-10-CM

## 2024-01-27 LAB
CTP QC/QA: YES
SARS-COV-2 AG RESP QL IA.RAPID: NEGATIVE

## 2024-01-27 PROCEDURE — 87811 SARS-COV-2 COVID19 W/OPTIC: CPT | Mod: QW,S$GLB,, | Performed by: EMERGENCY MEDICINE

## 2024-01-27 PROCEDURE — 99214 OFFICE O/P EST MOD 30 MIN: CPT | Mod: S$GLB,,, | Performed by: EMERGENCY MEDICINE

## 2024-01-27 RX ORDER — CLOBETASOL PROPIONATE 0.46 MG/ML
SOLUTION TOPICAL 2 TIMES DAILY
COMMUNITY
Start: 2024-01-10

## 2024-01-27 RX ORDER — FLUCONAZOLE 150 MG/1
150 TABLET ORAL DAILY
Qty: 1 TABLET | Refills: 0 | Status: SHIPPED | OUTPATIENT
Start: 2024-01-27 | End: 2024-01-28

## 2024-01-27 RX ORDER — BETAMETHASONE DIPROPIONATE 0.5 MG/G
CREAM TOPICAL 2 TIMES DAILY
COMMUNITY
Start: 2023-09-07

## 2024-01-27 RX ORDER — FLUTICASONE PROPIONATE 50 MCG
2 SPRAY, SUSPENSION (ML) NASAL DAILY
Qty: 16 G | Refills: 0 | Status: SHIPPED | OUTPATIENT
Start: 2024-01-27

## 2024-01-27 RX ORDER — KETOCONAZOLE 20 MG/G
CREAM TOPICAL 2 TIMES DAILY
COMMUNITY
Start: 2024-01-10

## 2024-01-27 RX ORDER — AMOXICILLIN 500 MG/1
500 CAPSULE ORAL EVERY 12 HOURS
Qty: 20 CAPSULE | Refills: 0 | Status: SHIPPED | OUTPATIENT
Start: 2024-01-27 | End: 2024-02-06

## 2024-01-27 NOTE — PATIENT INSTRUCTIONS
Use over the counter(OTC) antihistamine like claritin or zyrtec daily.  Flonase: 2 sprays per nostril 2 times a day until ear pain improves.    Wait and see antibiotics:  Start Amoxil in 24-48 hours if the ear pain is getting any worse, especially if accompanied by fever.  Nasal saline drops: 2-4 drops per nostril 10-15 times a day.     Tylenol or Motrin for fever or pain per label instructions.  Consider use of OTC cough medicine like Robitussin DM.  Warm saltwater gargles to 3 times a day.    Rest and drink plenty of fluids.  Avoid OTC decongestants if you have high blood pressure. This includes multi-cold symptom preparations.    Consider permissive fever to allow your immune system to work.  However, if fever is not tolerable or is disrupting sleep, use Tylenol or Motrin per label instructions.    You are considered contagious until your systemic, or whole body, symptoms have resolved fully for 24 hours.  This includes fever, body aches, fatigue.    Follow up in 2-3 days with PCP if no improvement or any worsening.       Viral Upper Respiratory Infection Discharge Instructions, Adult   About this topic   You have an upper respiratory infection or URI. A URI can affect your nose, throat, ears, and sinuses. A virus is the cause of almost all URIs and antibiotics will not help you feel better more quickly. The common cold is an example of a viral URI.  URIs are easy to spread from person to person, most often through coughing or sneezing. A URI will almost always get better in a week or two without any treatment.         What care is needed at home?   Ask your doctor what you need to do when you go home. Make sure you ask questions if you do not understand what the doctor says.  If you smoke, try to quit. Your doctor or nurse can help.  Drink lots of fluids like water, juice, or broth. This will help replace any fluids lost if you have a runny nose or fever. Warm tea or soup can help soothe a sore throat.  If the  air in your home feels dry, use a cool mist humidifier. This can help a stuffy nose and make it easier to breathe.  You can also use saline nose drops to relieve stuffiness.  If you decide to take over-the-counter cough or cold medicines, follow the directions on the label carefully. Be sure you do not take more than 1 medicine that contains acetaminophen. Also, if you have a heart problem or high blood pressure, check with your doctor before you take any of these medicines.  Wash your hands often. Cough or sneeze into a tissue or your elbow instead of your hands. This will help keep others healthy.  What follow-up care is needed?   Your doctor may ask you to make visits to the office to check on your progress. Be sure to keep these visits.  What drugs may be needed?   The doctor may order drugs to:  Open up the tubes of your lungs  Treat viral infection  Relieve or stop coughing  Help with pain from a sore throat  Relieve runny and stuffy nose  Provide oxygen  Will physical activity be limited?   You need to rest for a few days to let your body recover from the infection.  What changes to diet are needed?   Eat soft foods like soup if swallowing is too painful.  What problems could happen?   Asthma attack  Sinus infections  Lung problems like pneumonia and bronchitis  Severe fluid loss. This is dehydration.  What can be done to prevent this health problem?   Wash your hands often with soap and water for at least 20 seconds, especially after coughing or sneezing. Alcohol-based hand sanitizers also work to kill the virus.  If you are sick, cover your mouth and nose with tissue when you cough or sneeze. You can also cough into your elbow. Throw away tissues in the trash and wash your hands after touching used tissues.  Do not get too close (kissing, hugging) to people who are sick.  Do not share towels or hankies with anyone who is sick. Clean commonly handled things like door handles, remotes, toys, and phones. Wipe  them with a disinfectant.  Stay away from crowded places.  Cover your nose and mouth when you sneeze or cough.  Take vitamin C to help build up your body's ability to fight disease.  Get a flu shot each year.  When do I need to call the doctor?   You have trouble breathing when talking or sitting still.  You have a fever of 100.4°F (38°C) or higher for several days, chills, a very bad sore throat, or ear or sinus pain.  You develop a new fever after several days of feeling the same or improving.  You develop chest pain when you cough.  You have a cough that lasts more than 10 days.  You cough up blood, or the color of the mucus you cough up changes.  Teach Back: Helping You Understand   The Teach Back Method helps you understand the information we are giving you. After you talk with the staff, tell them in your own words what you learned. This helps to make sure the staff has described each thing clearly. It also helps to explain things that may have been confusing. Before going home, make sure you can do these:  I can tell you about my condition.  I can tell you what may help ease my signs.  I can tell you what I will do if I have a fever, chills, breathing very fast, or trouble breathing.  Where can I learn more?   American Lung Association  https://www.lung.org/blog/can-you-exercise-with-a-cold   American Lung Association  https://www.lung.org/lung-health-diseases/lung-disease-lookup/influenza/facts-about-the-common-cold   NHS Choices  https://www.nhs.uk/conditions/respiratory-tract-infection/   UpToDate  https://www.uptodate.com/contents/the-common-cold-in-adults-beyond-the-basics   Last Reviewed Date   2021-06-08  Consumer Information Use and Disclaimer   This information is not specific medical advice and does not replace information you receive from your health care provider. This is only a brief summary of general information. It does NOT include all information about conditions, illnesses, injuries, tests,  procedures, treatments, therapies, discharge instructions or life-style choices that may apply to you. You must talk with your health care provider for complete information about your health and treatment options. This information should not be used to decide whether or not to accept your health care providers advice, instructions or recommendations. Only your health care provider has the knowledge and training to provide advice that is right for you.  Copyright   Copyright © 2021 [employer] and its affiliates and/or licensors. All rights reserved.

## 2024-01-27 NOTE — PROGRESS NOTES
"Subjective:      Patient ID: Irasema Vang is a 53 y.o. female.    Vitals:  height is 5' 2.21" (1.58 m) and weight is 64.8 kg (142 lb 15.5 oz). Her oral temperature is 98.3 °F (36.8 °C). Her blood pressure is 115/76 and her pulse is 102. Her respiration is 16 and oxygen saturation is 98%.     Chief Complaint: Otalgia    Pt is here today with right ear pain, cough, sinus congestion, sore throat and runny nose for appx 6 days. She is unsure if she was exposed to anything. She rates her ear pain 10/10. She has taken Zyrtec and Cloricidin Cold and Flu with a little bit of relief.    Otalgia   There is pain in the right ear. This is a new problem. The current episode started in the past 7 days. The problem occurs constantly. The problem has been gradually worsening. There has been no fever. The pain is at a severity of 10/10. The pain is severe. Associated symptoms include coughing, diarrhea, headaches, hearing loss, neck pain, rhinorrhea and a sore throat. Pertinent negatives include no abdominal pain, ear discharge, rash or vomiting. Treatments tried: Zyrtec, Cloricidin cold and flu. The treatment provided mild relief. There is no history of a chronic ear infection, hearing loss or a tympanostomy tube.       Constitution: Positive for chills and fever.   HENT:  Positive for ear pain, hearing loss and sore throat. Negative for ear discharge.    Neck: Positive for neck pain.   Respiratory:  Positive for cough.    Gastrointestinal:  Positive for diarrhea. Negative for abdominal pain and vomiting.   Skin:  Negative for rash.   Neurological:  Positive for headaches.      Objective:     Physical Exam   Constitutional: She is oriented to person, place, and time. She appears well-developed. She is cooperative.  Non-toxic appearance. She does not appear ill. No distress.   HENT:   Head: Normocephalic and atraumatic.   Ears:   Right Ear: Hearing and external ear normal.   Left Ear: Hearing, tympanic membrane and external ear " normal.      Comments: Bilateral middle ear effusions.  Left TM within normal limits.  Right TM is erythematous at the top and mildly swollen.  Middle ear effusion is clear  Nose: Congestion present. No mucosal edema, rhinorrhea or nasal deformity. No epistaxis. Right sinus exhibits no maxillary sinus tenderness and no frontal sinus tenderness. Left sinus exhibits no maxillary sinus tenderness and no frontal sinus tenderness.      Comments: Nasal mucosa is erythematous and congested.  No sinus tenderness to percussion.    Mouth/Throat: Uvula is midline and mucous membranes are normal. No trismus in the jaw. Normal dentition. No uvula swelling. Posterior oropharyngeal erythema present. No oropharyngeal exudate or posterior oropharyngeal edema.   Eyes: Conjunctivae and lids are normal. No scleral icterus. Extraocular movement intact   Neck: Trachea normal and phonation normal. Neck supple. No edema present. No erythema present. No neck rigidity present.   Cardiovascular: Normal rate, regular rhythm, normal heart sounds and normal pulses.   Pulmonary/Chest: Effort normal and breath sounds normal. No respiratory distress. She has no decreased breath sounds. She has no rhonchi.   Abdominal: Normal appearance.   Musculoskeletal: Normal range of motion.         General: No deformity. Normal range of motion.   Neurological: She is alert and oriented to person, place, and time. She exhibits normal muscle tone. Coordination normal.   Skin: Skin is warm, dry, intact, not diaphoretic and not pale.   Psychiatric: Her speech is normal and behavior is normal. Judgment and thought content normal.   Nursing note and vitals reviewed.      Assessment:     1. Viral URI    2. Cough, unspecified type    3. Right otitis media, unspecified otitis media type      Results for orders placed or performed in visit on 01/27/24   SARS Coronavirus 2 Antigen, POCT Manual Read   Result Value Ref Range    SARS Coronavirus 2 Antigen Negative Negative      Acceptable Yes        Plan:       Viral URI  -     fluticasone propionate (FLONASE) 50 mcg/actuation nasal spray; 2 sprays (100 mcg total) by Each Nostril route once daily.  Dispense: 16 g; Refill: 0    Cough, unspecified type  -     SARS Coronavirus 2 Antigen, POCT Manual Read    Right otitis media, unspecified otitis media type  -     amoxicillin (AMOXIL) 500 MG capsule; Take 1 capsule (500 mg total) by mouth every 12 (twelve) hours. for 10 days  Dispense: 20 capsule; Refill: 0  -     fluconazole (DIFLUCAN) 150 MG Tab; Take 1 tablet (150 mg total) by mouth once daily. for 1 day  Dispense: 1 tablet; Refill: 0          Medical Decision Making:   History:   I obtained history from: someone other than patient.       <> Summary of History: Patient's friend provide some of the history  Initial Assessment:   Otalgia  Differential Diagnosis:   Otitis media, otitis externa, middle ear effusion  Clinical Tests:   Lab Tests: Ordered and Reviewed       <> Summary of Lab: COVID negative  Urgent Care Management:  Patient with the beginnings of a right otitis media.  She has not been on a good program for nasal decongestion.  We reviewed use of nasal steroid spray and advised that she try this for couple of days before starting antibiotics for the ear.  She is in agreement with this plan and verbalizes understanding.

## 2024-02-05 PROBLEM — N39.0 RECURRENT UTI: Status: RESOLVED | Noted: 2023-10-31 | Resolved: 2024-02-05

## 2024-02-15 ENCOUNTER — PATIENT MESSAGE (OUTPATIENT)
Dept: PRIMARY CARE CLINIC | Facility: CLINIC | Age: 54
End: 2024-02-15
Payer: MEDICARE

## 2024-02-16 RX ORDER — VILAZODONE HYDROCHLORIDE 20 MG/1
20 TABLET ORAL DAILY
Qty: 30 TABLET | Refills: 2 | Status: SHIPPED | OUTPATIENT
Start: 2024-02-16 | End: 2024-02-20

## 2024-02-19 ENCOUNTER — OFFICE VISIT (OUTPATIENT)
Dept: PRIMARY CARE CLINIC | Facility: CLINIC | Age: 54
End: 2024-02-19
Payer: OTHER GOVERNMENT

## 2024-02-19 ENCOUNTER — PATIENT MESSAGE (OUTPATIENT)
Dept: PRIMARY CARE CLINIC | Facility: CLINIC | Age: 54
End: 2024-02-19

## 2024-02-19 DIAGNOSIS — Z53.21 PROCEDURE AND TREATMENT NOT CARRIED OUT DUE TO PATIENT LEAVING PRIOR TO BEING SEEN BY HEALTH CARE PROVIDER: Primary | ICD-10-CM

## 2024-02-19 PROCEDURE — 99499 UNLISTED E&M SERVICE: CPT | Mod: 95,,, | Performed by: NURSE PRACTITIONER

## 2024-02-20 ENCOUNTER — OFFICE VISIT (OUTPATIENT)
Dept: PRIMARY CARE CLINIC | Facility: CLINIC | Age: 54
End: 2024-02-20
Payer: MEDICARE

## 2024-02-20 ENCOUNTER — PATIENT MESSAGE (OUTPATIENT)
Dept: PSYCHIATRY | Facility: CLINIC | Age: 54
End: 2024-02-20
Payer: MEDICARE

## 2024-02-20 VITALS
OXYGEN SATURATION: 99 % | BODY MASS INDEX: 27.49 KG/M2 | TEMPERATURE: 98 F | WEIGHT: 149.38 LBS | DIASTOLIC BLOOD PRESSURE: 80 MMHG | SYSTOLIC BLOOD PRESSURE: 124 MMHG | HEIGHT: 62 IN

## 2024-02-20 DIAGNOSIS — N28.1 CYST OF LEFT KIDNEY: Primary | ICD-10-CM

## 2024-02-20 DIAGNOSIS — F33.0 MDD (MAJOR DEPRESSIVE DISORDER), RECURRENT EPISODE, MILD: ICD-10-CM

## 2024-02-20 DIAGNOSIS — M46.90 INFLAMMATORY SPONDYLOPATHY, UNSPECIFIED SPINAL REGION: ICD-10-CM

## 2024-02-20 DIAGNOSIS — F33.1 MAJOR DEPRESSIVE DISORDER, RECURRENT, MODERATE: Chronic | ICD-10-CM

## 2024-02-20 DIAGNOSIS — Z85.528 HISTORY OF RENAL CELL CANCER: ICD-10-CM

## 2024-02-20 DIAGNOSIS — G47.00 INSOMNIA, CONTROLLED: ICD-10-CM

## 2024-02-20 PROBLEM — C64.9 RENAL CELL CARCINOMA: Status: RESOLVED | Noted: 2017-08-24 | Resolved: 2024-02-20

## 2024-02-20 PROCEDURE — 99999 PR PBB SHADOW E&M-EST. PATIENT-LVL IV: CPT | Mod: PBBFAC,,, | Performed by: FAMILY MEDICINE

## 2024-02-20 PROCEDURE — 99214 OFFICE O/P EST MOD 30 MIN: CPT | Mod: S$GLB,,, | Performed by: FAMILY MEDICINE

## 2024-02-20 PROCEDURE — 99214 OFFICE O/P EST MOD 30 MIN: CPT | Mod: PBBFAC,PN | Performed by: FAMILY MEDICINE

## 2024-02-20 PROCEDURE — 81001 URINALYSIS AUTO W/SCOPE: CPT | Performed by: FAMILY MEDICINE

## 2024-02-20 RX ORDER — BUPROPION HYDROCHLORIDE 300 MG/1
300 TABLET ORAL DAILY
Qty: 30 TABLET | Refills: 2 | Status: SHIPPED | OUTPATIENT
Start: 2024-02-20 | End: 2024-04-24 | Stop reason: SDUPTHER

## 2024-02-20 NOTE — PATIENT INSTRUCTIONS
Maintain/Continue a heathy lifestyle.  Choose a diet rich in fruits, vegetables, and low-fat dairy products, but low in meats, sweets, and refined grains   Be more active. If you are able, walk for 35 mins 5 times a week.

## 2024-02-20 NOTE — PROGRESS NOTES
Subjective:       Patient ID: Irasema Vang is a 53 y.o. female.    Chief Complaint: Other Misc (SPOT ON KIDNEY)      History of Present Illness:   Irasema Vang 53 y.o. female presents today with Other Misc (SPOT ON KIDNEY)  History of left renal cell ca.  Latest CT showed some cysta along the medial border of the right kidney and acute on chronic pancreatitis.  She stopped the ozempic  US to characterize the cyst and UA to check for blood in urine  Past Medical History:   Diagnosis Date    Abnormal Pap smear     history of uterine cancer    Abnormal Pap smear of cervix     Asthma     no meds since age 20    Breast lump on left side at 6 o'clock position 11/06/2023    Constipation, chronic     Depression     Family history of nephrolithiasis     General anesthetics causing adverse effect in therapeutic use     slow to wake up    History of cervical dysplasia 06/27/2013    Hypertension     LAD (lymphadenopathy) 11/07/2023    Menopausal syndrome (hot flashes) 06/27/2013    Nephrolithiasis 06/27/2013    PONV (postoperative nausea and vomiting)     PTSD (post-traumatic stress disorder)     Renal cell carcinoma     Renal cell carcinoma 08/24/2017    Seizures     1 yr ago- caused by migraine meds--no treatment now    Urinoma     Uterine cancer 2000     Family History   Problem Relation Age of Onset    Hypertension Mother     Cancer Father     Diabetes Brother     Breast cancer Maternal Grandmother     Breast cancer Paternal Aunt     Ovarian cancer Paternal Aunt     Breast cancer Paternal Aunt     Breast cancer Paternal Aunt     Colon cancer Neg Hx      Social History     Socioeconomic History    Marital status:    Tobacco Use    Smoking status: Never     Passive exposure: Past    Smokeless tobacco: Never   Substance and Sexual Activity    Alcohol use: Not Currently     Comment: occassionally     Drug use: Yes     Types: Marijuana     Comment: Liquid Cannabis Oil    Sexual activity: Not Currently      Partners: Male     Social Determinants of Health     Financial Resource Strain: Low Risk  (2/16/2024)    Overall Financial Resource Strain (CARDIA)     Difficulty of Paying Living Expenses: Not hard at all   Food Insecurity: Food Insecurity Present (2/16/2024)    Hunger Vital Sign     Worried About Running Out of Food in the Last Year: Sometimes true     Ran Out of Food in the Last Year: Sometimes true   Transportation Needs: No Transportation Needs (2/16/2024)    PRAPARE - Transportation     Lack of Transportation (Medical): No     Lack of Transportation (Non-Medical): No   Physical Activity: Insufficiently Active (2/16/2024)    Exercise Vital Sign     Days of Exercise per Week: 2 days     Minutes of Exercise per Session: 30 min   Stress: Stress Concern Present (2/16/2024)    [employer] - Occupational Stress Questionnaire     Feeling of Stress : Very much   Social Connections: Unknown (2/16/2024)    Social Connection and Isolation Panel [NHANES]     Frequency of Communication with Friends and Family: Three times a week     Frequency of Social Gatherings with Friends and Family: Once a week     Active Member of Clubs or Organizations: Yes     Attends Club or Organization Meetings: More than 4 times per year     Marital Status:    Housing Stability: Low Risk  (2/16/2024)    Housing Stability Vital Sign     Unable to Pay for Housing in the Last Year: No     Number of Places Lived in the Last Year: 1     Unstable Housing in the Last Year: No     Outpatient Encounter Medications as of 2/20/2024   Medication Sig Dispense Refill    betamethasone dipropionate 0.05 % cream Apply topically 2 (two) times daily.      clobetasoL (TEMOVATE) 0.05 % external solution Apply topically 2 (two) times daily.      EPINEPHrine (EPIPEN) 0.3 mg/0.3 mL AtIn Inject into the muscle as needed. 2 each 6    estradioL (ESTRACE) 0.01 % (0.1 mg/gram) vaginal cream Place 1 g vaginally once daily.       fluticasone propionate (FLONASE) 50 mcg/actuation nasal spray 2 sprays (100 mcg total) by Each Nostril route once daily. 16 g 0    ibuprofen (ADVIL,MOTRIN) 800 MG tablet Take 1 tablet (800 mg total) by mouth every 6 (six) hours as needed for Pain. 30 tablet 1    ketoconazole (NIZORAL) 2 % cream Apply topically 2 (two) times daily.      linaclotide (LINZESS ORAL) Take by mouth.      losartan (COZAAR) 50 MG tablet Take 1 tablet (50 mg total) by mouth once daily. 90 tablet 3    spironolactone (ALDACTONE) 50 MG tablet Take 1 tablet (50 mg total) by mouth once daily. 90 tablet 1    [DISCONTINUED] fluvoxaMINE (LUVOX) 100 MG tablet Take 1/2 tablet at bedtime for 7 days then take 1 tablet thereafter. 30 tablet 2    [DISCONTINUED] vilazodone (VIIBRYD) 20 mg Tab Take 1 tablet (20 mg total) by mouth once daily. 30 tablet 2    [DISCONTINUED] zolpidem (AMBIEN) 5 MG Tab Take 1/2 to 1 tablet at bedtime as needed for sleep 30 tablet 2    triamcinolone acetonide 0.1% (KENALOG) 0.1 % cream Apply topically 2 (two) times daily. for 14 days 80 g 2     Facility-Administered Encounter Medications as of 2/20/2024   Medication Dose Route Frequency Provider Last Rate Last Admin    nozaseptin (NOZIN) nasal    Each Nostril On Call Procedure Darby Monroy MD   Given at 09/04/18 1019    phenazopyridine tablet 200 mg  200 mg Oral On Call Procedure Darby Monroy MD   200 mg at 09/04/18 1108       Review of Systems   Constitutional:  Negative for activity change and unexpected weight change.   HENT:  Negative for hearing loss, rhinorrhea and trouble swallowing.    Eyes:  Negative for discharge and visual disturbance.   Respiratory:  Negative for chest tightness and wheezing.    Cardiovascular:  Negative for chest pain and palpitations.   Gastrointestinal:  Positive for constipation. Negative for blood in stool, diarrhea and vomiting.   Endocrine: Positive for polydipsia. Negative for polyuria.   Genitourinary:   "Negative for difficulty urinating, dysuria, hematuria and menstrual problem.   Musculoskeletal:  Negative for arthralgias, joint swelling and neck pain.   Neurological:  Positive for headaches. Negative for weakness.   Psychiatric/Behavioral:  Positive for dysphoric mood. Negative for confusion.             A complete 10 point ROS was completed and are positive as per above HPI. Otherwise negative for fever, diplopia, chest pain, shortness of breath, vomiting, blood in urine, skin rash, seizures and unusual bleeding.     Objective:      /80 (BP Location: Left arm, Patient Position: Sitting, BP Method: Medium (Manual))   Temp 97.9 °F (36.6 °C)   Ht 5' 2" (1.575 m)   Wt 67.8 kg (149 lb 6.4 oz)   LMP 01/05/2001   SpO2 99%   BMI 27.33 kg/m²   Physical Exam  Vitals and nursing note reviewed.   Constitutional:       General: She is not in acute distress.     Appearance: She is well-developed.   HENT:      Head: Normocephalic and atraumatic.      Right Ear: External ear normal.      Left Ear: External ear normal.   Eyes:      Conjunctiva/sclera: Conjunctivae normal.   Neck:      Thyroid: No thyromegaly.   Cardiovascular:      Rate and Rhythm: Normal rate and regular rhythm.      Pulses: Normal pulses.      Heart sounds: Normal heart sounds. No murmur heard.  Pulmonary:      Effort: Pulmonary effort is normal. No respiratory distress.      Breath sounds: Normal breath sounds.   Genitourinary:     Comments: deferred  Musculoskeletal:         General: No deformity.      Cervical back: Neck supple.   Lymphadenopathy:      Head:      Right side of head: No submandibular adenopathy.      Left side of head: No submandibular adenopathy.      Cervical: No cervical adenopathy.   Skin:     General: Skin is warm and dry.   Neurological:      Mental Status: She is alert and oriented to person, place, and time.   Psychiatric:         Behavior: Behavior normal.           Results for orders placed or performed in visit on " 02/20/24   Urinalysis   Result Value Ref Range    Specimen UA Urine, Clean Catch     Color, UA Yellow Yellow, Straw, Marie    Appearance, UA Cloudy (A) Clear    pH, UA 5.0 5.0 - 8.0    Specific Gravity, UA 1.025 1.005 - 1.030    Protein, UA Negative Negative    Glucose, UA Negative Negative    Ketones, UA Negative Negative    Bilirubin (UA) Negative Negative    Occult Blood UA Negative Negative    Nitrite, UA Negative Negative    Leukocytes, UA Negative Negative   Urinalysis Microscopic   Result Value Ref Range    RBC, UA 4 0 - 4 /hpf    WBC, UA 6 (H) 0 - 5 /hpf    Bacteria Many (A) None-Occ /hpf    Squam Epithel, UA 5 /hpf    Microscopic Comment SEE COMMENT      Assessment:       1. Cyst of left kidney    2. Major depressive disorder, recurrent, moderate    3. Inflammatory spondylopathy, unspecified spinal region    4. MDD (major depressive disorder), recurrent episode, mild    5. History of renal cell cancer    6. Insomnia, controlled        Plan:   1. Cyst of left kidney  Overview:  CT 10/25/23 showed  cysts located to the medial aspect of the left kidney. She has solitary kidney d/t hx of renal ca. Study the cyst with US.    Orders:  -     US Retroperitoneal Limited; Future; Expected date: 02/20/2024  -     Urinalysis    2. Major depressive disorder, recurrent, moderate  Comments:  controlled    3. Inflammatory spondylopathy, unspecified spinal region  Comments:  questionable, inflammtory markers were neg 03/23 with JANICE which is non specific pos.    4. MDD (major depressive disorder), recurrent episode, mild    5. History of renal cell cancer  -     US Retroperitoneal Limited; Future; Expected date: 02/20/2024  -     Urinalysis    6. Insomnia, controlled    Other orders  -     Urinalysis Microscopic        I have reviewed all of the patient's clinical history available in care everywhere and Epic and have utilized this in my evaluation and management recommendations today.      Treatment options and alternatives  were discussed with the patient. Patient was given ample time to ask questions. All questions were answered. Voices understanding and acceptance of this advice. Will call back if any further questions or concerns.     Portions of the record may have been created with voice recognition software. Occasional wrong-word or sound-a-like substitutions may have occurred due to the inherent limitations of voice recognition software. Read the chart carefully and recognize, using context, where substitutions have occurred.               Kaya Pal MD  [facility],

## 2024-02-21 PROBLEM — G47.00 INSOMNIA, CONTROLLED: Status: ACTIVE | Noted: 2024-02-21

## 2024-02-21 LAB
BACTERIA #/AREA URNS AUTO: ABNORMAL /HPF
BILIRUB UR QL STRIP: NEGATIVE
CLARITY UR REFRACT.AUTO: ABNORMAL
COLOR UR AUTO: YELLOW
GLUCOSE UR QL STRIP: NEGATIVE
HGB UR QL STRIP: NEGATIVE
KETONES UR QL STRIP: NEGATIVE
LEUKOCYTE ESTERASE UR QL STRIP: NEGATIVE
MICROSCOPIC COMMENT: ABNORMAL
NITRITE UR QL STRIP: NEGATIVE
PH UR STRIP: 5 [PH] (ref 5–8)
PROT UR QL STRIP: NEGATIVE
RBC #/AREA URNS AUTO: 4 /HPF (ref 0–4)
SP GR UR STRIP: 1.02 (ref 1–1.03)
SQUAMOUS #/AREA URNS AUTO: 5 /HPF
URN SPEC COLLECT METH UR: ABNORMAL
WBC #/AREA URNS AUTO: 6 /HPF (ref 0–5)

## 2024-02-23 NOTE — PROGRESS NOTES
Your urine has normal range of red blood cell and that mary is likely collection issue rather than true infection because all other markers of infection are normal

## 2024-03-01 ENCOUNTER — HOSPITAL ENCOUNTER (OUTPATIENT)
Dept: RADIOLOGY | Facility: HOSPITAL | Age: 54
Discharge: HOME | End: 2024-03-01
Attending: FAMILY MEDICINE
Payer: MEDICARE

## 2024-03-01 DIAGNOSIS — Z85.528 HISTORY OF RENAL CELL CANCER: ICD-10-CM

## 2024-03-01 DIAGNOSIS — N28.1 CYST OF LEFT KIDNEY: ICD-10-CM

## 2024-03-01 PROCEDURE — 76770 US EXAM ABDO BACK WALL COMP: CPT | Mod: 26,,, | Performed by: RADIOLOGY

## 2024-03-01 PROCEDURE — 76770 US EXAM ABDO BACK WALL COMP: CPT | Mod: TC

## 2024-03-03 DIAGNOSIS — R59.1 LAD (LYMPHADENOPATHY): ICD-10-CM

## 2024-03-03 DIAGNOSIS — N63.25 BREAST LUMP ON LEFT SIDE AT 6 O'CLOCK POSITION: Primary | ICD-10-CM

## 2024-03-03 NOTE — PROGRESS NOTES
Date of Service: 3/20/2024      Chief Complaint:   Irasema Vang is a 53 y.o. female presenting today for her annual evaluation.  She is due for a mammogram. She reports no interval changes.     History of Present Illness:   Irasema Vang is a 52 y.o. female who presented on November 7, 2023 with a left breast mass that enhanced on MRI. MRI guided core biopsy showed benign changes with nothing atypical or cancerous.  [facility]. We reviewed her imaging and reports and felt that these could be followed conservatively.  MD:::Kaya Pal MD    Past Medical History:   Diagnosis Date    Abnormal Pap smear     history of uterine cancer    Abnormal Pap smear of cervix     Asthma     no meds since age 20    Breast lump on left side at 6 o'clock position 11/06/2023    Breast lump on right side at 9 o'clock position 03/20/2024    Constipation, chronic     Depression     Family history of nephrolithiasis     General anesthetics causing adverse effect in therapeutic use     slow to wake up    History of cervical dysplasia 06/27/2013    Hypertension     LAD (lymphadenopathy) 11/07/2023    Menopausal syndrome (hot flashes) 06/27/2013    Nephrolithiasis 06/27/2013    PONV (postoperative nausea and vomiting)     PTSD (post-traumatic stress disorder)     Renal cell carcinoma     Renal cell carcinoma 08/24/2017    Seizures     1 yr ago- caused by migraine meds--no treatment now    Urinoma     Uterine cancer 2000      Past Surgical History:   Procedure Laterality Date    COLONOSCOPY  05/17/2013    non-bleeding AVMs    COLONOSCOPY N/A 01/04/2023    Procedure: COLONOSCOPY;  Surgeon: Dalila Hernandez MD;  Location: [facility];  Service: Endoscopy;  Laterality: N/A;    COLONOSCOPY W/ POLYPECTOMY      cystoscopy with stent placement      ESOPHAGOGASTRODUODENOSCOPY  05/17/2013    HYSTERECTOMY      LAPAROSCOPIC LYSIS OF ADHESIONS N/A 09/04/2018    Procedure: LYSIS,  ADHESIONS, LAPAROSCOPIC;  Surgeon: Darby Monroy MD;  Location: [facility] OR;  Service: OB/GYN;  Laterality: N/A;    LAPAROSCOPIC OOPHORECTOMY Left 09/04/2018    Procedure: OOPHORECTOMY, LAPAROSCOPIC;  Surgeon: Darby Monroy MD;  Location: [facility] OR;  Service: OB/GYN;  Laterality: Left;    LAPAROSCOPIC SURGICAL REMOVAL OF CYST OF OVARY Right 09/04/2018    Procedure: EXCISION, CYST, OVARY, LAPAROSCOPIC;  Surgeon: Darby Monroy MD;  Location: [facility] OR;  Service: OB/GYN;  Laterality: Right;    Laparoscopy  09/05/2017    OOPHORECTOMY      1 ovary removed    SURGICAL REMOVAL OF LYMPH NODE Left 07/13/2023    URETEROSCOPY          Current Outpatient Medications:     betamethasone dipropionate 0.05 % cream, Apply topically 2 (two) times daily., Disp: , Rfl:     buPROPion (WELLBUTRIN XL) 300 MG 24 hr tablet, Take 1 tablet (300 mg total) by mouth once daily., Disp: 30 tablet, Rfl: 2    clobetasoL (TEMOVATE) 0.05 % external solution, Apply topically 2 (two) times daily., Disp: , Rfl:     EPINEPHrine (EPIPEN) 0.3 mg/0.3 mL AtIn, Inject into the muscle as needed., Disp: 2 each, Rfl: 6    estradioL (ESTRACE) 0.01 % (0.1 mg/gram) vaginal cream, Place 1 g vaginally once daily., Disp: , Rfl:     fluticasone propionate (FLONASE) 50 mcg/actuation nasal spray, 2 sprays (100 mcg total) by Each Nostril route once daily., Disp: 16 g, Rfl: 0    ibuprofen (ADVIL,MOTRIN) 800 MG tablet, Take 1 tablet (800 mg total) by mouth every 6 (six) hours as needed for Pain., Disp: 30 tablet, Rfl: 1    ketoconazole (NIZORAL) 2 % cream, Apply topically 2 (two) times daily., Disp: , Rfl:     linaclotide (LINZESS ORAL), Take by mouth., Disp: , Rfl:     losartan (COZAAR) 50 MG tablet, Take 1 tablet (50 mg total) by mouth once daily., Disp: 90 tablet, Rfl: 3    spironolactone (ALDACTONE) 50 MG tablet, Take 1 tablet (50 mg total) by mouth once daily., Disp: 90 tablet, Rfl: 1    triamcinolone acetonide 0.1% (KENALOG) 0.1 % cream, Apply  topically 2 (two) times daily. for 14 days, Disp: 80 g, Rfl: 2  No current facility-administered medications for this visit.    Facility-Administered Medications Ordered in Other Visits:     nozaseptin (NOZIN) nasal , , Each Nostril, On Call Procedure, Darby Monroy MD, Given at 09/04/18 1019    phenazopyridine tablet 200 mg, 200 mg, Oral, On Call Procedure, Darby Monroy MD, 200 mg at 09/04/18 1108   Review of patient's allergies indicates:   Allergen Reactions    Amlodipine Edema and Swelling    Lisinopril Swelling and Other (See Comments)     cough  Other reaction(s): Cough    Shellfish containing products Rash and Hives    Latex, natural rubber Hives    Nitrofurantoin monohyd/m-cryst      urticaria    Peanut Hives    Codeine Itching and Anxiety    Gloves, latex with aloe vera Rash    Mirabegron Rash      Social History     Tobacco Use    Smoking status: Never     Passive exposure: Past    Smokeless tobacco: Never   Substance Use Topics    Alcohol use: Not Currently     Comment: occassionally       Family History   Problem Relation Age of Onset    Hypertension Mother     Cancer Father     Diabetes Brother     Breast cancer Maternal Grandmother     Breast cancer Paternal Aunt     Ovarian cancer Paternal Aunt     Breast cancer Paternal Aunt     Breast cancer Paternal Aunt     Colon cancer Neg Hx         Review of Systems   Integumentary:  Negative for color change, rash, mole/lesion, thickening/swelling, dimpling of skin, drainage  Breast: Negative for mass and tenderness     Physical Exam   Constitutional: She appears well-developed. She is cooperative.   HENT: Normocephalic.   Cardiovascular:  Normal rate and regular rhythm.            Pulmonary/Chest: She exhibits no tenderness and no bony tenderness.   Abdominal: Soft. Normal appearance.   Musculoskeletal: Intact with no deficits  Neurological: She is alert.   Skin: No rash noted.   Breast and Chest Wall Evaluation:   Right breast  exhibits no mass but a density is present in the right breast laterally to the UOQ, no nipple discharge, no skin change and no tenderness.     Left breast exhibits no mass, no nipple discharge, no skin change and no tenderness.     Lymphadenopathy: No supraclavicular or axillary adenopathy.    MAMMOGRAM and US REPORT: will obtain today    ASSESSMENT and PLAN OF CARE     1. Breast lump on left side at 6 o'clock position  Assessment & Plan:  We reviewed our findings today and her questions were answered.  She understands that her exams have remained stable (and show nothing concerning).  She is comfortable being followed in a conservative fashion.      She understands the importance of monthly self-breast examination and knows to report any and all changes as they occur.          2. LAD (lymphadenopathy)  Assessment & Plan:  Her lymphadenopathy has remained stable and can be followed conservatively.      3. Breast lump on right side at 9 o'clock position  Assessment & Plan:  She has a new density at 9 o'clock in the right breast - I will obtain an ultrasound today.    Orders:  -     US Breast Right Limited; Future; Expected date: 03/20/2024    Medical Decision Making: It is my impression that this patient suffers all conditions contained in this medical document.  Each of these conditions did affect our plan of care and my medical decision making today.  It is my opinion that the medical decision making concerning this patient was of minimal  difficulty based on the aforementioned conditions.  Any further recommendations will be communicated to the patient by me.  I have reviewed and verified her allergies, list of medications, medical and surgical histories, social history, and a pertinent review of symptoms.     Follow up: 1 year and PRN    For: Physical Examination and MGM (D) at         3/20/2024 3:03 PM ADDENDUM:  The area in the right breast does show an ill-defined solid mass for which sonocore biopsy has  been recommended.  We will proceed and I'll phone her with the pathology report.    Zeke Doherty MD, FACS      3/26/2024 8:30 AM ADDENDUM: Her right sonocore breast biopsy showed benign changes, and nothing atypical or cancerous. This is DISCORDANT and the Radiologist has recommended excision.  I phoned this report to her and consented her over the phone.  All risks, benefits, indications and alternatives were discussed.  She wishes to proceed with PLAN:::RIGHT BREAST BIOPSY AFTER NEEDLE LOCALIZATION (OR REFLECTOR).    Zeke Doherty MD, FACS

## 2024-03-05 NOTE — PROGRESS NOTES
US of the kidney is normal except for a tiny stone in the left kidney which should not be causing any symptoms. Let me know if you continue to have symptoms.

## 2024-03-20 ENCOUNTER — OFFICE VISIT (OUTPATIENT)
Dept: SURGERY | Facility: CLINIC | Age: 54
End: 2024-03-20
Payer: MEDICARE

## 2024-03-20 DIAGNOSIS — R59.1 LAD (LYMPHADENOPATHY): ICD-10-CM

## 2024-03-20 DIAGNOSIS — N63.15 BREAST LUMP ON RIGHT SIDE AT 9 O'CLOCK POSITION: ICD-10-CM

## 2024-03-20 DIAGNOSIS — N63.25 BREAST LUMP ON LEFT SIDE AT 6 O'CLOCK POSITION: Primary | ICD-10-CM

## 2024-03-20 PROCEDURE — 99213 OFFICE O/P EST LOW 20 MIN: CPT | Mod: S$GLB,,, | Performed by: SPECIALIST

## 2024-03-20 PROCEDURE — 99211 OFF/OP EST MAY X REQ PHY/QHP: CPT | Mod: PBBFAC,PN | Performed by: SPECIALIST

## 2024-03-20 PROCEDURE — 99999 PR PBB SHADOW E&M-EST. PATIENT-LVL I: CPT | Mod: PBBFAC,,, | Performed by: SPECIALIST

## 2024-03-26 RX ORDER — HYDROMORPHONE HYDROCHLORIDE 4 MG/1
2 TABLET ORAL EVERY 6 HOURS PRN
Qty: 10 TABLET | Refills: 0 | Status: SHIPPED | OUTPATIENT
Start: 2024-03-26 | End: 2024-04-16

## 2024-03-27 ENCOUNTER — TELEPHONE (OUTPATIENT)
Dept: SURGERY | Facility: CLINIC | Age: 54
End: 2024-03-27
Payer: MEDICARE

## 2024-04-10 ENCOUNTER — TELEPHONE (OUTPATIENT)
Dept: SURGERY | Facility: CLINIC | Age: 54
End: 2024-04-10
Payer: MEDICARE

## 2024-04-15 NOTE — PROGRESS NOTES
[facility]  Zeke Doherty MD, FACS  Tess Kessler NP-C      Date of Service: 4/16/2024    Chief Complaint:   Irasema Vang is a 53 y.o. female presenting today for a post operative visit.  She is status pos right breast biopsy after needle localization .  She has done well post operatively and has no complaints.    History of Present Illness:   Irasema Vang is a 52 y.o. female who presented on November 7, 2023 with a left breast mass that enhanced on MRI. MRI guided core biopsy showed benign changes with nothing atypical or cancerous.  [facility]. We reviewed her imaging and reports and felt that these could be followed conservatively. In March 2024 she was noted with an ill-defined right breast mass. Sonocore biopsy was performed and discordant. Excisional biopsy revealed Focal papillomatosis,usual duct epithelial hyperplasia, cysts, dilated ducts, adenosis and stromal fibrosis. Calcifications noted in non-neoplastic tissue. Sequelae of previous biopsy noted. NEM noted.  MD:::Kaya Pal MD     Past Medical History:   Diagnosis Date    Abnormal Pap smear     history of uterine cancer    Abnormal Pap smear of cervix     Asthma     no meds since age 20    Breast lump on left side at 6 o'clock position 11/06/2023    Breast lump on right side at 9 o'clock position 03/20/2024    Constipation, chronic     Depression     Family history of nephrolithiasis     General anesthetics causing adverse effect in therapeutic use     slow to wake up    History of cervical dysplasia 06/27/2013    Hypertension     LAD (lymphadenopathy) 11/07/2023    Menopausal syndrome (hot flashes) 06/27/2013    Nephrolithiasis 06/27/2013    PONV (postoperative nausea and vomiting)     PTSD (post-traumatic stress disorder)     Renal cell carcinoma     Renal cell carcinoma 08/24/2017    Seizures     1 yr ago- caused by migraine meds--no  treatment now    Urinoma     Uterine cancer 2000      Past Surgical History:   Procedure Laterality Date    COLONOSCOPY  05/17/2013    non-bleeding AVMs    COLONOSCOPY N/A 01/04/2023    Procedure: COLONOSCOPY;  Surgeon: Dalila Hernandez MD;  Location: [facility];  Service: Endoscopy;  Laterality: N/A;    COLONOSCOPY W/ POLYPECTOMY      cystoscopy with stent placement      ESOPHAGOGASTRODUODENOSCOPY  05/17/2013    HYSTERECTOMY      LAPAROSCOPIC LYSIS OF ADHESIONS N/A 09/04/2018    Procedure: LYSIS, ADHESIONS, LAPAROSCOPIC;  Surgeon: Darby Monroy MD;  Location: [facility] OR;  Service: OB/GYN;  Laterality: N/A;    LAPAROSCOPIC OOPHORECTOMY Left 09/04/2018    Procedure: OOPHORECTOMY, LAPAROSCOPIC;  Surgeon: Darby Monroy MD;  Location: [facility] OR;  Service: OB/GYN;  Laterality: Left;    LAPAROSCOPIC SURGICAL REMOVAL OF CYST OF OVARY Right 09/04/2018    Procedure: EXCISION, CYST, OVARY, LAPAROSCOPIC;  Surgeon: Darby Monroy MD;  Location: [facility];  Service: OB/GYN;  Laterality: Right;    Laparoscopy  09/05/2017    OOPHORECTOMY      1 ovary removed    right breast biopsy after needle localization 4/2/2024      right breast sonocore biopsy3/2024      SURGICAL REMOVAL OF LYMPH NODE Left 07/13/2023    URETEROSCOPY          Current Outpatient Medications:     betamethasone dipropionate 0.05 % cream, Apply topically 2 (two) times daily., Disp: , Rfl:     buPROPion (WELLBUTRIN XL) 300 MG 24 hr tablet, Take 1 tablet (300 mg total) by mouth once daily., Disp: 30 tablet, Rfl: 2    clobetasoL (TEMOVATE) 0.05 % external solution, Apply topically 2 (two) times daily., Disp: , Rfl:     EPINEPHrine (EPIPEN) 0.3 mg/0.3 mL AtIn, Inject into the muscle as needed., Disp: 2 each, Rfl: 6    estradioL (ESTRACE) 0.01 % (0.1 mg/gram) vaginal cream, Place 1 g vaginally once daily., Disp: , Rfl:     fluticasone propionate (FLONASE) 50 mcg/actuation nasal spray, 2 sprays (100 mcg total) by Each Nostril route once daily.,  Disp: 16 g, Rfl: 0    HYDROmorphone (DILAUDID) 4 MG tablet, Take 0.5 tablets (2 mg total) by mouth every 6 (six) hours as needed for Pain., Disp: 10 tablet, Rfl: 0    ibuprofen (ADVIL,MOTRIN) 800 MG tablet, Take 1 tablet (800 mg total) by mouth every 6 (six) hours as needed for Pain., Disp: 30 tablet, Rfl: 1    ketoconazole (NIZORAL) 2 % cream, Apply topically 2 (two) times daily., Disp: , Rfl:     linaclotide (LINZESS ORAL), Take by mouth., Disp: , Rfl:     losartan (COZAAR) 50 MG tablet, Take 1 tablet (50 mg total) by mouth once daily., Disp: 90 tablet, Rfl: 3    spironolactone (ALDACTONE) 50 MG tablet, Take 1 tablet (50 mg total) by mouth once daily., Disp: 90 tablet, Rfl: 1    triamcinolone acetonide 0.1% (KENALOG) 0.1 % cream, Apply topically 2 (two) times daily. for 14 days, Disp: 80 g, Rfl: 2  No current facility-administered medications for this visit.    Facility-Administered Medications Ordered in Other Visits:     nozaseptin (NOZIN) nasal , , Each Nostril, On Call Procedure, Darby Monroy MD, Given at 09/04/18 1019    phenazopyridine tablet 200 mg, 200 mg, Oral, On Call Procedure, Darby Monroy MD, 200 mg at 09/04/18 1108   Review of patient's allergies indicates:   Allergen Reactions    Amlodipine Edema and Swelling    Lisinopril Swelling and Other (See Comments)     cough  Other reaction(s): Cough    Shellfish containing products Rash and Hives    Latex, natural rubber Hives    Nitrofurantoin monohyd/m-cryst      urticaria    Peanut Hives    Codeine Itching and Anxiety    Gloves, latex with aloe vera Rash    Mirabegron Rash      Social History     Tobacco Use    Smoking status: Never     Passive exposure: Past    Smokeless tobacco: Never   Substance Use Topics    Alcohol use: Not Currently     Comment: occassionally       Family History   Problem Relation Name Age of Onset    Hypertension Mother      Cancer Father      Diabetes Brother      Breast cancer Maternal  Grandmother      Breast cancer Paternal Aunt      Ovarian cancer Paternal Aunt      Breast cancer Paternal Aunt      Breast cancer Paternal Aunt      Colon cancer Neg Hx          Review of Systems   Constitutional:  Positive for activity change (slowed down after surgery). Negative for fever.   Respiratory:  Negative for shortness of breath.    Cardiovascular:  Negative for chest pain.   Integumentary:  Positive for breast tenderness (typical post operative). Negative for rash and wound.   Breast: Positive for tenderness (typical post operative).       Physical Exam   Pulmonary/Chest: Effort normal. She exhibits tenderness. She exhibits no swelling.   Right- Normal post op changes. Left- Deferred   Abdominal: Normal appearance.   Skin: No rash noted.          ASSESSMENT and PLAN OF CARE     1. Breast lump on right side at 9 o'clock position  Assessment & Plan:   She is doing great post operative and will alert me to any issues. Pathology reports were discussed in detail. All questions were answered and recommendations were made for future treatments/follow-up. She understands the importance of monthly self-breast exams and knows to notify me of any and all changes as they occu         Medical Decision Making:  It is my impression that this patient suffers all conditions contained in this medical document.  Each of these conditions did affect our plan of care and my medical decision making today.  It is my opinion that the medical decision making concerning this patient was of minimal  difficulty based on the aforementioned conditions.  Any further recommendations will be communicated to the patient by me.  I have reviewed and verified her allergies, list of medications, medical and surgical histories, social history, and a pertinent review of symptoms.     Follow up: 6 months and prn    For: FRANCESCO DOTSON) at

## 2024-04-16 ENCOUNTER — OFFICE VISIT (OUTPATIENT)
Dept: SURGERY | Facility: CLINIC | Age: 54
End: 2024-04-16
Payer: MEDICARE

## 2024-04-16 VITALS — HEIGHT: 62 IN | WEIGHT: 149.5 LBS | BODY MASS INDEX: 27.51 KG/M2

## 2024-04-16 DIAGNOSIS — N63.15 BREAST LUMP ON RIGHT SIDE AT 9 O'CLOCK POSITION: Primary | ICD-10-CM

## 2024-04-16 PROCEDURE — 99024 POSTOP FOLLOW-UP VISIT: CPT | Mod: S$GLB,,, | Performed by: NURSE PRACTITIONER

## 2024-04-16 PROCEDURE — 1160F RVW MEDS BY RX/DR IN RCRD: CPT | Mod: CPTII,S$GLB,, | Performed by: NURSE PRACTITIONER

## 2024-04-16 PROCEDURE — 4010F ACE/ARB THERAPY RXD/TAKEN: CPT | Mod: CPTII,S$GLB,, | Performed by: NURSE PRACTITIONER

## 2024-04-16 PROCEDURE — 1159F MED LIST DOCD IN RCRD: CPT | Mod: CPTII,S$GLB,, | Performed by: NURSE PRACTITIONER

## 2024-04-16 PROCEDURE — 99999 PR PBB SHADOW E&M-EST. PATIENT-LVL III: CPT | Mod: PBBFAC,,, | Performed by: NURSE PRACTITIONER

## 2024-04-23 ENCOUNTER — PATIENT MESSAGE (OUTPATIENT)
Dept: PSYCHIATRY | Facility: CLINIC | Age: 54
End: 2024-04-23
Payer: MEDICARE

## 2024-04-24 RX ORDER — BUPROPION HYDROCHLORIDE 300 MG/1
300 TABLET ORAL DAILY
Qty: 30 TABLET | Refills: 2 | Status: SHIPPED | OUTPATIENT
Start: 2024-04-24 | End: 2024-04-30

## 2024-04-26 ENCOUNTER — TELEPHONE (OUTPATIENT)
Dept: PSYCHIATRY | Facility: CLINIC | Age: 54
End: 2024-04-26
Payer: MEDICARE

## 2024-04-29 ENCOUNTER — OFFICE VISIT (OUTPATIENT)
Dept: PODIATRY | Facility: CLINIC | Age: 54
End: 2024-04-29
Payer: MEDICARE

## 2024-04-29 ENCOUNTER — HOSPITAL ENCOUNTER (OUTPATIENT)
Dept: RADIOLOGY | Facility: HOSPITAL | Age: 54
Discharge: HOME | End: 2024-04-29
Attending: PODIATRIST
Payer: MEDICARE

## 2024-04-29 VITALS — HEIGHT: 62 IN | BODY MASS INDEX: 27.51 KG/M2 | WEIGHT: 149.5 LBS

## 2024-04-29 DIAGNOSIS — M72.2 PLANTAR FASCIITIS: ICD-10-CM

## 2024-04-29 DIAGNOSIS — M79.672 LEFT FOOT PAIN: Primary | ICD-10-CM

## 2024-04-29 DIAGNOSIS — S93.602A FOOT SPRAIN, LEFT, INITIAL ENCOUNTER: ICD-10-CM

## 2024-04-29 DIAGNOSIS — M79.672 LEFT FOOT PAIN: ICD-10-CM

## 2024-04-29 PROCEDURE — 1159F MED LIST DOCD IN RCRD: CPT | Mod: CPTII,S$GLB,, | Performed by: PODIATRIST

## 2024-04-29 PROCEDURE — 73630 X-RAY EXAM OF FOOT: CPT | Mod: 26,LT,, | Performed by: RADIOLOGY

## 2024-04-29 PROCEDURE — 99999 PR PBB SHADOW E&M-EST. PATIENT-LVL III: CPT | Mod: PBBFAC,,, | Performed by: PODIATRIST

## 2024-04-29 PROCEDURE — 3008F BODY MASS INDEX DOCD: CPT | Mod: CPTII,S$GLB,, | Performed by: PODIATRIST

## 2024-04-29 PROCEDURE — 99204 OFFICE O/P NEW MOD 45 MIN: CPT | Mod: S$GLB,,, | Performed by: PODIATRIST

## 2024-04-29 PROCEDURE — 73630 X-RAY EXAM OF FOOT: CPT | Mod: TC,LT

## 2024-04-29 PROCEDURE — 4010F ACE/ARB THERAPY RXD/TAKEN: CPT | Mod: CPTII,S$GLB,, | Performed by: PODIATRIST

## 2024-04-29 RX ORDER — METHYLPREDNISOLONE 4 MG/1
TABLET ORAL
Qty: 21 TABLET | Refills: 0 | Status: SHIPPED | OUTPATIENT
Start: 2024-04-29

## 2024-04-29 NOTE — PROGRESS NOTES
Podiatry Department    Patient ID: Irasema Vang is a 53 y.o. female.    Chief Complaint: Foot Pain (C/o left foot pain when walking, pt states its started a couple of years ago, pt states she has walk on the tippy toes, pt rates pain 6/10, pt is non-diabetic,)    History of Present Illness    CHIEF COMPLAINT:  Patient presents today for left foot pain.    LEFT FOOT PAIN:  She started experiencing pain in left foot, rated 6-7 on the pain scale, primarily during walking about 2-3 weeks ago. The onset coincides with a fall in the bathroom. She notes a limp in her walk due to the pain. While she identified a possible foreign body beneath a toe joint, she clarified that this does not coincide with her primary area of pain. The pain sensation increases after periods of rest, such as waking up in the morning. No fractures were reported and is consistent with recent x-ray results.  ROS:  Musculoskeletal: +joint pain            Physical Exam    Musculoskeletal: Pain on plantar central aspect of the left heel. No pain along the arch. Palpable pedal pulses in left foot. No edema in left foot.           Diagnoses:  1. Left foot pain    2. Foot sprain, left, initial encounter    3. Plantar fasciitis    Other orders  -     methylPREDNISolone (MEDROL DOSEPACK) 4 mg tablet; use as directed  Dispense: 21 tablet; Refill: 0        Assessment & Plan    PLANTAR FASCIITIS/FOOT SPRAIN:  - Suspected plantar fasciitis or foot sprain as the cause of the patient's foot pain, possibly triggered by a recent fall.  - Prescribed a 10-day course of anti-inflammatories or a steroid to alleviate the foot pain.  - Ordered home exercises for rehabilitation, including stretching and icing the affected foot 3 times daily for 30 seconds each time, to continue until the end of May.  - Advised the patient to return for a follow-up if the foot pain persists until the end of May.  - Educated the patient about the possible causes of their foot pain,  including plantar fasciitis and foot sprain.  - Provided a package on plantar fasciitis, which includes instructions for home exercises for rehabilitation, to be continued until the end of May.         Future Appointments   Date Time Provider Department Center   7/30/2024  3:00 PM Jose Beltran MD [facility]   10/21/2024 11:30 AM Tess Kessler, KRISTIN [facility] BREAST Breast Surg        This note was generated with the assistance of ambient listening technology. Verbal consent was obtained by the patient and accompanying visitor(s) for the recording of patient appointment to facilitate this note. I attest to having reviewed and edited the generated note for accuracy, though some syntax or spelling errors may persist. Please contact the author of this note for any clarification.      Report Electronically Signed By:     Shanice Velazquez DPM   Podiatry  [facility]  4/30/2024

## 2024-04-30 ENCOUNTER — OFFICE VISIT (OUTPATIENT)
Dept: PSYCHIATRY | Facility: CLINIC | Age: 54
End: 2024-04-30
Payer: MEDICARE

## 2024-04-30 DIAGNOSIS — G47.00 INSOMNIA, UNSPECIFIED TYPE: ICD-10-CM

## 2024-04-30 DIAGNOSIS — F32.A CHRONIC DEPRESSION: Primary | ICD-10-CM

## 2024-04-30 PROCEDURE — 4010F ACE/ARB THERAPY RXD/TAKEN: CPT | Mod: CPTII,S$GLB,, | Performed by: PSYCHIATRY & NEUROLOGY

## 2024-04-30 PROCEDURE — 99214 OFFICE O/P EST MOD 30 MIN: CPT | Mod: S$GLB,,, | Performed by: PSYCHIATRY & NEUROLOGY

## 2024-04-30 RX ORDER — SERTRALINE HYDROCHLORIDE 100 MG/1
TABLET, FILM COATED ORAL
Qty: 30 TABLET | Refills: 3 | Status: SHIPPED | OUTPATIENT
Start: 2024-04-30 | End: 2024-06-10

## 2024-04-30 RX ORDER — ZOLPIDEM TARTRATE 5 MG/1
TABLET ORAL
Qty: 30 TABLET | Refills: 2 | Status: SHIPPED | OUTPATIENT
Start: 2024-04-30
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
## 2024-06-10 ENCOUNTER — PATIENT MESSAGE (OUTPATIENT)
Dept: PSYCHIATRY | Facility: CLINIC | Age: 54
End: 2024-06-10
Payer: MEDICARE

## 2024-06-20 DIAGNOSIS — I10 ESSENTIAL HYPERTENSION: ICD-10-CM

## 2024-06-21 NOTE — TELEPHONE ENCOUNTER
Refill Routing Note   Medication(s) are not appropriate for processing by [facility] for the following reason(s):      No PCP listed in Epic: routing to   as last prescribing provider  Requirement: Established primary provider participating in [facility] program    [facility] action(s):  Route None identified     Medication Therapy Plan: pcp listed in EPIC chart not in [facility]      Appointments  past 12m or future 3m with PCP    Date Provider   Last Visit   2/20/2024 Kaya Pal MD   Next Visit   Visit date not found Kaya Pal MD   ED visits in past 90 days: 0        Note composed:7:04 PM 06/20/2024

## 2024-06-24 RX ORDER — SPIRONOLACTONE 50 MG/1
50 TABLET, FILM COATED ORAL DAILY
Qty: 90 TABLET | Refills: 1 | Status: SHIPPED | OUTPATIENT
Start: 2024-06-24 | End: 2024-12-21

## 2024-07-05 DIAGNOSIS — I10 ESSENTIAL HYPERTENSION: ICD-10-CM

## 2024-07-08 ENCOUNTER — PATIENT MESSAGE (OUTPATIENT)
Dept: GASTROENTEROLOGY | Facility: CLINIC | Age: 54
End: 2024-07-08
Payer: MEDICARE

## 2024-07-08 ENCOUNTER — PATIENT MESSAGE (OUTPATIENT)
Dept: PRIMARY CARE CLINIC | Facility: CLINIC | Age: 54
End: 2024-07-08
Payer: MEDICARE

## 2024-07-08 RX ORDER — LOSARTAN POTASSIUM 50 MG/1
50 TABLET ORAL DAILY
Qty: 90 TABLET | Refills: 3 | Status: SHIPPED | OUTPATIENT
Start: 2024-07-08 | End: 2025-07-08

## 2024-07-09 ENCOUNTER — OFFICE VISIT (OUTPATIENT)
Dept: PRIMARY CARE CLINIC | Facility: CLINIC | Age: 54
End: 2024-07-09
Payer: MEDICARE

## 2024-07-09 ENCOUNTER — TELEPHONE (OUTPATIENT)
Dept: PRIMARY CARE CLINIC | Facility: CLINIC | Age: 54
End: 2024-07-09
Payer: MEDICARE

## 2024-07-09 DIAGNOSIS — Z86.39 HISTORY OF METABOLIC DISORDER: ICD-10-CM

## 2024-07-09 DIAGNOSIS — R63.5 WEIGHT GAIN: Primary | ICD-10-CM

## 2024-07-09 PROCEDURE — 1160F RVW MEDS BY RX/DR IN RCRD: CPT | Mod: CPTII,95,, | Performed by: NURSE PRACTITIONER

## 2024-07-09 PROCEDURE — 4010F ACE/ARB THERAPY RXD/TAKEN: CPT | Mod: CPTII,95,, | Performed by: NURSE PRACTITIONER

## 2024-07-09 PROCEDURE — 99213 OFFICE O/P EST LOW 20 MIN: CPT | Mod: 95,,, | Performed by: NURSE PRACTITIONER

## 2024-07-09 PROCEDURE — 1159F MED LIST DOCD IN RCRD: CPT | Mod: CPTII,95,, | Performed by: NURSE PRACTITIONER

## 2024-07-09 NOTE — TELEPHONE ENCOUNTER
Notified pt of labs, pt voiced she will complete them at the [place]. Pt voiced understanding.

## 2024-07-12 ENCOUNTER — TELEPHONE (OUTPATIENT)
Dept: PRIMARY CARE CLINIC | Facility: CLINIC | Age: 54
End: 2024-07-12
Payer: MEDICARE

## 2024-07-12 ENCOUNTER — PATIENT MESSAGE (OUTPATIENT)
Dept: PRIMARY CARE CLINIC | Facility: CLINIC | Age: 54
End: 2024-07-12
Payer: MEDICARE

## 2024-07-12 NOTE — TELEPHONE ENCOUNTER
Returned call to Nurse Pitcher from [facility] to inform a signed consent is needed for request for last visit notes. No answer.       ----- Message from Ashley Linton sent at 7/12/2024 10:09 AM CDT -----  Contact: [phone]  [facility] Is requesting pt last visit notes including meds that were taken when she was last saw in specific (ozempic)    Fax : [phone]  Call:[phone](nurse pitcher)    Please call pt and cesar

## 2024-07-15 ENCOUNTER — TELEPHONE (OUTPATIENT)
Dept: PRIMARY CARE CLINIC | Facility: CLINIC | Age: 54
End: 2024-07-15
Payer: MEDICARE

## 2024-07-15 NOTE — TELEPHONE ENCOUNTER
PT WAS ADVISED TO FOLLOW UP WITH MEDICAL RECORDS REQUEST LINE, PT VOICED UNDERSTANDING.     ----- Message from Ashley Linton sent at 7/15/2024  2:15 PM CDT -----  Contact: [phone]  Pt is calling about coming  in to sign consent form [facility]    Please call pt and advise

## 2024-07-22 NOTE — PROGRESS NOTES
The patient location is: home  The chief complaint leading to consultation is: concerns about weight gain    Visit type: audiovisual    Face to Face time with patient: 10  20 minutes of total time spent on the encounter, which includes face to face time and non-face to face time preparing to see the patient (eg, review of tests), Obtaining and/or reviewing separately obtained history, Documenting clinical information in the electronic or other health record, Independently interpreting results (not separately reported) and communicating results to the patient/family/caregiver, or Care coordination (not separately reported).         Each patient to whom he or she provides medical services by telemedicine is:  (1) informed of the relationship between the physician and patient and the respective role of any other health care provider with respect to management of the patient; and (2) notified that he or she may decline to receive medical services by telemedicine and may withdraw from such care at any time.    Notes: 53 year old female pt with a history of HTN, IBS, and depression presents on virtual visit with concerns of weight gain and difficulty losing weight.  The patient has gained 7 lbs in 9 months. She states she eats healthy occasinally and does work out. She admits that she was has been down for a few weeks due to recent infection and surgery of foot. Only other complaints is constipation that she is currently taking linzess for. She does not have any other issues at this time.   Answers submitted by the patient for this visit:  Review of Systems Questionnaire (Submitted on 7/8/2024)  activity change: Yes  unexpected weight change: Yes  neck pain: No  hearing loss: No  rhinorrhea: No  trouble swallowing: No  eye discharge: No  visual disturbance: No  chest tightness: No  wheezing: No  chest pain: No  palpitations: No  blood in stool: No  constipation: Yes  vomiting: No  diarrhea: No  polydipsia: Yes  polyuria:  No  difficulty urinating: No  hematuria: No  menstrual problem: No  dysuria: No  joint swelling: No  arthralgias: No  headaches: No  weakness: No  confusion: No  dysphoric mood: No    Labs Insulin random Hemoglobin A1c and tsh have been ordered. Pt also understands to continue dieting and exercising

## 2024-07-26 ENCOUNTER — TELEPHONE (OUTPATIENT)
Dept: PSYCHIATRY | Facility: CLINIC | Age: 54
End: 2024-07-26
Payer: MEDICARE

## 2024-07-30 ENCOUNTER — OFFICE VISIT (OUTPATIENT)
Dept: PSYCHIATRY | Facility: CLINIC | Age: 54
End: 2024-07-30
Payer: MEDICARE

## 2024-07-30 DIAGNOSIS — F41.9 ANXIETY: ICD-10-CM

## 2024-07-30 DIAGNOSIS — F32.A CHRONIC DEPRESSION: Primary | ICD-10-CM

## 2024-07-30 DIAGNOSIS — G47.00 INSOMNIA, UNSPECIFIED TYPE: ICD-10-CM

## 2024-07-30 PROCEDURE — 4010F ACE/ARB THERAPY RXD/TAKEN: CPT | Mod: CPTII,95,, | Performed by: PSYCHIATRY & NEUROLOGY

## 2024-07-30 PROCEDURE — 99213 OFFICE O/P EST LOW 20 MIN: CPT | Mod: 95,,, | Performed by: PSYCHIATRY & NEUROLOGY

## 2024-07-30 RX ORDER — ZOLPIDEM TARTRATE 5 MG/1
TABLET ORAL
Qty: 30 TABLET | Refills: 3 | Status: SHIPPED | OUTPATIENT
Start: 2024-07-30
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
## 2024-10-13 DIAGNOSIS — N63.15 BREAST LUMP ON RIGHT SIDE AT 9 O'CLOCK POSITION: Primary | ICD-10-CM

## 2024-10-21 ENCOUNTER — OFFICE VISIT (OUTPATIENT)
Dept: SURGERY | Facility: CLINIC | Age: 54
End: 2024-10-21
Payer: OTHER GOVERNMENT

## 2024-10-21 DIAGNOSIS — N63.15 BREAST LUMP ON RIGHT SIDE AT 9 O'CLOCK POSITION: Primary | ICD-10-CM

## 2024-10-21 DIAGNOSIS — R59.0 AXILLARY LYMPHADENOPATHY: ICD-10-CM

## 2024-10-21 DIAGNOSIS — N63.25 BREAST LUMP ON LEFT SIDE AT 6 O'CLOCK POSITION: ICD-10-CM

## 2024-10-21 PROCEDURE — 99213 OFFICE O/P EST LOW 20 MIN: CPT | Mod: PBBFAC,PN | Performed by: NURSE PRACTITIONER

## 2024-10-21 PROCEDURE — 99213 OFFICE O/P EST LOW 20 MIN: CPT | Mod: S$PBB,,, | Performed by: NURSE PRACTITIONER

## 2024-10-21 PROCEDURE — 99999 PR PBB SHADOW E&M-EST. PATIENT-LVL III: CPT | Mod: PBBFAC,,, | Performed by: NURSE PRACTITIONER

## 2024-10-21 NOTE — PROGRESS NOTES
[facility]  MD Tess Joel, NP-C    Date of Service: 10/21/2024      Chief Complaint:   Irasema Vang is a 53 y.o. female presenting today for  6 month follow up. She is due for Physical Examination and Mammogram  She reports no interval changes on her self-breast examination.     History of Present Illness:   Irasema Vang is a 52 y.o. female who presented on November 7, 2023 with a left breast mass that enhanced on MRI. MRI guided core biopsy showed benign changes with nothing atypical or cancerous.  [facility]. We reviewed her imaging and reports and felt that these could be followed conservatively. In March 2024 she was noted with an ill-defined right breast mass. Sonocore biopsy was performed and discordant. Excisional biopsy revealed Focal papillomatosis,usual duct epithelial hyperplasia, cysts, dilated ducts, adenosis and stromal fibrosis. Calcifications noted in non-neoplastic tissue. Sequelae of previous biopsy noted. NEM noted.  MD:::Kaya Pal MD     Past Medical History:   Diagnosis Date    Abnormal Pap smear     history of uterine cancer    Abnormal Pap smear of cervix     Asthma     no meds since age 20    Axillary lymphadenopathy; right 10/21/2024    Breast lump on left side at 6 o'clock position 11/06/2023    Breast lump on right side at 9 o'clock position 03/20/2024    Constipation, chronic     Depression     Family history of nephrolithiasis     General anesthetics causing adverse effect in therapeutic use     slow to wake up    History of cervical dysplasia 06/27/2013    Hypertension     LAD (lymphadenopathy) 11/07/2023    Menopausal syndrome (hot flashes) 06/27/2013    Nephrolithiasis 06/27/2013    PONV (postoperative nausea and vomiting)     PTSD (post-traumatic stress disorder)     Renal cell carcinoma     Renal cell carcinoma 08/24/2017    Seizures     1 yr ago- caused by  migraine meds--no treatment now    Urinoma     Uterine cancer 2000      Past Surgical History:   Procedure Laterality Date    COLONOSCOPY  05/17/2013    non-bleeding AVMs    COLONOSCOPY N/A 01/04/2023    Procedure: COLONOSCOPY;  Surgeon: Dalila Hernandez MD;  Location: [facility];  Service: Endoscopy;  Laterality: N/A;    COLONOSCOPY W/ POLYPECTOMY      cystoscopy with stent placement      ESOPHAGOGASTRODUODENOSCOPY  05/17/2013    HYSTERECTOMY      LAPAROSCOPIC LYSIS OF ADHESIONS N/A 09/04/2018    Procedure: LYSIS, ADHESIONS, LAPAROSCOPIC;  Surgeon: Darby Monroy MD;  Location: [facility] OR;  Service: OB/GYN;  Laterality: N/A;    LAPAROSCOPIC OOPHORECTOMY Left 09/04/2018    Procedure: OOPHORECTOMY, LAPAROSCOPIC;  Surgeon: Darby Monroy MD;  Location: [facility] OR;  Service: OB/GYN;  Laterality: Left;    LAPAROSCOPIC SURGICAL REMOVAL OF CYST OF OVARY Right 09/04/2018    Procedure: EXCISION, CYST, OVARY, LAPAROSCOPIC;  Surgeon: Darby Monroy MD;  Location: [facility];  Service: OB/GYN;  Laterality: Right;    Laparoscopy  09/05/2017    OOPHORECTOMY      1 ovary removed    right breast biopsy after needle localization 4/2/2024      right breast sonocore biopsy3/2024      SURGICAL REMOVAL OF LYMPH NODE Left 07/13/2023    URETEROSCOPY          Current Outpatient Medications:     betamethasone dipropionate 0.05 % cream, Apply topically 2 (two) times daily., Disp: , Rfl:     clobetasoL (TEMOVATE) 0.05 % external solution, Apply topically 2 (two) times daily., Disp: , Rfl:     EPINEPHrine (EPIPEN) 0.3 mg/0.3 mL AtIn, Inject into the muscle as needed., Disp: 2 each, Rfl: 6    estradioL (ESTRACE) 0.01 % (0.1 mg/gram) vaginal cream, Place 1 g vaginally once daily., Disp: , Rfl:     fluticasone propionate (FLONASE) 50 mcg/actuation nasal spray, 2 sprays (100 mcg total) by Each Nostril route once daily., Disp: 16 g, Rfl: 0    ibuprofen (ADVIL,MOTRIN) 800 MG tablet, Take 1 tablet (800 mg total) by mouth every 6  (six) hours as needed for Pain., Disp: 30 tablet, Rfl: 1    ketoconazole (NIZORAL) 2 % cream, Apply topically 2 (two) times daily., Disp: , Rfl:     linaclotide (LINZESS ORAL), Take by mouth., Disp: , Rfl:     losartan (COZAAR) 50 MG tablet, Take 1 tablet (50 mg total) by mouth once daily., Disp: 90 tablet, Rfl: 3    methylPREDNISolone (MEDROL DOSEPACK) 4 mg tablet, use as directed, Disp: 21 tablet, Rfl: 0    spironolactone (ALDACTONE) 50 MG tablet, Take 1 tablet (50 mg total) by mouth once daily., Disp: 90 tablet, Rfl: 1    triamcinolone acetonide 0.1% (KENALOG) 0.1 % cream, Apply topically 2 (two) times daily. for 14 days, Disp: 80 g, Rfl: 2    vortioxetine (TRINTELLIX) 20 mg Tab, Take 2 tablets by mouth daily., Disp: 180 tablet, Rfl: 1    zolpidem (AMBIEN) 5 MG Tab, Take 1/2 to 1 tablet at bedtime as needed for sleep., Disp: 30 tablet, Rfl: 3  No current facility-administered medications for this visit.    Facility-Administered Medications Ordered in Other Visits:     nozaseptin (NOZIN) nasal , , Each Nostril, On Call Procedure, Darby Monroy MD, Given at 09/04/18 1019    phenazopyridine tablet 200 mg, 200 mg, Oral, On Call Procedure, Darby Monroy MD, 200 mg at 09/04/18 1108   Review of patient's allergies indicates:   Allergen Reactions    Amlodipine Edema and Swelling    Bee venom protein (honey bee) Anaphylaxis    Lisinopril Swelling and Other (See Comments)     cough  Other reaction(s): Cough    Shellfish containing products Rash and Hives    Latex, natural rubber Hives    Nitrofurantoin monohyd/m-cryst      urticaria    Peanut Hives    Codeine Itching and Anxiety    Gloves, latex with aloe vera Rash    Mirabegron Rash      Social History     Tobacco Use    Smoking status: Never     Passive exposure: Past    Smokeless tobacco: Never   Substance Use Topics    Alcohol use: Not Currently     Comment: occassionally       Family History   Problem Relation Name Age of Onset     Hypertension Mother      Cancer Father      Diabetes Brother      Breast cancer Maternal Grandmother      Breast cancer Paternal Aunt      Ovarian cancer Paternal Aunt      Breast cancer Paternal Aunt      Breast cancer Paternal Aunt      Colon cancer Neg Hx          Review of Systems   Integumentary:  Negative for color change, rash, mole/lesion, breast mass, breast discharge and breast tenderness.   Breast: Negative for mass and tenderness       Physical Exam   HENT:   Head: Normocephalic.   Pulmonary/Chest: Right breast exhibits no inverted nipple, no mass, no nipple discharge, no skin change and no tenderness. Left breast exhibits no inverted nipple, no mass, no nipple discharge, no skin change and no tenderness. No breast swelling.   Genitourinary: No breast swelling.   Musculoskeletal: Lymphadenopathy:      Upper Body:      Right upper body: No supraclavicular or axillary adenopathy.      Left upper body: No supraclavicular or axillary adenopathy.     Neurological: She is alert.        MAMMOGRAM REPORT: She has some diffuse fibronodular tissue; there are no spiculated lesions, distortions or suspicious calcifications noted; NEM; increased right axillary LN. Negative Left.     Ultrasound: Right- increased right axillary LN. Questionable reactive.     NOTE:::We viewed her films together at today's visit.  We discussed the multiple views obtained and the important findings.  Even benign changes were mentioned and her questions were answered.  She knows that she may receive a formal letter or report from the Radiologist.  She is to contact us if she has questions.      Assessment/Plan  1. Breast lump on right side at 9 o'clock position  Assessment & Plan:  We reviewed our findings today and her questions were answered.  She understands that her imaging and exams have remained stable (and show nothing concerning).  She is comfortable being followed in a conservative fashion.      She understands the importance of  monthly self-breast examination and knows to report any and all changes as they occur.    NOTE:::We viewed her films together at today's visit.  We discussed the multiple views obtained and the important findings.  Even benign changes were mentioned and her questions were answered.  She is to contact us if she has questions.        2. Breast lump on left side at 6 o'clock position  Assessment & Plan:  Same as above      3. Axillary lymphadenopathy; right  Assessment & Plan:  We will re-image her in 3 months as recommended.              Medical Decision Making:  It is my impression that this patient suffers all conditions contained in this medical document.  Each of these conditions did affect our plan of care and my medical decision making today.  It is my opinion that the medical decision making concerning this patient was of moderate difficulty based on the aforementioned conditions.  Any further recommendations will be communicated to the patient by me.  I have reviewed and verified her allergies, list of medications, medical and surgical histories, social history, and a pertinent review of symptoms.      Follow up:  3 months and prn    For:  Physical Examination and US (D) yvette hinojosa right at         Addendum 10/21/2024 1718: Mammogram and ultrasound: FINDINGS: The patient has had bilateral breast biopsies. The breast tissue is heterogeneously dense, which lowers the sensitivity of mammography. History of interval benign right breast excisional biopsy since previous mammogram. No new suspicious mass lesions or microcalcifications on either side. Scattered coarse benign-appearing breast calcifications are stable.Slightly more prominent deep right axillary adenopathy demonstrated on the MLO view. Complete right breast ultrasound performed along with careful scanning over the  right axilla. Multiple mildly prominent right axillary lymph nodes are noted   with slightly effaced fatty mert and relatively symmetric  cortical thickening up to 6 mm. No suspicious mass lesion or abnormal areas of acoustical shadowing demonstrated within the breast itself. Suspected benign ductal ectasia in the surgical site at the right lateral periareolar region, minimal low-level internal echoes noted within a few of the ducts at the 9 and 10 o'clock periareolar region, no   intrinsic blood flow in these areas with color Doppler. IMPRESSION: Interval development of mildly prominent right axillary adenopathy, possible post biopsy reactive nodes. Benign-appearing ductal ectasia at the right 9-10 o'clock periareolar region containing suspected internal debris. Recommend short term follow-up right breast ultrasound in 3-4 months to monitor stability. No suspicious findings on the left. BIRADS 3: Probably Benign

## 2024-11-06 ENCOUNTER — TELEPHONE (OUTPATIENT)
Dept: PRIMARY CARE CLINIC | Facility: CLINIC | Age: 54
End: 2024-11-06
Payer: OTHER GOVERNMENT

## 2024-11-06 NOTE — TELEPHONE ENCOUNTER
Called to inform pt that there are no appointments in clinic at this time. Pt advised to report to any [facility] or the nearest Urgent care to be seen and evaluated in regards to uti symptoms. Pt voiced understanding.

## 2024-11-19 ENCOUNTER — PATIENT OUTREACH (OUTPATIENT)
Dept: ADMINISTRATIVE | Facility: HOSPITAL | Age: 54
End: 2024-11-19
Payer: OTHER GOVERNMENT

## 2024-11-19 NOTE — LETTER
November 19, 2024    Irasema Vang  [id number] [facility] [address] [place] [id number]             Dear Irasema    In addition to helping you feel better when you are sick, we are interested in preventing illness and injury in the first place.  Screening tests and routine follow up tests when you have certain medical problems are very essential in helping you stay as healthy as possible. In the spirit of maintaining your health, our system indicates that you are due for the following:    Health Maintenance Due   Topic Date Due    Shingles Vaccine (1 of 2) Never done    Pneumococcal Vaccines (Age 0-64) (2 of 2 - PPSV23 or PCV20) 02/15/2019    Hemoglobin A1c (Prediabetes)  11/01/2023    Influenza Vaccine (1) 09/01/2024    COVID-19 Vaccine (4 - 2024-25 season) 09/01/2024        Please be prepared to discuss these recommended tests at your next visit.  If you haven't had a visit within the past one year please call [phone] to schedule or request an appointment.    Sincerely,       Your Health Care Team

## 2024-11-19 NOTE — PROGRESS NOTES
Health Maintenance Due   Topic Date Due    Shingles Vaccine (1 of 2) Never done    Pneumococcal Vaccines (Age 0-64) (2 of 2 - PPSV23 or PCV20) 02/15/2019    Hemoglobin A1c (Prediabetes)  11/01/2023    Influenza Vaccine (1) 09/01/2024    COVID-19 Vaccine (4 - 2024-25 season) 09/01/2024    LVM to schedule,lab, mailed reminder.

## 2024-12-30 DIAGNOSIS — I10 ESSENTIAL HYPERTENSION: ICD-10-CM

## 2024-12-30 NOTE — TELEPHONE ENCOUNTER
Care Due:                  Date            Visit Type   Department     Provider  --------------------------------------------------------------------------------                                ESTABLISHED   [facility] PRIMARY  Last Visit: 02-      PATIENT      CARE           Kaya Pal  Next Visit: None Scheduled  None         None Found                                                            Last  Test          Frequency    Reason                     Performed    Due Date  --------------------------------------------------------------------------------    CMP.........  12 months..  losartan, spironolactone.  10-   10-    [facility] Embedded Care Due Messages. Reference number: [id number].   12/30/2024 9:21:23 AM CST

## 2025-01-05 ENCOUNTER — OFFICE VISIT (OUTPATIENT)
Dept: URGENT CARE | Facility: CLINIC | Age: 55
End: 2025-01-05
Payer: OTHER GOVERNMENT

## 2025-01-05 VITALS
BODY MASS INDEX: 26.19 KG/M2 | RESPIRATION RATE: 16 BRPM | TEMPERATURE: 99 F | SYSTOLIC BLOOD PRESSURE: 114 MMHG | HEART RATE: 82 BPM | WEIGHT: 142.31 LBS | DIASTOLIC BLOOD PRESSURE: 75 MMHG | HEIGHT: 62 IN | OXYGEN SATURATION: 100 %

## 2025-01-05 DIAGNOSIS — R22.2 MASS OF SKIN OF CHEST: ICD-10-CM

## 2025-01-05 DIAGNOSIS — R35.0 URINARY FREQUENCY: Primary | ICD-10-CM

## 2025-01-05 DIAGNOSIS — N30.01 ACUTE CYSTITIS WITH HEMATURIA: ICD-10-CM

## 2025-01-05 DIAGNOSIS — M54.50 ACUTE BILATERAL LOW BACK PAIN WITHOUT SCIATICA: ICD-10-CM

## 2025-01-05 LAB
BILIRUBIN, UA POC OHS: NEGATIVE
BLOOD, UA POC OHS: ABNORMAL
CLARITY, UA POC OHS: ABNORMAL
COLOR, UA POC OHS: YELLOW
GLUCOSE, UA POC OHS: NEGATIVE
KETONES, UA POC OHS: 15
LEUKOCYTES, UA POC OHS: ABNORMAL
NITRITE, UA POC OHS: POSITIVE
PH, UA POC OHS: 6
PROTEIN, UA POC OHS: 100
SPECIFIC GRAVITY, UA POC OHS: >=1.03
UROBILINOGEN, UA POC OHS: 0.2

## 2025-01-05 PROCEDURE — 87088 URINE BACTERIA CULTURE: CPT | Performed by: PHYSICIAN ASSISTANT

## 2025-01-05 PROCEDURE — 99214 OFFICE O/P EST MOD 30 MIN: CPT | Mod: S$GLB,,, | Performed by: PHYSICIAN ASSISTANT

## 2025-01-05 PROCEDURE — 87086 URINE CULTURE/COLONY COUNT: CPT | Performed by: PHYSICIAN ASSISTANT

## 2025-01-05 PROCEDURE — 81003 URINALYSIS AUTO W/O SCOPE: CPT | Mod: QW,S$GLB,, | Performed by: PHYSICIAN ASSISTANT

## 2025-01-05 PROCEDURE — 87186 SC STD MICRODIL/AGAR DIL: CPT | Performed by: PHYSICIAN ASSISTANT

## 2025-01-05 RX ORDER — CEPHALEXIN 500 MG/1
500 CAPSULE ORAL EVERY 12 HOURS
Qty: 14 CAPSULE | Refills: 0 | Status: SHIPPED | OUTPATIENT
Start: 2025-01-05 | End: 2025-01-12

## 2025-01-05 RX ORDER — MUPIROCIN 20 MG/G
OINTMENT TOPICAL 2 TIMES DAILY
Qty: 15 G | Refills: 0 | Status: SHIPPED | OUTPATIENT
Start: 2025-01-05

## 2025-01-05 RX ORDER — ALBUTEROL SULFATE 90 UG/1
2 INHALANT RESPIRATORY (INHALATION) EVERY 4 HOURS PRN
COMMUNITY

## 2025-01-05 RX ORDER — SEMAGLUTIDE 2.68 MG/ML
INJECTION, SOLUTION SUBCUTANEOUS
COMMUNITY
End: 2025-01-05

## 2025-01-05 RX ORDER — BUPROPION HYDROCHLORIDE 300 MG/1
300 TABLET ORAL
COMMUNITY
Start: 2024-10-17

## 2025-01-05 NOTE — PROGRESS NOTES
"Subjective:      Patient ID: Irasema Vang is a 54 y.o. female.    Vitals:  height is 5' 2" (1.575 m) and weight is 64.5 kg (142 lb 4.9 oz). Her tympanic temperature is 98.7 °F (37.1 °C). Her blood pressure is 114/75 and her pulse is 82. Her respiration is 16 and oxygen saturation is 100%.     Chief Complaint: Breast Mass    Patient presents today with a knot under right breast and lower back pain. Patient states that the knot under her breast was not painful until she began to mess with it. Patient originally thought it was blackhead about 1 week ago so she started to squeeze/drain it. Denies any drainage.  Since then feels area has gotten slightly bigger.  Reports mild discomfort with palpation but otherwise no significant pain.  Denies erythema, fever, breast pain.  She does have history of breast masses bilaterally and axillary lymphadenopathy before.  She has had workup of this, most recent imaging was in October and was recommended to have repeat imaging and follow-up in 3 months.      Back pain started yesterday. No injury preceding event but was putting kathryn decorations. Pain seems worse with movement and is more on left side > right side. Does not radiate. Hx of renal carcinoma, UTI, and kidney stones. + urinary frequency, but states sometimes this is normal for her. Says she was treated for UTI on 11/13/24 with Ciprofloxacin. Denies dysuria, hematuria, focal weakness, n/v, n/t. OTC - Excedrin and tylenol with little relief.     Mass  This is a new problem. The current episode started 1 to 4 weeks ago. The problem occurs constantly. The problem has been gradually worsening. Associated symptoms include myalgias. Pertinent negatives include no abdominal pain, chills, diaphoresis, fatigue, fever, headaches, nausea, numbness, rash, vomiting or weakness. Nothing aggravates the symptoms. She has tried ice for the symptoms. The treatment provided no relief.       Constitution: Negative for chills, " sweating, fatigue and fever.   HENT: Negative.     Cardiovascular: Negative.    Respiratory: Negative.     Gastrointestinal:  Negative for abdominal pain, nausea and vomiting.   Genitourinary:  Positive for frequency and history of kidney stones. Negative for dysuria, bladder incontinence and hematuria.   Musculoskeletal:  Positive for back pain and muscle ache. Negative for trauma and history of spine disorder.   Skin:  Negative for rash and erythema.        + mass under right breast   Neurological:  Negative for headaches and numbness.      Objective:     Physical Exam   Constitutional: She appears well-developed.  Non-toxic appearance. She does not appear ill. No distress.   HENT:   Head: Normocephalic and atraumatic.   Ears:   Right Ear: External ear normal.   Left Ear: External ear normal.   Nose: Nose normal.   Eyes: Conjunctivae and EOM are normal.   Neck: Neck supple.   Pulmonary/Chest: Effort normal and breath sounds normal. She exhibits mass (firm 1 cm subcutaneous mass under right breast. Pinpoint scabbed puncture centrally. No flutuance, erythema, drainage or excessive warmth.). Right breast exhibits no nipple discharge, no skin change and no tenderness. No breast swelling.       Abdominal: Normal appearance. She exhibits no distension. There is no guarding, no left CVA tenderness and no right CVA tenderness.   Genitourinary: No breast swelling.   Musculoskeletal: Normal range of motion.         General: Normal range of motion.      Lumbar back: She exhibits normal range of motion, no tenderness, no bony tenderness and no deformity.      Comments: Full strength in BLE.   Neurological: no focal deficit. She is alert. She displays no weakness. Gait normal.   Skin: Skin is warm, dry, not diaphoretic, not pale and no rash. No erythema   Psychiatric: Her behavior is normal.     Results for orders placed or performed in visit on 01/05/25   POCT Urinalysis(Instrument)    Collection Time: 01/05/25 12:34 PM    Result Value Ref Range    Color, POC UA Yellow Yellow, Straw, Colorless    Clarity, POC UA Cloudy (A) Clear    Glucose, POC UA Negative Negative    Bilirubin, POC UA Negative Negative    Ketones, POC UA 15 (A) Negative    Spec Grav POC UA >=1.030 1.005 - 1.030    Blood, POC UA Large (A) Negative    pH, POC UA 6.0 5.0 - 8.0    Protein, POC  (A) Negative    Urobilinogen, POC UA 0.2 <=1.0    Nitrite, POC UA Positive (A) Negative    WBC, POC UA Large (A) Negative       Assessment:     1. Urinary frequency    2. Acute bilateral low back pain without sciatica    3. Acute cystitis with hematuria    4. Mass of skin of chest        Plan:       Urinary frequency  -     POCT Urinalysis(Instrument)    Acute bilateral low back pain without sciatica  -     POCT Urinalysis(Instrument)    Acute cystitis with hematuria  -     Urine Culture High Risk    Mass of skin of chest  -     cephALEXin (KEFLEX) 500 MG capsule; Take 1 capsule (500 mg total) by mouth every 12 (twelve) hours. for 7 days  Dispense: 14 capsule; Refill: 0  -     mupirocin (BACTROBAN) 2 % ointment; Apply topically 2 (two) times daily.  Dispense: 15 g; Refill: 0          Medical Decision Making:   History:   Old Medical Records: I decided to obtain old medical records.  Old Records Summarized: records from clinic visits.       <> Summary of Records: Recent UA, urine culture and CMP reviewed.   Differential Diagnosis:   Mass: sebaceous cyst, fibrous tissue, lymphadenopathy, lipoma, carcinoma  Back pain: UTI, pyelonephritis, renal stone, kidney failure, muscle spasm    Clinical Tests:   Lab Tests: Ordered and Reviewed  Urgent Care Management:  VSS. No acute distress. Pt allergic to Macrobid and states that she typically does not tolerate Bactrim well.  Will prescribe Keflex and send urine culture.  Patient will be contacted if antibiotics need to be changed.  Advised to drink plenty of fluids.  She is prescribed ibuprofen 800 mg that she can take as needed for  pain.  Can alternate with Tylenol as needed.  Discussed using heat or topical analgesics to back if needed.  Discussed differential diagnosis subcutaneous mass.  Does not appear to be infected at this time, do not recommend I&D today.  There is small scabbed puncture were patient tried to drain mass previously so advised to apply Bactroban twice daily.  Given her history with lymphadenopathy and several breast masses, [facility] given that she is due for repeat imaging.  This will be helpful in dx mass.

## 2025-01-08 LAB — BACTERIA UR CULT: ABNORMAL

## 2025-03-06 ENCOUNTER — PATIENT MESSAGE (OUTPATIENT)
Dept: ADMINISTRATIVE | Facility: HOSPITAL | Age: 55
End: 2025-03-06
Payer: OTHER GOVERNMENT

## 2025-03-17 ENCOUNTER — PATIENT MESSAGE (OUTPATIENT)
Dept: ADMINISTRATIVE | Facility: HOSPITAL | Age: 55
End: 2025-03-17
Payer: OTHER GOVERNMENT

## 2025-03-19 ENCOUNTER — TELEPHONE (OUTPATIENT)
Dept: PRIMARY CARE CLINIC | Facility: CLINIC | Age: 55
End: 2025-03-19
Payer: OTHER GOVERNMENT

## 2025-03-19 NOTE — TELEPHONE ENCOUNTER
Called to schedule appt due to last appt. Scheduled appt 5/5/25 at 8:40 am she voiced understanding.

## 2025-03-23 NOTE — PROGRESS NOTES
US shows the presence of lymph nodes without any sign of cancer    I recommend completing the recommended antibiotic and follow up if it does not resolve. minutes on the discharge service.

## 2025-04-06 RX ORDER — SPIRONOLACTONE 50 MG/1
50 TABLET, FILM COATED ORAL DAILY
Qty: 90 TABLET | Refills: 1 | OUTPATIENT
Start: 2025-04-06 | End: 2025-10-03

## 2025-04-06 RX ORDER — LOSARTAN POTASSIUM 50 MG/1
50 TABLET ORAL DAILY
Qty: 90 TABLET | Refills: 3 | OUTPATIENT
Start: 2025-04-06 | End: 2026-04-06

## 2025-04-07 ENCOUNTER — PATIENT MESSAGE (OUTPATIENT)
Dept: ADMINISTRATIVE | Facility: HOSPITAL | Age: 55
End: 2025-04-07
Payer: OTHER GOVERNMENT

## 2025-05-05 ENCOUNTER — PATIENT MESSAGE (OUTPATIENT)
Dept: ADMINISTRATIVE | Facility: HOSPITAL | Age: 55
End: 2025-05-05
Payer: OTHER GOVERNMENT

## 2025-05-26 DIAGNOSIS — Z00.00 ENCOUNTER FOR MEDICARE ANNUAL WELLNESS EXAM: ICD-10-CM

## 2025-06-02 ENCOUNTER — PATIENT MESSAGE (OUTPATIENT)
Dept: ADMINISTRATIVE | Facility: HOSPITAL | Age: 55
End: 2025-06-02
Payer: OTHER GOVERNMENT

## 2025-06-12 ENCOUNTER — TELEPHONE (OUTPATIENT)
Dept: PSYCHIATRY | Facility: CLINIC | Age: 55
End: 2025-06-12
Payer: OTHER GOVERNMENT

## 2025-06-12 NOTE — TELEPHONE ENCOUNTER
Medication is has been resolved.      Copied from CRM #[id number]. Topic: Medications - Medication Question  >> Jun 12, 2025  2:59 PM Yvette wrote:  Type:  Needs Medical Advice    Who Called: Irasema Vang   She wants to know the name and the dose of a medication that  gave it to her last year  Would the patient rather a call back or a response via [facility]? Call back  Best Call Back Number: [phone]  Thanks!

## 2025-06-27 ENCOUNTER — PATIENT MESSAGE (OUTPATIENT)
Dept: ADMINISTRATIVE | Facility: CLINIC | Age: 55
End: 2025-06-27
Payer: OTHER GOVERNMENT

## 2025-06-30 ENCOUNTER — OFFICE VISIT (OUTPATIENT)
Dept: PRIMARY CARE CLINIC | Facility: CLINIC | Age: 55
End: 2025-06-30
Payer: MEDICARE

## 2025-06-30 ENCOUNTER — LAB VISIT (OUTPATIENT)
Dept: LAB | Facility: HOSPITAL | Age: 55
End: 2025-06-30
Attending: FAMILY MEDICINE
Payer: MEDICARE

## 2025-06-30 VITALS
BODY MASS INDEX: 24.87 KG/M2 | SYSTOLIC BLOOD PRESSURE: 102 MMHG | TEMPERATURE: 98 F | HEART RATE: 84 BPM | HEIGHT: 62 IN | DIASTOLIC BLOOD PRESSURE: 78 MMHG | WEIGHT: 135.13 LBS

## 2025-06-30 DIAGNOSIS — Z00.00 ANNUAL VISIT FOR GENERAL ADULT MEDICAL EXAMINATION WITHOUT ABNORMAL FINDINGS: Primary | ICD-10-CM

## 2025-06-30 DIAGNOSIS — Z79.899 ENCOUNTER FOR LONG-TERM CURRENT USE OF MEDICATION: ICD-10-CM

## 2025-06-30 DIAGNOSIS — Z13.220 LIPID SCREENING: ICD-10-CM

## 2025-06-30 DIAGNOSIS — Z11.3 SCREEN FOR STD (SEXUALLY TRANSMITTED DISEASE): ICD-10-CM

## 2025-06-30 DIAGNOSIS — F33.1 MAJOR DEPRESSIVE DISORDER, RECURRENT, MODERATE: Chronic | ICD-10-CM

## 2025-06-30 DIAGNOSIS — M32.9 SLE (SYSTEMIC LUPUS ERYTHEMATOSUS RELATED SYNDROME): Chronic | ICD-10-CM

## 2025-06-30 DIAGNOSIS — I10 ESSENTIAL HYPERTENSION: Chronic | ICD-10-CM

## 2025-06-30 DIAGNOSIS — I10 ESSENTIAL HYPERTENSION: ICD-10-CM

## 2025-06-30 PROBLEM — F13.20 SEDATIVE, HYPNOTIC OR ANXIOLYTIC DEPENDENCE, UNCOMPLICATED: Status: RESOLVED | Noted: 2025-06-30 | Resolved: 2025-06-30

## 2025-06-30 PROBLEM — F13.20 SEDATIVE, HYPNOTIC OR ANXIOLYTIC DEPENDENCE, UNCOMPLICATED: Status: ACTIVE | Noted: 2025-06-30

## 2025-06-30 LAB
CHOLEST SERPL-MCNC: 208 MG/DL (ref 120–199)
CHOLEST/HDLC SERPL: 4.2 {RATIO} (ref 2–5)
EAG (OHS): 91 MG/DL (ref 68–131)
HBA1C MFR BLD: 4.8 % (ref 4–5.6)
HBV SURFACE AG SERPL QL IA: NORMAL
HCV AB SERPL QL IA: NORMAL
HDLC SERPL-MCNC: 49 MG/DL (ref 40–75)
HDLC SERPL: 23.6 % (ref 20–50)
HIV 1+2 AB+HIV1 P24 AG SERPL QL IA: NORMAL
LDLC SERPL CALC-MCNC: 141.8 MG/DL (ref 63–159)
NONHDLC SERPL-MCNC: 159 MG/DL
T PALLIDUM IGG+IGM SER QL: NORMAL
TRIGL SERPL-MCNC: 86 MG/DL (ref 30–150)

## 2025-06-30 PROCEDURE — 3008F BODY MASS INDEX DOCD: CPT | Mod: CPTII,S$GLB,, | Performed by: FAMILY MEDICINE

## 2025-06-30 PROCEDURE — 1159F MED LIST DOCD IN RCRD: CPT | Mod: CPTII,S$GLB,, | Performed by: FAMILY MEDICINE

## 2025-06-30 PROCEDURE — 87389 HIV-1 AG W/HIV-1&-2 AB AG IA: CPT

## 2025-06-30 PROCEDURE — 87591 N.GONORRHOEAE DNA AMP PROB: CPT

## 2025-06-30 PROCEDURE — 87340 HEPATITIS B SURFACE AG IA: CPT

## 2025-06-30 PROCEDURE — 80061 LIPID PANEL: CPT

## 2025-06-30 PROCEDURE — 81515 NFCT DS BV&VAGINITIS DNA ALG: CPT

## 2025-06-30 PROCEDURE — 3078F DIAST BP <80 MM HG: CPT | Mod: CPTII,S$GLB,, | Performed by: FAMILY MEDICINE

## 2025-06-30 PROCEDURE — 82570 ASSAY OF URINE CREATININE: CPT

## 2025-06-30 PROCEDURE — 99999 PR PBB SHADOW E&M-EST. PATIENT-LVL V: CPT | Mod: PBBFAC,,, | Performed by: FAMILY MEDICINE

## 2025-06-30 PROCEDURE — 36415 COLL VENOUS BLD VENIPUNCTURE: CPT | Mod: PN

## 2025-06-30 PROCEDURE — 99396 PREV VISIT EST AGE 40-64: CPT | Mod: S$GLB,,, | Performed by: FAMILY MEDICINE

## 2025-06-30 PROCEDURE — 86803 HEPATITIS C AB TEST: CPT

## 2025-06-30 PROCEDURE — 83036 HEMOGLOBIN GLYCOSYLATED A1C: CPT

## 2025-06-30 PROCEDURE — 3074F SYST BP LT 130 MM HG: CPT | Mod: CPTII,S$GLB,, | Performed by: FAMILY MEDICINE

## 2025-06-30 PROCEDURE — 86593 SYPHILIS TEST NON-TREP QUANT: CPT

## 2025-06-30 RX ORDER — HYDROXYCHLOROQUINE SULFATE 200 MG/1
200 TABLET, FILM COATED ORAL
COMMUNITY

## 2025-06-30 RX ORDER — ANIFROLUMAB 300 MG/2ML
300 INJECTION, SOLUTION INTRAVENOUS
COMMUNITY

## 2025-06-30 NOTE — PROGRESS NOTES
Subjective:      Chief Complaint   Patient presents with    Other [facility] [place]     ANNUAL/ STD TESTING       Patient ID: Irasema Vang is a 54 y.o. female.  History of Present Illness    CHIEF COMPLAINT:    Well Adult Physical: Patient here for a comprehensive physical exam.The patient reports no problems      LUPUS:  She reports ongoing management of lupus with significant fatigue as her primary symptom. She receives lupus injections every four weeks which can be painful. She continues to have intermittent days of lupus-related challenges with current focus on managing fatigue. She describes experiencing intermittent hair loss related to lupus, which has been fluctuating and recently led her to shave her head due to uneven hair growth. She reports a history of recurrent nosebleeds occurring gradually over time and describes experiencing breast masses that develop and resolve periodically. She notes that these symptoms have become more comprehensible since her lupus diagnosis. She is currently being followed by Dr[facility]ber at [facility] with [facility].    ROS:  General: -fever, -chills, +fatigue, -weight gain, -weight loss  Eyes: -vision changes, -redness, -discharge  ENT: -ear pain, -nasal congestion, -sore throat, +nosebleeds  Cardiovascular: -chest pain, -palpitations, -lower extremity edema  Respiratory: -cough, -shortness of breath  Gastrointestinal: -abdominal pain, -nausea, -vomiting, -diarrhea, -constipation, -blood in stool  Genitourinary: -dysuria, -hematuria, -frequency  Musculoskeletal: -joint pain, -muscle pain  Skin: -rash, -lesion, +abnormal hair loss  Neurological: -headache, -dizziness, -numbness, -tingling  Psychiatric: -anxiety, -depression, -sleep difficulty  Breasts: +breast lumps       Irasema Vang's allergies, medications, history, and problem list were updated as appropriate.  Past Medical History:   Diagnosis Date    Abnormal Pap smear      "history of uterine cancer    Abnormal Pap smear of cervix     Asthma     no meds since age 20    Axillary lymphadenopathy; right 10/21/2024    Breast lump on left side at 6 o'clock position 11/06/2023    Breast lump on right side at 9 o'clock position 03/20/2024    Constipation, chronic     Depression     Family history of nephrolithiasis     General anesthetics causing adverse effect in therapeutic use     slow to wake up    History of cervical dysplasia 06/27/2013    Hypertension     LAD (lymphadenopathy) 11/07/2023    Menopausal syndrome (hot flashes) 06/27/2013    Nephrolithiasis 06/27/2013    PONV (postoperative nausea and vomiting)     PTSD (post-traumatic stress disorder)     Renal cell carcinoma     Renal cell carcinoma 08/24/2017    Seizures     1 yr ago- caused by migraine meds--no treatment now    Urinoma     Uterine cancer 2000     Family History   Problem Relation Name Age of Onset    Hypertension Mother      Cancer Father      Diabetes Brother      Breast cancer Maternal Grandmother      Breast cancer Paternal Aunt      Ovarian cancer Paternal Aunt      Breast cancer Paternal Aunt      Breast cancer Paternal Aunt      Colon cancer Neg Hx       Social History[1]  Encounter Medications[2]       Objective:      /78   Pulse 84   Temp 97.7 °F (36.5 °C)   Ht 5' 2" (1.575 m)   Wt 61.3 kg (135 lb 1.6 oz)   LMP 01/05/2001   BMI 24.71 kg/m²   Physical Exam  Vitals and nursing note reviewed.   Constitutional:       General: She is not in acute distress.  HENT:      Head: Normocephalic and atraumatic.   Cardiovascular:      Rate and Rhythm: Normal rate and regular rhythm.      Pulses: Normal pulses.      Heart sounds: No murmur heard.  Pulmonary:      Effort: Pulmonary effort is normal. No respiratory distress.      Breath sounds: Normal breath sounds. No wheezing or rhonchi.   Musculoskeletal:         General: No swelling or deformity.      Right lower leg: No edema.      Left lower leg: No edema. "   Neurological:      General: No focal deficit present.      Mental Status: She is alert.      Cranial Nerves: No cranial nerve deficit.   Psychiatric:         Behavior: Behavior normal.         Thought Content: Thought content normal.                       Results for orders placed or performed in visit on 01/05/25   POCT Urinalysis(Instrument)    Collection Time: 01/05/25 12:34 PM   Result Value Ref Range    Color, POC UA Yellow Yellow, Straw, Colorless    Clarity, POC UA Cloudy (A) Clear    Glucose, POC UA Negative Negative    Bilirubin, POC UA Negative Negative    Ketones, POC UA 15 (A) Negative    Spec Grav POC UA >=1.030 1.005 - 1.030    Blood, POC UA Large (A) Negative    pH, POC UA 6.0 5.0 - 8.0    Protein, POC  (A) Negative    Urobilinogen, POC UA 0.2 <=1.0    Nitrite, POC UA Positive (A) Negative    WBC, POC UA Large (A) Negative   Urine Culture High Risk    Collection Time: 01/05/25  1:05 PM    Specimen: Urine, Clean Catch   Result Value Ref Range    Urine Culture, Routine ESCHERICHIA COLI  >100,000 cfu/ml   (A)        Susceptibility    Escherichia coli - CULTURE, URINE     Amp/Sulbactam <=8/4 Sensitive mcg/mL     Ampicillin <=8 Sensitive mcg/mL     Ceftriaxone <=1 Sensitive mcg/mL     Cefazolin <=2 Sensitive mcg/mL     Ciprofloxacin >2 Resistant mcg/mL     Cefepime <=2 Sensitive mcg/mL     Ertapenem <=0.5 Sensitive mcg/mL     Nitrofurantoin <=32 Sensitive mcg/mL     Gentamicin <=2 Sensitive mcg/mL     Levofloxacin >4 Resistant mcg/mL     Meropenem <=1 Sensitive mcg/mL     Piperacillin/Tazo <=8 Sensitive mcg/mL     Trimeth/Sulfa <=2/38 Sensitive mcg/mL     Tobramycin <=2 Sensitive mcg/mL     Assessment/Plan:         1. Annual visit for general adult medical examination without abnormal findings    2. Essential hypertension    3. Encounter for long-term current use of medication    4. Screen for STD (sexually transmitted disease)    5. Lipid screening    6. Major depressive disorder, recurrent,  moderate    7. SLE (systemic lupus erythematosus related syndrome)      Annual visit for general adult medical examination without abnormal findings  Comments:  doing well    Essential hypertension  Comments:  controlled, no change in medication  Orders:  -     Hypertension Digital Medicine (HDMP) Enrollment Order  -     Microalbumin/Creatinine Ratio, Urine; Future; Expected date: 12/30/2025    Encounter for long-term current use of medication  -     Hemoglobin A1C; Future; Expected date: 06/30/2025    Screen for STD (sexually transmitted disease)  Comments:  requesting testing, denies sxs  Orders:  -     Treponema Pallidium Antibodies IgG, IgM; Future; Expected date: 06/30/2025  -     Hepatitis C Antibody; Future; Expected date: 06/30/2025  -     HIV 1/2 Ag/Ab (4th Gen); Future; Expected date: 06/30/2025  -     C. trachomatis/N. gonorrhoeae by AMP DNA; Future; Expected date: 06/30/2025  -     Hepatitis B Surface Antigen; Future; Expected date: 06/30/2025  -     Vaginosis Screen by DNA Probe; Future; Expected date: 12/30/2025    Lipid screening  -     Lipid Panel; Future; Expected date: 06/30/2025    Major depressive disorder, recurrent, moderate  Comments:  controlled    SLE (systemic lupus erythematosus related syndrome)  Comments:  sxs is controlled on saphnelo          Reviewed lupus diagnosis and current treatment plan, which includes IV injections every 4 weeks.  Noted complaint of recurring fatigue.  CBC and CMP results appear satisfactory.    PLAN SUMMARY:   Ordered CBC, CMP, and ECG   Continue current lupus medications   Discuss breast mass and nosebleeds with Dr. Lawanda Villalpando to continue receiving injections every 4 weeks at [facility]   Follow-up with Dr. Webber for ongoing lupus management     SYSTEMIC LUPUS ERYTHEMATOSUS, ORGAN OR SYSTEM INVOLVEMENT UNSPECIFIED:   Confirmed lupus diagnosis through tests done at [facility].   Irasema sees Rheumatology with [facility] for SLE.   Discussed that lupus  medications, while effective, Saphnelo can lower immunity.   Irasema reports feeling better currently but still experiences occasional fatigue.   Ordered CBC, CMP, and ECG for monitoring.             I have reviewed all of the patient's clinical history available in care everywhere and Epic and have utilized this in my evaluation and management recommendations today.      Treatment options and alternatives were discussed with the patient. Patient was given ample time to ask questions. All questions were answered. Voices understanding and acceptance of this advice. Will call back if any further questions or concerns.           This note was generated with the assistance of ambient listening technology. Verbal consent was obtained by the patient and accompanying visitor(s) for the recording of patient appointment to facilitate this note. I attest to having reviewed and edited the generated note for accuracy, though some syntax or spelling errors may persist. Please contact the author of this note for any clarification.            Kaya Pal MD  [facility],            [1]   Social History  Socioeconomic History    Marital status:    Tobacco Use    Smoking status: Never     Passive exposure: Past    Smokeless tobacco: Never   Substance and Sexual Activity    Alcohol use: Not Currently     Comment: occassionally     Drug use: Yes     Types: Marijuana     Comment: Liquid Cannabis Oil    Sexual activity: Not Currently     Partners: Male     Social Drivers of Health     Financial Resource Strain: Low Risk  (5/15/2025)    Overall Financial Resource Strain (CARDIA)     Difficulty of Paying Living Expenses: Not hard at all   Food Insecurity: No Food Insecurity (5/15/2025)    Hunger Vital Sign     Worried About Running Out of Food in the Last Year: Never true     Ran Out of Food in the Last Year: Never true   Transportation Needs: No Transportation Needs (5/15/2025)    PRAPARE -  Transportation     Lack of Transportation (Medical): No     Lack of Transportation (Non-Medical): No   Physical Activity: Insufficiently Active (5/15/2025)    Exercise Vital Sign     Days of Exercise per Week: 2 days     Minutes of Exercise per Session: 30 min   Stress: Stress Concern Present (5/15/2025)    [employer] [place] [employer] - Occupational Stress Questionnaire     Feeling of Stress : Very much   Housing Stability: Low Risk  (5/15/2025)    Housing Stability Vital Sign     Unable to Pay for Housing in the Last Year: No     Number of Times Moved in the Last Year: 0     Homeless in the Last Year: No   [2]   Outpatient Encounter Medications as of 6/30/2025   Medication Sig Dispense Refill    albuterol (PROVENTIL/VENTOLIN HFA) 90 mcg/actuation inhaler Inhale 2 puffs into the lungs every 4 (four) hours as needed.      anifrolumab-fnia (SAPHNELO) 300 mg/2 mL (150 mg/mL) Inject 300 mg into the vein.      buPROPion (WELLBUTRIN XL) 300 MG 24 hr tablet Take 300 mg by mouth.      EPINEPHrine (EPIPEN) 0.3 mg/0.3 mL AtIn Inject into the muscle as needed. 2 each 6    hydroxychloroquine (PLAQUENIL) 200 mg tablet 200 mg.      ibuprofen (ADVIL,MOTRIN) 800 MG tablet Take 1 tablet (800 mg total) by mouth every 6 (six) hours as needed for Pain. 30 tablet 1    linaclotide (LINZESS ORAL) Take by mouth.      losartan (COZAAR) 50 MG tablet Take 1 tablet (50 mg total) by mouth once daily. 90 tablet 3    mupirocin (BACTROBAN) 2 % ointment Apply topically 2 (two) times daily. 15 g 0    spironolactone (ALDACTONE) 50 MG tablet Take 1 tablet (50 mg total) by mouth once daily. 90 tablet 1    triamcinolone acetonide 0.1% (KENALOG) 0.1 % cream Apply topically 2 (two) times daily. for 14 days 80 g 2    zolpidem (AMBIEN) 5 MG Tab Take 1/2 to 1 tablet at bedtime as needed for sleep. 30 tablet 3    fluticasone propionate (FLONASE) 50 mcg/actuation nasal spray 2 sprays (100 mcg total) by Each Nostril route once daily. (Patient not  taking: Reported on 6/30/2025) 16 g 0     Facility-Administered Encounter Medications as of 6/30/2025   Medication Dose Route Frequency Provider Last Rate Last Admin    nozaseptin (NOZIN) nasal    Each Nostril On Call Procedure Darby Monroy MD   Given at 09/04/18 1019    phenazopyridine tablet 200 mg  200 mg Oral On Call Procedure Darby Monroy MD   200 mg at 09/04/18 1108

## 2025-07-01 LAB
ALBUMIN/CREAT UR: 5.7 UG/MG
CREAT UR-MCNC: 140 MG/DL (ref 15–325)
MICROALBUMIN UR-MCNC: 8 UG/ML (ref ?–5000)

## 2025-07-02 ENCOUNTER — RESULTS FOLLOW-UP (OUTPATIENT)
Dept: PRIMARY CARE CLINIC | Facility: CLINIC | Age: 55
End: 2025-07-02

## 2025-07-02 LAB
C TRACH DNA SPEC QL NAA+PROBE: NOT DETECTED
CTGC SOURCE (OHS) ORD-325: NORMAL
N GONORRHOEA DNA UR QL NAA+PROBE: NOT DETECTED

## 2025-07-04 LAB
BACTERIAL VAGINOSIS DNA (OHS): NOT DETECTED
CANDIDA GLABRATA/KRUSEI DNA (OHS): NOT DETECTED
CANDIDA SPECIES DNA (OHS): NOT DETECTED
TRICHOMONAS VAGINALIS DNA (OHS): NOT DETECTED

## 2025-08-21 ENCOUNTER — ON-DEMAND VIRTUAL (OUTPATIENT)
Dept: URGENT CARE | Facility: CLINIC | Age: 55
End: 2025-08-21
Payer: OTHER GOVERNMENT

## 2025-08-21 DIAGNOSIS — T78.40XA ALLERGIC REACTION, INITIAL ENCOUNTER: Primary | ICD-10-CM

## 2025-08-21 PROCEDURE — 98004 SYNCH AUDIO-VIDEO EST SF 10: CPT | Mod: 95,,, | Performed by: NURSE PRACTITIONER

## (undated) DEVICE — TUBING HEATED INSUFFLATOR

## (undated) DEVICE — APPLICATOR CHLORAPREP ORN 26ML

## (undated) DEVICE — SHEARS HARMONIC 5CM 36CM

## (undated) DEVICE — ELECTRODE REM PLYHSV RETURN 9

## (undated) DEVICE — SEE MEDLINE ITEM 152739

## (undated) DEVICE — SEE MEDLINE ITEM 146292

## (undated) DEVICE — ADHESIVE DERMABOND ADVANCED

## (undated) DEVICE — TROCAR ENDOPATH XCEL 11MM 10CM

## (undated) DEVICE — CORD LAP 10 DISP

## (undated) DEVICE — SEE MEDLINE ITEM 157181

## (undated) DEVICE — MANIFOLD 4 PORT

## (undated) DEVICE — SEE MEDLINE ITEM 157117

## (undated) DEVICE — CANNULA ENDOPATH XCEL 5X100MM

## (undated) DEVICE — KIT ANTIFOG

## (undated) DEVICE — GLOVE PROTEXIS HYDROGEL SZ6.5

## (undated) DEVICE — DISSECTOR EPIX LAPA 5MMX35CM

## (undated) DEVICE — SYR 3CC LUER LOC

## (undated) DEVICE — NDL PNEUMO INSUFFLATI 120MM

## (undated) DEVICE — BAG TISS RETRV MONARCH 10MM

## (undated) DEVICE — DRAPE LAVH LAPAROSCOPY W/FLUID

## (undated) DEVICE — COVER OVERHEAD SURG LT BLUE

## (undated) DEVICE — SEE MEDLINE ITEM 154981

## (undated) DEVICE — SOL NS 1000CC

## (undated) DEVICE — TRAY CATH FOLEY 16FR STAT LOC

## (undated) DEVICE — SEE MEDLINE ITEM 146420

## (undated) DEVICE — TROCAR ENDOPATH XCEL 5X100MM

## (undated) DEVICE — IRRIGATOR ENDOSCOPY DISP.

## (undated) DEVICE — GLOVE SURGICAL LATEX SZ 6

## (undated) DEVICE — SUT PDSII DA VIOL1X18 V-38

## (undated) DEVICE — SOL WATER STRL IRR 1000ML

## (undated) DEVICE — MANIPULATOR UTERINE

## (undated) DEVICE — SUT VICRYL 4-0 ANTIBACT

## (undated) DEVICE — SEE MEDLINE ITEM 157027

## (undated) DEVICE — SYR 10CC LUER LOCK

## (undated) DEVICE — GLOVE SURGICAL LATEX SZ 7

## (undated) DEVICE — SEAL SCOPE WARMER 20/BX

## (undated) DEVICE — Device